# Patient Record
Sex: FEMALE | Race: WHITE | URBAN - METROPOLITAN AREA
[De-identification: names, ages, dates, MRNs, and addresses within clinical notes are randomized per-mention and may not be internally consistent; named-entity substitution may affect disease eponyms.]

---

## 2022-12-15 ENCOUNTER — APPOINTMENT (RX ONLY)
Dept: URBAN - METROPOLITAN AREA CLINIC 84 | Facility: CLINIC | Age: 67
Setting detail: DERMATOLOGY
End: 2022-12-15

## 2022-12-15 DIAGNOSIS — L40.0 PSORIASIS VULGARIS: ICD-10-CM

## 2022-12-15 PROCEDURE — ? PRESCRIPTION MEDICATION MANAGEMENT

## 2022-12-15 PROCEDURE — ? PRESCRIPTION

## 2022-12-15 PROCEDURE — 99214 OFFICE O/P EST MOD 30 MIN: CPT

## 2022-12-15 PROCEDURE — ? COUNSELING

## 2022-12-15 RX ORDER — CLOBETASOL PROPIONATE 0.5 MG/G
OINTMENT TOPICAL
Qty: 60 | Refills: 5 | Status: ERX | COMMUNITY
Start: 2022-12-15

## 2022-12-15 RX ADMIN — CLOBETASOL PROPIONATE: 0.5 OINTMENT TOPICAL at 00:00

## 2022-12-15 ASSESSMENT — LOCATION ZONE DERM
LOCATION ZONE: LEG
LOCATION ZONE: HAND
LOCATION ZONE: ARM

## 2022-12-15 ASSESSMENT — LOCATION SIMPLE DESCRIPTION DERM
LOCATION SIMPLE: LEFT POSTERIOR UPPER ARM
LOCATION SIMPLE: RIGHT FOREARM
LOCATION SIMPLE: LEFT HAND
LOCATION SIMPLE: LEFT KNEE
LOCATION SIMPLE: RIGHT HAND
LOCATION SIMPLE: RIGHT KNEE

## 2022-12-15 ASSESSMENT — LOCATION DETAILED DESCRIPTION DERM
LOCATION DETAILED: LEFT ULNAR DORSAL HAND
LOCATION DETAILED: RIGHT ULNAR DORSAL HAND
LOCATION DETAILED: LEFT DISTAL POSTERIOR UPPER ARM
LOCATION DETAILED: RIGHT PROXIMAL DORSAL FOREARM
LOCATION DETAILED: RIGHT KNEE
LOCATION DETAILED: LEFT KNEE

## 2022-12-15 NOTE — PROCEDURE: PRESCRIPTION MEDICATION MANAGEMENT
Initiate Treatment: Tremfya injection, new insurance cards obtained today. Sending in updated info to [facility].
Detail Level: Zone
Render In Strict Bullet Format?: No

## 2023-10-11 ENCOUNTER — PREP FOR PROCEDURE (OUTPATIENT)
Dept: SURGERY | Facility: HOSPITAL | Age: 68
End: 2023-10-11

## 2023-10-11 ENCOUNTER — APPOINTMENT (OUTPATIENT)
Dept: SURGERY | Facility: CLINIC | Age: 68
End: 2023-10-11

## 2023-10-11 ENCOUNTER — LAB (OUTPATIENT)
Dept: LAB | Facility: LAB | Age: 68
End: 2023-10-11
Payer: COMMERCIAL

## 2023-10-11 ENCOUNTER — OFFICE VISIT (OUTPATIENT)
Dept: SURGERY | Facility: CLINIC | Age: 68
End: 2023-10-11
Payer: COMMERCIAL

## 2023-10-11 VITALS — SYSTOLIC BLOOD PRESSURE: 124 MMHG | DIASTOLIC BLOOD PRESSURE: 80 MMHG | WEIGHT: 193 LBS

## 2023-10-11 DIAGNOSIS — C18.7 CANCER OF SIGMOID COLON (MULTI): Primary | ICD-10-CM

## 2023-10-11 DIAGNOSIS — C18.7 CANCER OF SIGMOID COLON (MULTI): ICD-10-CM

## 2023-10-11 PROCEDURE — 82378 CARCINOEMBRYONIC ANTIGEN: CPT | Performed by: SURGERY

## 2023-10-11 PROCEDURE — 99203 OFFICE O/P NEW LOW 30 MIN: CPT | Performed by: SURGERY

## 2023-10-11 PROCEDURE — 1159F MED LIST DOCD IN RCRD: CPT | Performed by: SURGERY

## 2023-10-11 RX ORDER — CLOPIDOGREL BISULFATE 75 MG/1
75 TABLET ORAL DAILY
COMMUNITY
End: 2024-02-13 | Stop reason: ALTCHOICE

## 2023-10-11 RX ORDER — METRONIDAZOLE 500 MG/100ML
500 INJECTION, SOLUTION INTRAVENOUS ONCE
Status: CANCELLED | OUTPATIENT
Start: 2023-10-11 | End: 2023-10-11

## 2023-10-11 RX ORDER — METRONIDAZOLE 500 MG/1
500 TABLET ORAL SEE ADMIN INSTRUCTIONS
Qty: 3 TABLET | Refills: 0 | Status: SHIPPED | OUTPATIENT
Start: 2023-10-11 | End: 2023-10-28 | Stop reason: HOSPADM

## 2023-10-11 RX ORDER — BUPROPION HYDROCHLORIDE 150 MG/1
150 TABLET ORAL DAILY
COMMUNITY
End: 2024-02-13 | Stop reason: WASHOUT

## 2023-10-11 RX ORDER — ACETAMINOPHEN 500 MG
1000 TABLET ORAL ONCE
Status: CANCELLED | OUTPATIENT
Start: 2023-10-11 | End: 2023-10-11

## 2023-10-11 RX ORDER — CLOBETASOL PROPIONATE 0.5 MG/G
1 CREAM TOPICAL 2 TIMES DAILY PRN
COMMUNITY
End: 2024-05-08 | Stop reason: ENTERED-IN-ERROR

## 2023-10-11 RX ORDER — METOPROLOL SUCCINATE 25 MG/1
25 TABLET, EXTENDED RELEASE ORAL DAILY
COMMUNITY
End: 2023-11-22 | Stop reason: SDUPTHER

## 2023-10-11 RX ORDER — SODIUM CHLORIDE 9 MG/ML
10 INJECTION, SOLUTION INTRAVENOUS CONTINUOUS
Status: CANCELLED | OUTPATIENT
Start: 2023-10-11

## 2023-10-11 RX ORDER — LISINOPRIL 10 MG/1
10 TABLET ORAL 2 TIMES DAILY
COMMUNITY
End: 2023-11-22 | Stop reason: SDUPTHER

## 2023-10-11 RX ORDER — ROSUVASTATIN CALCIUM 5 MG/1
5 TABLET, COATED ORAL DAILY
COMMUNITY
End: 2023-11-22 | Stop reason: SDUPTHER

## 2023-10-11 RX ORDER — ALVIMOPAN 12 MG/1
12 CAPSULE ORAL ONCE
Status: CANCELLED | OUTPATIENT
Start: 2023-10-11 | End: 2023-10-11

## 2023-10-11 RX ORDER — HEPARIN SODIUM 5000 [USP'U]/ML
5000 INJECTION, SOLUTION INTRAVENOUS; SUBCUTANEOUS ONCE
Status: CANCELLED | OUTPATIENT
Start: 2023-10-11 | End: 2023-10-11

## 2023-10-11 RX ORDER — NEOMYCIN SULFATE 500 MG/1
TABLET ORAL
Qty: 6 TABLET | Refills: 0 | Status: SHIPPED | OUTPATIENT
Start: 2023-10-11 | End: 2023-11-10

## 2023-10-11 ASSESSMENT — ENCOUNTER SYMPTOMS
ABDOMINAL PAIN: 1
CONSTIPATION: 1

## 2023-10-11 NOTE — H&P (VIEW-ONLY)
Subjective   Patient ID: Janis Ibanez is a 68 y.o. female who presents for Mass.  HPI  68-year-old female smoker history of anal cancer status post chemo and radiation 20 years ago self-referred for near obstructing sigmoid mass biopsy proven for adenocarcinoma in [place].  Patient has had a CT but images not imported she is having partial obstructive symptoms moderate abdominal discomfort but no severe pain  Review of Systems   Gastrointestinal:  Positive for abdominal pain and constipation.   All other systems reviewed and are negative.      Objective   Physical Exam  Abdominal:      Palpations: Abdomen is soft.      Comments: Mild abdominal distention no palpable mass       Physical Exam  Constitutional:       Appearance: Normal appearance.   HENT:      Head: Normocephalic and atraumatic.      Mouth/Throat:      Pharynx: Oropharynx is clear.   Eyes:      Pupils: Pupils are equal, round, and reactive to light.   Cardiovascular:      Rate and Rhythm: Normal rate and regular rhythm.   Pulmonary:      Effort: Pulmonary effort is normal.      Breath sounds: Normal breath sounds.   Abdominal:      General: Abdomen is flat. Bowel sounds are normal.      Palpations: Abdomen is soft.   Musculoskeletal:         General: Normal range of motion.      Cervical back: Normal range of motion.   Skin:     General: Skin is warm.   Neurological:      General: No focal deficit present.      Mental Status: She is alert. Mental status is at baseline.   Psychiatric:         Mood and Affect: Mood normal.    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 D. Amico for ureteral stents, needs cardiac clearance before surgery needs to stop Plavix 7 days before

## 2023-10-12 LAB — CEA SERPL-MCNC: 20.7 UG/L

## 2023-10-14 RX ORDER — HYDROXYZINE HYDROCHLORIDE 25 MG/1
25 TABLET, FILM COATED ORAL NIGHTLY PRN
COMMUNITY
Start: 2023-09-17 | End: 2024-02-13 | Stop reason: WASHOUT

## 2023-10-17 ENCOUNTER — OFFICE VISIT (OUTPATIENT)
Dept: CARDIOLOGY | Facility: CLINIC | Age: 68
End: 2023-10-17
Payer: COMMERCIAL

## 2023-10-17 VITALS
DIASTOLIC BLOOD PRESSURE: 72 MMHG | SYSTOLIC BLOOD PRESSURE: 126 MMHG | WEIGHT: 193.7 LBS | BODY MASS INDEX: 32.27 KG/M2 | HEART RATE: 51 BPM | HEIGHT: 65 IN

## 2023-10-17 DIAGNOSIS — F17.200 CURRENT EVERY DAY SMOKER: ICD-10-CM

## 2023-10-17 DIAGNOSIS — I25.2 H/O ACUTE MYOCARDIAL INFARCTION: ICD-10-CM

## 2023-10-17 DIAGNOSIS — I25.10 CAD S/P PERCUTANEOUS CORONARY ANGIOPLASTY: ICD-10-CM

## 2023-10-17 DIAGNOSIS — E78.2 MIXED HYPERLIPIDEMIA: ICD-10-CM

## 2023-10-17 DIAGNOSIS — R94.31 ABNORMAL EKG: ICD-10-CM

## 2023-10-17 DIAGNOSIS — C18.7 CANCER OF SIGMOID COLON (MULTI): ICD-10-CM

## 2023-10-17 DIAGNOSIS — Z01.810 PREOP CARDIOVASCULAR EXAM: ICD-10-CM

## 2023-10-17 DIAGNOSIS — Z98.61 CAD S/P PERCUTANEOUS CORONARY ANGIOPLASTY: ICD-10-CM

## 2023-10-17 PROCEDURE — 99204 OFFICE O/P NEW MOD 45 MIN: CPT | Performed by: INTERNAL MEDICINE

## 2023-10-17 PROCEDURE — 93000 ELECTROCARDIOGRAM COMPLETE: CPT | Performed by: INTERNAL MEDICINE

## 2023-10-17 PROCEDURE — 1159F MED LIST DOCD IN RCRD: CPT | Performed by: INTERNAL MEDICINE

## 2023-10-17 PROCEDURE — 3008F BODY MASS INDEX DOCD: CPT | Performed by: INTERNAL MEDICINE

## 2023-10-17 NOTE — PROGRESS NOTES
Referred by Dr. Olivares, Pedro GUTIERREZ MD provider found for   Chief Complaint   Patient presents with    New Patient Visit     She is here for POS for colon resection with Dr. Olivares        History of Present Illness  Janis Ibanez is a 68 y.o. year old female patient is here for preop clearance for right colon resection for colon cancer.  She is a pleasant female who had remote history of myocardial infarction remote history of coronary stenting about 10 years ago in [place].  Apparently she had myocardial infarction but she is not sure how much damage she had.  She has been cared for all the time in [place].  Recently was diagnosed with colon cancer in the cecum appeared for the surgery and further management.  The patient stated that she had not had any testing done for the heart patient's been chronically on Plavix therapy.  She is smoker of hypertension no history of diabetes.  Patient stated that she has a good functional capacity she has been walking but she does have some degenerative joint disease.  I discussed with the patient great length that we need to evaluate her cardiac status.  Will obtain echo to see whether she had a previous low ejection fraction prior to surgery.  Also obtain Lexiscan stress to the patient is not able to run on treadmill.  This will be rule out ischemia specifically in view of previous history of myocardial infarction and previous history of coronary stenting.  If this is negative then she will be cleared for surgery she can stop Plavix 7 days prior to operation.  The fact that she had stent a long time ago she may not need Plavix in the long-term.  Strongly advised to stop smoking.  Based on the results of the testing further recommendation will be made    Past Medical History  Past Medical History:   Diagnosis Date    History of heart attack        Social History  Social History     Tobacco Use    Smoking status: Every Day     Packs/day: .25     Types: Cigarettes    Smokeless  tobacco: Never   Substance Use Topics    Alcohol use: Not Currently     Comment: rare    Drug use: Never       Family History     Family History   Problem Relation Name Age of Onset    Hypertension Mother      Heart disease Mother      Other (cardiac stents) Mother      Hypertension Father      Heart attack Father      Prostate cancer Father         Review of Systems  As per HPI, all other systems reviewed and negative.    Allergies:  No Known Allergies     Outpatient Medications:  Current Outpatient Medications   Medication Instructions    buPROPion XL (WELLBUTRIN XL) 150 mg, oral, Daily, Do not crush, chew, or split.    clobetasol (Temovate) 0.05 % cream Topical, 2 times daily    clopidogrel (PLAVIX) 75 mg, oral, Daily    hydrOXYzine HCL (ATARAX) 25 mg, oral, Nightly    lisinopril 10 mg, oral, Daily    metoprolol succinate XL (TOPROL-XL) 25 mg, oral, Daily, Do not crush or chew.    metroNIDAZOLE (FLAGYL) 500 mg, oral, See admin instructions, Take 1 (one) tablet (500mg) at 6 PM, 7 PM, and 11 PM the night before surgery.    neomycin (Mycifradin) 500 mg tablet Take two tablets (1000 mg) by mouth at 3:00pm, 4:00pm and at bed time on the day prior to surgery    rosuvastatin (CRESTOR) 5 mg, oral, Daily         Vitals:  Vitals:    10/17/23 0827   BP: 126/72   Pulse: 51       Physical Exam:  Physical Exam  Vitals and nursing note reviewed.   Constitutional:       Appearance: Normal appearance. She is normal weight.   HENT:      Head: Normocephalic and atraumatic.   Eyes:      Extraocular Movements: Extraocular movements intact.      Pupils: Pupils are equal, round, and reactive to light.   Cardiovascular:      Rate and Rhythm: Normal rate and regular rhythm.      Pulses: Normal pulses.   Pulmonary:      Effort: Pulmonary effort is normal.      Breath sounds: Normal breath sounds.   Musculoskeletal:      Cervical back: Normal range of motion.      Right lower leg: No edema.      Left lower leg: No edema.   Skin:      General: Skin is warm and dry.   Neurological:      General: No focal deficit present.      Mental Status: She is alert and oriented to person, place, and time.             Assessment/Plan   Diagnoses and all orders for this visit:  Cancer of sigmoid colon (CMS/HCC)  -     Referral to Cardiology  Preop cardiovascular exam  CAD S/P percutaneous coronary angioplasty  H/O acute myocardial infarction  Mixed hyperlipidemia  BMI 32.0-32.9,adult  Current every day smoker      Kolton Lam MD [facility]  Interventional Cardiology   of [facility]     Thank you for allowing me to participate in the care of this patient. Please do not hesitate to contact me with any further questions or concerns.

## 2023-10-17 NOTE — PATIENT INSTRUCTIONS
Patient to follow up in 3 months with Dr. Genaro MD     Plan to complete Nuclear Lexiscan Stress Test and Echo done PREOP.   Office will hold clearance form until results reviewed and acceptable. Will call you with results and clearance.     Encouraged to quit smoking- heart healthy education given today.     No other changes today.   Continue same medications    Rajan BARRETT RN am scribing for and in the presence of Dr. Genaro MD

## 2023-10-19 ENCOUNTER — TELEPHONE (OUTPATIENT)
Dept: CARDIOLOGY | Facility: CLINIC | Age: 68
End: 2023-10-19
Payer: COMMERCIAL

## 2023-10-19 NOTE — TELEPHONE ENCOUNTER
Patient seen recently with Dr. Genaro MD for preop clearance pending Laparoscopic Sigmoid Resection possible open with Dr. Jolanta MD on 10/27/23.   Preop Nuclear and Echo ordered.   Scheduled tentatively for 10/20 and 10/24.     Form held, and placed in this writers purple clearance folder until results reviewed and clearance granted.   Postponing this note until 10/24 again.

## 2023-10-20 ENCOUNTER — HOSPITAL ENCOUNTER (OUTPATIENT)
Dept: CARDIOLOGY | Facility: CLINIC | Age: 68
Discharge: HOME | End: 2023-10-20
Payer: COMMERCIAL

## 2023-10-20 ENCOUNTER — ANCILLARY PROCEDURE (OUTPATIENT)
Dept: RADIOLOGY | Facility: CLINIC | Age: 68
End: 2023-10-20
Payer: COMMERCIAL

## 2023-10-20 DIAGNOSIS — I25.10 CAD S/P PERCUTANEOUS CORONARY ANGIOPLASTY: ICD-10-CM

## 2023-10-20 DIAGNOSIS — Z01.810 PREOP CARDIOVASCULAR EXAM: Primary | ICD-10-CM

## 2023-10-20 DIAGNOSIS — R94.31 ABNORMAL EKG: ICD-10-CM

## 2023-10-20 DIAGNOSIS — Z01.810 PREOP CARDIOVASCULAR EXAM: ICD-10-CM

## 2023-10-20 DIAGNOSIS — Z98.61 CAD S/P PERCUTANEOUS CORONARY ANGIOPLASTY: ICD-10-CM

## 2023-10-20 PROCEDURE — 93017 CV STRESS TEST TRACING ONLY: CPT

## 2023-10-20 PROCEDURE — 3430000001 HC RX 343 DIAGNOSTIC RADIOPHARMACEUTICALS: Performed by: INTERNAL MEDICINE

## 2023-10-20 PROCEDURE — 78452 HT MUSCLE IMAGE SPECT MULT: CPT | Performed by: INTERNAL MEDICINE

## 2023-10-20 PROCEDURE — 93016 CV STRESS TEST SUPVJ ONLY: CPT | Performed by: INTERNAL MEDICINE

## 2023-10-20 PROCEDURE — 93018 CV STRESS TEST I&R ONLY: CPT | Performed by: INTERNAL MEDICINE

## 2023-10-20 PROCEDURE — 78452 HT MUSCLE IMAGE SPECT MULT: CPT

## 2023-10-20 PROCEDURE — A9502 TC99M TETROFOSMIN: HCPCS | Performed by: INTERNAL MEDICINE

## 2023-10-20 PROCEDURE — 2500000004 HC RX 250 GENERAL PHARMACY W/ HCPCS (ALT 636 FOR OP/ED): Performed by: INTERNAL MEDICINE

## 2023-10-20 RX ORDER — REGADENOSON 0.08 MG/ML
0.4 INJECTION, SOLUTION INTRAVENOUS ONCE
Status: COMPLETED | OUTPATIENT
Start: 2023-10-20 | End: 2023-10-20

## 2023-10-20 RX ADMIN — REGADENOSON 0.4 MG: 0.08 INJECTION, SOLUTION INTRAVENOUS at 09:17

## 2023-10-20 RX ADMIN — TETROFOSMIN 11.8 MILLICURIE: 0.23 INJECTION, POWDER, LYOPHILIZED, FOR SOLUTION INTRAVENOUS at 07:56

## 2023-10-20 RX ADMIN — TETROFOSMIN 35.7 MILLICURIE: 0.23 INJECTION, POWDER, LYOPHILIZED, FOR SOLUTION INTRAVENOUS at 09:17

## 2023-10-24 ENCOUNTER — TELEPHONE (OUTPATIENT)
Dept: CARDIOLOGY | Facility: CLINIC | Age: 68
End: 2023-10-24
Payer: COMMERCIAL

## 2023-10-24 ENCOUNTER — HOSPITAL ENCOUNTER (OUTPATIENT)
Dept: CARDIOLOGY | Facility: HOSPITAL | Age: 68
Discharge: HOME | DRG: 989 | End: 2023-10-24
Payer: COMMERCIAL

## 2023-10-24 DIAGNOSIS — Z98.61 CAD S/P PERCUTANEOUS CORONARY ANGIOPLASTY: ICD-10-CM

## 2023-10-24 DIAGNOSIS — I25.10 CAD S/P PERCUTANEOUS CORONARY ANGIOPLASTY: ICD-10-CM

## 2023-10-24 DIAGNOSIS — Z01.810 PREOP CARDIOVASCULAR EXAM: ICD-10-CM

## 2023-10-24 DIAGNOSIS — R94.31 ABNORMAL EKG: ICD-10-CM

## 2023-10-24 LAB — EJECTION FRACTION APICAL 4 CHAMBER: 57.1

## 2023-10-24 PROCEDURE — 93306 TTE W/DOPPLER COMPLETE: CPT | Performed by: INTERNAL MEDICINE

## 2023-10-24 PROCEDURE — 93306 TTE W/DOPPLER COMPLETE: CPT

## 2023-10-24 NOTE — TELEPHONE ENCOUNTER
Noted below- will wait for Echo to be completed today and resulted. Will address clearance at that time.

## 2023-10-24 NOTE — TELEPHONE ENCOUNTER
Echo resulted today:  CONCLUSIONS:   1. Left ventricular systolic function is normal with a 55-60% estimated ejection fraction.   2. Spectral Doppler shows an impaired relaxation pattern of left ventricular diastolic filling.   3. Aortic valve sclerosis.   4. Mild aortic valve regurgitation.    Placed form for Dr. Genaro MD review today in office. Asking to hold Plavix x7 days preop. Both Echo and Nuclear are normal.

## 2023-10-24 NOTE — TELEPHONE ENCOUNTER
Katerin called office stating that pt is scheduled for Lap. Sigmoid Colon Resection on Friday 10/27/2023.  They need to know if pt can hold plavix.    Katerin states that they faxed clearance form to office on 10/11/2023.      Please review with Dr. Lam while in office and follow up with Katerin PORTER.

## 2023-10-24 NOTE — TELEPHONE ENCOUNTER
There is already a note started regarding this clearance. Patient was scheduled for preop Echo and Nuclear. Of which is being completed today. I had postponed my previous note to follow up on results and address clearance today. Will close this note and follow up in previous one. Thank you.

## 2023-10-25 NOTE — TELEPHONE ENCOUNTER
Per Dr. Genaro MD- patient Echo and Nuclear both acceptable. Patient cleared for procedure. OK to hold Plavix x5 days preop. Form completed and faxed with confirmation received.   Patient advised with verbal understanding, she has been holding Plavix currently.   Placed to scan.

## 2023-10-26 ENCOUNTER — PREP FOR PROCEDURE (OUTPATIENT)
Dept: SURGERY | Facility: HOSPITAL | Age: 68
End: 2023-10-26
Payer: COMMERCIAL

## 2023-10-26 DIAGNOSIS — Z12.11 COLON CANCER SCREENING: Primary | ICD-10-CM

## 2023-10-27 ENCOUNTER — HOSPITAL ENCOUNTER (INPATIENT)
Facility: HOSPITAL | Age: 68
LOS: 1 days | Discharge: HOME | DRG: 989 | End: 2023-10-28
Attending: SURGERY | Admitting: SURGERY
Payer: COMMERCIAL

## 2023-10-27 ENCOUNTER — ANESTHESIA (OUTPATIENT)
Dept: OPERATING ROOM | Facility: HOSPITAL | Age: 68
DRG: 989 | End: 2023-10-27
Payer: COMMERCIAL

## 2023-10-27 ENCOUNTER — ANESTHESIA EVENT (OUTPATIENT)
Dept: OPERATING ROOM | Facility: HOSPITAL | Age: 68
DRG: 989 | End: 2023-10-27
Payer: COMMERCIAL

## 2023-10-27 DIAGNOSIS — C18.7 CANCER OF SIGMOID COLON (MULTI): Primary | ICD-10-CM

## 2023-10-27 PROBLEM — I21.9 MI (MYOCARDIAL INFARCTION) (MULTI): Status: ACTIVE | Noted: 2023-10-27

## 2023-10-27 PROCEDURE — 2720000007 HC OR 272 NO HCPCS: Performed by: SURGERY

## 2023-10-27 PROCEDURE — 0T9B70Z DRAINAGE OF BLADDER WITH DRAINAGE DEVICE, VIA NATURAL OR ARTIFICIAL OPENING: ICD-10-PCS | Performed by: SURGERY

## 2023-10-27 PROCEDURE — 96372 THER/PROPH/DIAG INJ SC/IM: CPT | Performed by: SURGERY

## 2023-10-27 PROCEDURE — 0T788DZ DILATION OF BILATERAL URETERS WITH INTRALUMINAL DEVICE, VIA NATURAL OR ARTIFICIAL OPENING ENDOSCOPIC: ICD-10-PCS | Performed by: SURGERY

## 2023-10-27 PROCEDURE — 2500000004 HC RX 250 GENERAL PHARMACY W/ HCPCS (ALT 636 FOR OP/ED): Performed by: ANESTHESIOLOGY

## 2023-10-27 PROCEDURE — C1769 GUIDE WIRE: HCPCS | Performed by: SURGERY

## 2023-10-27 PROCEDURE — 2500000005 HC RX 250 GENERAL PHARMACY W/O HCPCS: Performed by: ANESTHESIOLOGY

## 2023-10-27 PROCEDURE — 7100000001 HC RECOVERY ROOM TIME - INITIAL BASE CHARGE: Performed by: SURGERY

## 2023-10-27 PROCEDURE — 1100000001 HC PRIVATE ROOM DAILY

## 2023-10-27 PROCEDURE — A4217 STERILE WATER/SALINE, 500 ML: HCPCS | Performed by: SURGERY

## 2023-10-27 PROCEDURE — 3600000009 HC OR TIME - EACH INCREMENTAL 1 MINUTE - PROCEDURE LEVEL FOUR: Performed by: SURGERY

## 2023-10-27 PROCEDURE — 3700000002 HC GENERAL ANESTHESIA TIME - EACH INCREMENTAL 1 MINUTE: Performed by: SURGERY

## 2023-10-27 PROCEDURE — 2500000004 HC RX 250 GENERAL PHARMACY W/ HCPCS (ALT 636 FOR OP/ED): Performed by: SURGERY

## 2023-10-27 PROCEDURE — 7100000002 HC RECOVERY ROOM TIME - EACH INCREMENTAL 1 MINUTE: Performed by: SURGERY

## 2023-10-27 PROCEDURE — 3700000001 HC GENERAL ANESTHESIA TIME - INITIAL BASE CHARGE: Performed by: SURGERY

## 2023-10-27 PROCEDURE — 2500000001 HC RX 250 WO HCPCS SELF ADMINISTERED DRUGS (ALT 637 FOR MEDICARE OP): Performed by: SURGERY

## 2023-10-27 PROCEDURE — 2500000005 HC RX 250 GENERAL PHARMACY W/O HCPCS: Performed by: SURGERY

## 2023-10-27 PROCEDURE — 3600000004 HC OR TIME - INITIAL BASE CHARGE - PROCEDURE LEVEL FOUR: Performed by: SURGERY

## 2023-10-27 PROCEDURE — 58660 LAPAROSCOPY LYSIS: CPT | Performed by: SURGERY

## 2023-10-27 RX ORDER — MORPHINE SULFATE 4 MG/ML
4 INJECTION, SOLUTION INTRAMUSCULAR; INTRAVENOUS EVERY 4 HOURS PRN
Status: DISCONTINUED | OUTPATIENT
Start: 2023-10-27 | End: 2023-10-28 | Stop reason: HOSPADM

## 2023-10-27 RX ORDER — METRONIDAZOLE 500 MG/100ML
500 INJECTION, SOLUTION INTRAVENOUS ONCE
Status: COMPLETED | OUTPATIENT
Start: 2023-10-27 | End: 2023-10-27

## 2023-10-27 RX ORDER — PROPOFOL 10 MG/ML
INJECTION, EMULSION INTRAVENOUS AS NEEDED
Status: DISCONTINUED | OUTPATIENT
Start: 2023-10-27 | End: 2023-10-27

## 2023-10-27 RX ORDER — MIDAZOLAM HYDROCHLORIDE 1 MG/ML
1 INJECTION, SOLUTION INTRAMUSCULAR; INTRAVENOUS ONCE AS NEEDED
Status: DISCONTINUED | OUTPATIENT
Start: 2023-10-27 | End: 2023-10-27 | Stop reason: HOSPADM

## 2023-10-27 RX ORDER — FENTANYL CITRATE 50 UG/ML
INJECTION, SOLUTION INTRAMUSCULAR; INTRAVENOUS AS NEEDED
Status: DISCONTINUED | OUTPATIENT
Start: 2023-10-27 | End: 2023-10-27

## 2023-10-27 RX ORDER — DROPERIDOL 2.5 MG/ML
0.62 INJECTION, SOLUTION INTRAMUSCULAR; INTRAVENOUS ONCE AS NEEDED
Status: CANCELLED | OUTPATIENT
Start: 2023-10-27

## 2023-10-27 RX ORDER — GLYCOPYRROLATE 0.2 MG/ML
INJECTION INTRAMUSCULAR; INTRAVENOUS AS NEEDED
Status: DISCONTINUED | OUTPATIENT
Start: 2023-10-27 | End: 2023-10-27

## 2023-10-27 RX ORDER — SODIUM CHLORIDE 9 MG/ML
10 INJECTION, SOLUTION INTRAVENOUS CONTINUOUS
Status: DISCONTINUED | OUTPATIENT
Start: 2023-10-27 | End: 2023-10-27

## 2023-10-27 RX ORDER — OXYCODONE HYDROCHLORIDE 5 MG/1
5 TABLET ORAL EVERY 4 HOURS PRN
Status: DISCONTINUED | OUTPATIENT
Start: 2023-10-27 | End: 2023-10-28 | Stop reason: HOSPADM

## 2023-10-27 RX ORDER — SODIUM CHLORIDE 9 MG/ML
100 INJECTION, SOLUTION INTRAVENOUS CONTINUOUS
Status: DISCONTINUED | OUTPATIENT
Start: 2023-10-27 | End: 2023-10-28 | Stop reason: HOSPADM

## 2023-10-27 RX ORDER — SODIUM CHLORIDE 0.9 G/100ML
IRRIGANT IRRIGATION AS NEEDED
Status: DISCONTINUED | OUTPATIENT
Start: 2023-10-27 | End: 2023-10-27 | Stop reason: HOSPADM

## 2023-10-27 RX ORDER — KETOROLAC TROMETHAMINE 30 MG/ML
15 INJECTION, SOLUTION INTRAMUSCULAR; INTRAVENOUS EVERY 6 HOURS PRN
Status: DISCONTINUED | OUTPATIENT
Start: 2023-10-27 | End: 2023-10-28 | Stop reason: HOSPADM

## 2023-10-27 RX ORDER — CEFAZOLIN SODIUM 2 G/100ML
2 INJECTION, SOLUTION INTRAVENOUS ONCE
Status: COMPLETED | OUTPATIENT
Start: 2023-10-27 | End: 2023-10-27

## 2023-10-27 RX ORDER — DROPERIDOL 2.5 MG/ML
0.62 INJECTION, SOLUTION INTRAMUSCULAR; INTRAVENOUS ONCE AS NEEDED
Status: DISCONTINUED | OUTPATIENT
Start: 2023-10-27 | End: 2023-10-27 | Stop reason: HOSPADM

## 2023-10-27 RX ORDER — MIDAZOLAM HYDROCHLORIDE 1 MG/ML
INJECTION, SOLUTION INTRAMUSCULAR; INTRAVENOUS AS NEEDED
Status: DISCONTINUED | OUTPATIENT
Start: 2023-10-27 | End: 2023-10-27

## 2023-10-27 RX ORDER — POLYETHYLENE GLYCOL 3350 17 G/17G
17 POWDER, FOR SOLUTION ORAL DAILY
Status: DISCONTINUED | OUTPATIENT
Start: 2023-10-27 | End: 2023-10-28 | Stop reason: HOSPADM

## 2023-10-27 RX ORDER — LABETALOL HYDROCHLORIDE 5 MG/ML
5 INJECTION, SOLUTION INTRAVENOUS ONCE AS NEEDED
Status: CANCELLED | OUTPATIENT
Start: 2023-10-27

## 2023-10-27 RX ORDER — LIDOCAINE HYDROCHLORIDE 10 MG/ML
0.1 INJECTION, SOLUTION EPIDURAL; INFILTRATION; INTRACAUDAL; PERINEURAL ONCE
Status: CANCELLED | OUTPATIENT
Start: 2023-10-27 | End: 2023-10-27

## 2023-10-27 RX ORDER — ENOXAPARIN SODIUM 100 MG/ML
40 INJECTION SUBCUTANEOUS EVERY 24 HOURS
Status: DISCONTINUED | OUTPATIENT
Start: 2023-10-27 | End: 2023-10-28 | Stop reason: HOSPADM

## 2023-10-27 RX ORDER — ACETAMINOPHEN 325 MG/1
650 TABLET ORAL EVERY 4 HOURS PRN
Status: DISCONTINUED | OUTPATIENT
Start: 2023-10-27 | End: 2023-10-28 | Stop reason: HOSPADM

## 2023-10-27 RX ORDER — SODIUM CHLORIDE, SODIUM LACTATE, POTASSIUM CHLORIDE, CALCIUM CHLORIDE 600; 310; 30; 20 MG/100ML; MG/100ML; MG/100ML; MG/100ML
100 INJECTION, SOLUTION INTRAVENOUS CONTINUOUS
Status: CANCELLED | OUTPATIENT
Start: 2023-10-27

## 2023-10-27 RX ORDER — SODIUM CHLORIDE, SODIUM LACTATE, POTASSIUM CHLORIDE, CALCIUM CHLORIDE 600; 310; 30; 20 MG/100ML; MG/100ML; MG/100ML; MG/100ML
100 INJECTION, SOLUTION INTRAVENOUS CONTINUOUS
Status: DISCONTINUED | OUTPATIENT
Start: 2023-10-27 | End: 2023-10-27 | Stop reason: HOSPADM

## 2023-10-27 RX ORDER — ALBUTEROL SULFATE 0.83 MG/ML
2.5 SOLUTION RESPIRATORY (INHALATION) ONCE
Status: DISCONTINUED | OUTPATIENT
Start: 2023-10-27 | End: 2023-10-27 | Stop reason: HOSPADM

## 2023-10-27 RX ORDER — MEPERIDINE HYDROCHLORIDE 25 MG/ML
12.5 INJECTION INTRAMUSCULAR; INTRAVENOUS; SUBCUTANEOUS EVERY 10 MIN PRN
Status: DISCONTINUED | OUTPATIENT
Start: 2023-10-27 | End: 2023-10-27 | Stop reason: HOSPADM

## 2023-10-27 RX ORDER — OXYCODONE HYDROCHLORIDE 5 MG/1
5 TABLET ORAL EVERY 4 HOURS PRN
Status: CANCELLED | OUTPATIENT
Start: 2023-10-27

## 2023-10-27 RX ORDER — ALBUTEROL SULFATE 0.83 MG/ML
2.5 SOLUTION RESPIRATORY (INHALATION) ONCE
Status: CANCELLED | OUTPATIENT
Start: 2023-10-27 | End: 2023-10-27

## 2023-10-27 RX ORDER — BUPIVACAINE HCL/EPINEPHRINE 0.25-.0005
VIAL (ML) INJECTION AS NEEDED
Status: DISCONTINUED | OUTPATIENT
Start: 2023-10-27 | End: 2023-10-27 | Stop reason: HOSPADM

## 2023-10-27 RX ORDER — HEPARIN SODIUM 5000 [USP'U]/ML
5000 INJECTION, SOLUTION INTRAVENOUS; SUBCUTANEOUS ONCE
Status: COMPLETED | OUTPATIENT
Start: 2023-10-27 | End: 2023-10-27

## 2023-10-27 RX ORDER — ACETAMINOPHEN 325 MG/1
1000 TABLET ORAL ONCE
Status: COMPLETED | OUTPATIENT
Start: 2023-10-27 | End: 2023-10-27

## 2023-10-27 RX ORDER — ALVIMOPAN 12 MG/1
12 CAPSULE ORAL ONCE
Status: COMPLETED | OUTPATIENT
Start: 2023-10-27 | End: 2023-10-27

## 2023-10-27 RX ORDER — ROCURONIUM BROMIDE 10 MG/ML
INJECTION, SOLUTION INTRAVENOUS AS NEEDED
Status: DISCONTINUED | OUTPATIENT
Start: 2023-10-27 | End: 2023-10-27

## 2023-10-27 RX ORDER — LIDOCAINE HYDROCHLORIDE 10 MG/ML
0.1 INJECTION, SOLUTION EPIDURAL; INFILTRATION; INTRACAUDAL; PERINEURAL ONCE
Status: DISCONTINUED | OUTPATIENT
Start: 2023-10-27 | End: 2023-10-27 | Stop reason: HOSPADM

## 2023-10-27 RX ORDER — ONDANSETRON HYDROCHLORIDE 2 MG/ML
INJECTION, SOLUTION INTRAVENOUS AS NEEDED
Status: DISCONTINUED | OUTPATIENT
Start: 2023-10-27 | End: 2023-10-27

## 2023-10-27 RX ORDER — KETOROLAC TROMETHAMINE 30 MG/ML
INJECTION, SOLUTION INTRAMUSCULAR; INTRAVENOUS AS NEEDED
Status: DISCONTINUED | OUTPATIENT
Start: 2023-10-27 | End: 2023-10-27

## 2023-10-27 RX ORDER — LABETALOL HYDROCHLORIDE 5 MG/ML
5 INJECTION, SOLUTION INTRAVENOUS ONCE AS NEEDED
Status: DISCONTINUED | OUTPATIENT
Start: 2023-10-27 | End: 2023-10-27 | Stop reason: HOSPADM

## 2023-10-27 RX ORDER — MEPERIDINE HYDROCHLORIDE 25 MG/ML
12.5 INJECTION INTRAMUSCULAR; INTRAVENOUS; SUBCUTANEOUS EVERY 10 MIN PRN
Status: CANCELLED | OUTPATIENT
Start: 2023-10-27

## 2023-10-27 RX ORDER — MIDAZOLAM HYDROCHLORIDE 1 MG/ML
1 INJECTION, SOLUTION INTRAMUSCULAR; INTRAVENOUS ONCE AS NEEDED
Status: CANCELLED | OUTPATIENT
Start: 2023-10-27

## 2023-10-27 RX ORDER — ROPIVACAINE HYDROCHLORIDE 5 MG/ML
40 INJECTION, SOLUTION EPIDURAL; INFILTRATION; PERINEURAL ONCE
Status: DISCONTINUED | OUTPATIENT
Start: 2023-10-27 | End: 2023-10-27

## 2023-10-27 RX ADMIN — ROCURONIUM BROMIDE 50 MG: 10 SOLUTION INTRAVENOUS at 13:00

## 2023-10-27 RX ADMIN — GLYCOPYRROLATE 0.2 MG: 0.2 INJECTION, SOLUTION INTRAMUSCULAR; INTRAVENOUS at 14:26

## 2023-10-27 RX ADMIN — SODIUM CHLORIDE: 9 INJECTION, SOLUTION INTRAVENOUS at 14:33

## 2023-10-27 RX ADMIN — FENTANYL CITRATE 50 MCG: 50 INJECTION, SOLUTION INTRAMUSCULAR; INTRAVENOUS at 14:37

## 2023-10-27 RX ADMIN — ALVIMOPAN 12 MG: 12 CAPSULE ORAL at 11:42

## 2023-10-27 RX ADMIN — SUGAMMADEX 200 MG: 100 INJECTION, SOLUTION INTRAVENOUS at 16:18

## 2023-10-27 RX ADMIN — SODIUM CHLORIDE 100 ML/HR: 9 INJECTION, SOLUTION INTRAVENOUS at 19:13

## 2023-10-27 RX ADMIN — ACETAMINOPHEN 975 MG: 325 TABLET ORAL at 11:41

## 2023-10-27 RX ADMIN — ONDANSETRON 4 MG: 2 INJECTION INTRAMUSCULAR; INTRAVENOUS at 16:01

## 2023-10-27 RX ADMIN — KETOROLAC TROMETHAMINE 15 MG: 30 INJECTION, SOLUTION INTRAMUSCULAR; INTRAVENOUS at 19:15

## 2023-10-27 RX ADMIN — CEFAZOLIN SODIUM 2 G: 2 INJECTION, SOLUTION INTRAVENOUS at 13:15

## 2023-10-27 RX ADMIN — MIDAZOLAM 2 MG: 1 INJECTION INTRAMUSCULAR; INTRAVENOUS at 12:54

## 2023-10-27 RX ADMIN — KETOROLAC TROMETHAMINE 30 MG: 30 INJECTION, SOLUTION INTRAMUSCULAR at 16:01

## 2023-10-27 RX ADMIN — ROCURONIUM BROMIDE 20 MG: 10 SOLUTION INTRAVENOUS at 15:55

## 2023-10-27 RX ADMIN — METRONIDAZOLE 500 MG: 500 INJECTION, SOLUTION INTRAVENOUS at 11:42

## 2023-10-27 RX ADMIN — SODIUM CHLORIDE 10 ML/HR: 9 INJECTION, SOLUTION INTRAVENOUS at 11:41

## 2023-10-27 RX ADMIN — DEXAMETHASONE SODIUM PHOSPHATE 8 MG: 4 INJECTION, SOLUTION INTRAMUSCULAR; INTRAVENOUS at 16:01

## 2023-10-27 RX ADMIN — ROCURONIUM BROMIDE 30 MG: 10 SOLUTION INTRAVENOUS at 14:27

## 2023-10-27 RX ADMIN — ENOXAPARIN SODIUM 40 MG: 40 INJECTION SUBCUTANEOUS at 19:15

## 2023-10-27 RX ADMIN — FENTANYL CITRATE 50 MCG: 50 INJECTION, SOLUTION INTRAMUSCULAR; INTRAVENOUS at 14:46

## 2023-10-27 RX ADMIN — PROPOFOL 200 MG: 10 INJECTION, EMULSION INTRAVENOUS at 13:00

## 2023-10-27 RX ADMIN — FENTANYL CITRATE 100 MCG: 50 INJECTION, SOLUTION INTRAMUSCULAR; INTRAVENOUS at 12:54

## 2023-10-27 RX ADMIN — HEPARIN SODIUM 5000 UNITS: 5000 INJECTION INTRAVENOUS; SUBCUTANEOUS at 11:41

## 2023-10-27 SDOH — HEALTH STABILITY: MENTAL HEALTH: CURRENT SMOKER: 1

## 2023-10-27 ASSESSMENT — PAIN SCALES - GENERAL
PAINLEVEL_OUTOF10: 0 - NO PAIN
PAINLEVEL_OUTOF10: 1
PAINLEVEL_OUTOF10: 0 - NO PAIN

## 2023-10-27 ASSESSMENT — PAIN - FUNCTIONAL ASSESSMENT
PAIN_FUNCTIONAL_ASSESSMENT: 0-10

## 2023-10-27 ASSESSMENT — COLUMBIA-SUICIDE SEVERITY RATING SCALE - C-SSRS
2. HAVE YOU ACTUALLY HAD ANY THOUGHTS OF KILLING YOURSELF?: NO
6. HAVE YOU EVER DONE ANYTHING, STARTED TO DO ANYTHING, OR PREPARED TO DO ANYTHING TO END YOUR LIFE?: NO
1. IN THE PAST MONTH, HAVE YOU WISHED YOU WERE DEAD OR WISHED YOU COULD GO TO SLEEP AND NOT WAKE UP?: NO

## 2023-10-27 NOTE — PERIOPERATIVE NURSING NOTE
Ureteral stent placement with Dr. D'Amico [phone], 1st timeout 1319  Diagnostic laparoscopy with Dr Olivares [phone], 2nd timeout 1421

## 2023-10-27 NOTE — ANESTHESIA PREPROCEDURE EVALUATION
Patient: Marquita Ibanez    Procedure Information       Date/Time: 10/27/23 1245    Procedure: RESECTION LAPAROSCOPY SIGMOID COLON    Location: [facility] / [facility]    Surgeons: Pedro GUTIERREZ MD            Relevant Problems   Anesthesia (within normal limits)      Cardiovascular   (+) CAD S/P percutaneous coronary angioplasty   (+) MI (myocardial infarction) (CMS/HCC)   (+) Mixed hyperlipidemia      Endocrine (within normal limits)      GI   (+) Cancer of sigmoid colon (CMS/HCC)      /Renal (within normal limits)      Neuro/Psych (within normal limits)      Pulmonary (within normal limits)      GI/Hepatic   (+) Cancer of sigmoid colon (CMS/HCC)      Hematology (within normal limits)      Musculoskeletal (within normal limits)      Eyes, Ears, Nose, and Throat (within normal limits)      Infectious Disease (within normal limits)       Clinical information reviewed:   Tobacco  Allergies  Meds   Med Hx  Surg Hx  OB Status  Fam Hx  Soc   Hx        NPO Detail:  NPO/Void Status  Date of Last Liquid: 10/26/23  Time of Last Liquid: 2200  Date of Last Solid: 10/25/23  Time of Last Solid: 2000  Last Intake Type: Clear fluids         Physical Exam    Airway  Mallampati: II     Cardiovascular - normal exam  Rhythm: regular     Dental - normal exam     Pulmonary - normal exam     Abdominal - normal exam             Anesthesia Plan    ASA 3     general and regional     The patient is a current smoker.    intravenous induction   Anesthetic plan and risks discussed with patient.

## 2023-10-27 NOTE — OP NOTE
DIAGNOSTIC LAPAROSCOPY, LYSIS OF ADHESIONS, URETERAL STENT INSERTION- DR D'AMICO Operative Note     Date: 10/27/2023  OR Location: [facility]    Name: Marquita Ibanez, : 1955, Age: 68 y.o., MRN: [id number], Sex: female    Diagnosis  Pre-op Diagnosis     * Cancer of sigmoid colon (CMS/HCC) [C18.7] Upper rectal cancer     Procedures  Diagnostic laparoscopy laparoscopic lysis of adhesions intraoperative colonoscopy surgeons       Resident/Fellow/Other Assistant:  Mejia Parra    Procedure Summary  Anesthesia: General  ASA: III  Anesthesia Staff: Anesthesiologist: Tj Izaguirre MD  Estimated Blood Loss: Minimal mL  Intra-op Medications:   Medication Name Total Dose   sodium chloride 0.9% infusion 1,575 mL   ceFAZolin in dextrose (iso-os) (Ancef) IVPB 2 g 2 g              Anesthesia Record               Intraprocedure I/O Totals       None           Specimen: No specimens collected     Staff:   Circulator: Florencia Stephens RN  Scrub Person: Daniela Goldsmith         Drains and/or Catheters:   NG/OG/Feeding Tube (Active)       Tourniquet Times:         Implants:     Findings: Upper rectal mass, malignant    Indications: Marquita Ibanez is an 68 y.o. female who is having surgery for Cancer of sigmoid colon (CMS/HCC) [C18.7].     The patient was seen in the preoperative area. The risks, benefits, complications, treatment options, non-operative alternatives, expected recovery and outcomes were discussed with the patient. The possibilities of reaction to medication, pulmonary aspiration, injury to surrounding structures, bleeding, recurrent infection, the need for additional procedures, failure to diagnose a condition, and creating a complication requiring transfusion or operation were discussed with the patient. The patient concurred with the proposed plan, giving informed consent.  The site of surgery was properly noted/marked if necessary per policy. The patient has been actively warmed in preoperative area. Preoperative  antibiotics have been ordered and given within 1 hours of incision. Venous thrombosis prophylaxis have been ordered including bilateral sequential compression devices and chemical prophylaxis    Procedure Details: Patient was brought to the OR laid supine.  Preoperative huddle was performed and all team members participated.  Patient then placed under general anesthesia.  She was placed in lithotomy position and ureteral catheters were placed.  Rectal irrigation was performed.  Patient had previous radiation to the anus for anal CA.  Rectal effluent was clear.  Patient was then prepped and draped in standard surgical fashion.  Timeout procedure was observed elected proceed at this time.  Local anesthetic was instilled above the umbilicus supraumbilical incision made abdomen is entered in a safe fashion 12 mm balloon port was placed inflated and secured patient was placed in headdown position.  Patient had omental adhesions in the pelvis.  5 mm port was placed x3 in the right side and adhesions were taken down with careful application of cautery.  Patient was placed in a steep headdown left side up position and another 5 mm port was placed in left lower quadrant.  The colon was partially mobilized looking for the lesion proximally patient had significant amount of Yue ink in the retroperitoneum close to the bladder.  Patient had a firm adhesion in this area.  Plane was dissected lateral and gently underneath for approximately only 3 cm.  At this point intraoperative colonoscopy was performed which showed a large tumor circumferential at 8 to 9 cm.  Patient had bulky disease by examination and inspection.  It was felt that this patient would benefit from neoadjuvant treatment prior to TME and rectal resection.  I felt it was in the patient's best interest to have this done first.  The tumor was distal to what was recorded in the gastroenterology documentation.  Ports were then removed entry site fascia was closed  with 0 Vicryl figure-of-eight fashion.  Ureteral catheters and stents and Sin catheter removed patient tolerated procedure well discussed with the son in detail  Complications:  None; patient tolerated the procedure well.    Disposition: PACU - hemodynamically stable.  Condition: stable         Additional Details:     Attending Attestation:     Pedro Olivares V  Phone Number: [phone]

## 2023-10-27 NOTE — OP NOTE
PREOPERATIVE DIAGNOSIS: Tumor of the sigmoid colon    POSTOP DIAGNOSIS: Same as preop and severe midline cystocele with prolapse, urethral stenosis    OPERATIVE PROCEDURE:  CYSTOSCOPY WITH DILATION WITH SVETLANA SOUNDS, INSERTION OF BILATERAL LIGHTED BLINKING URETERAL STENTS OVER GLIDEWIRE, EXAM UNDER ANESTHESIA, INSERTION PALENCIA CATHETER    WITH the patient in the appropriate lithotomy position and under general anesthesia, the patient was prepped and draped per routine.  Immediately noted on exam is a moderately severe cystocele with prolapse just beyond the vaginal introitus and the urethra was angulated upwards and was very narrow and would not admit the 23 Australian scope easily  Therefore urethral dilation with Svetlana sounds was also carried out, urethra was calibrated and dilated to 2 6 Australian with sounds and the 23 Australian Olympus operating cystourethroscope unit utilized.  The cystoscope was inserted per urethra into the bladder.  The bladder was carefully inspected with both 30 degree and 70 degree lenses, no mucosal lesions were identified however there was a deep drop at the 6 o'clock position of the bladder neck consistent with severe midline cystocele with prolapse, requiring steep downward angulation of the scope in order to obtain good vision of the trigone area as well.  Both ureteral orifices were identified.   Utilizing gentle manipulation, the right ureteral orifice was identified and catheterized with a 0.038 floppy tip Glidewire.  Over the Glidewire, a 6 Australian clear demarcated ureteral stent was passed up the ureter and positioned appropriately in the upper ureter and renal pelvis.  The Glidewire was then removed from the inside of  the ureteral stent.  The thin lighted fiberoptic element was then passed within the lumen of the clear ureteral stent upward into the renal pelvis.  The adapter plugs were filled with bone wax to assist in ceiling and the winged adapter placed on the end of the stent  followed by a sealed rubber plug holding the fiberoptic lighted filament within and securing it to the end of the clear stent.   In similar fashion, the contralateral left ureteral orifice was identified, also catheterized with a 0.038 Glidewire and a 6 Luxembourgish clear ureteral stent inserted along with the lighted fiberoptic element and attached and secured appropriately.  A Sin catheter was inserted and the stents were then secured to the Sin catheter using plastic clips along with silk ligatures on the end of the catheter attached to the drainage bag.  The apparatus was then secured to the leg with a catheter secure.  No complications were encountered and the patient tolerated this part of the procedure well and the procedure was then turned over to general surgery for completion.  Follow-up will be on a when necessary basis.  Thank you for allowing us to participate in this patient's care and renotify us PRN.    LOUIS D'AMICO, MD  [facility]  UROLOGY  [facility]  BOARD CERTIFIED, [employer] BOARD OF UROLOGY

## 2023-10-27 NOTE — ANESTHESIA PROCEDURE NOTES
Airway  Date/Time: 10/27/2023 1:08 PM  Urgency: elective    Airway not difficult    Staffing  Performed: attending   Authorized by: Tj Izaguirre MD    Performed by: Tj Izaguirre MD  Patient location during procedure: OR    Indications and Patient Condition  Indications for airway management: anesthesia and airway protection  Spontaneous ventilation: present  Sedation level: deep  Preoxygenated: yes  Patient position: sniffing  Mask difficulty assessment: 0 - not attempted  Planned trial extubation    Final Airway Details  Final airway type: endotracheal airway      Successful airway: ETT  Cuffed: yes   Successful intubation technique: direct laryngoscopy  Facilitating devices/methods: intubating stylet  Endotracheal tube insertion site: oral  Blade: Trupti  Blade size: #3  ETT size (mm): 7.0  Cormack-Lehane Classification: grade I - full view of glottis  Placement verified by: chest auscultation and capnometry   Cuff volume (mL): 7  Measured from: gums  ETT to gums (cm): 21  Number of attempts at approach: 1  Number of other approaches attempted: 0    Additional Comments  atraumatic

## 2023-10-28 VITALS
BODY MASS INDEX: 29.73 KG/M2 | RESPIRATION RATE: 16 BRPM | DIASTOLIC BLOOD PRESSURE: 69 MMHG | WEIGHT: 184.97 LBS | OXYGEN SATURATION: 96 % | SYSTOLIC BLOOD PRESSURE: 149 MMHG | HEART RATE: 56 BPM | HEIGHT: 66 IN | TEMPERATURE: 97.7 F

## 2023-10-28 PROCEDURE — 2500000004 HC RX 250 GENERAL PHARMACY W/ HCPCS (ALT 636 FOR OP/ED): Performed by: SURGERY

## 2023-10-28 RX ADMIN — SODIUM CHLORIDE 100 ML/HR: 9 INJECTION, SOLUTION INTRAVENOUS at 03:06

## 2023-10-28 ASSESSMENT — COGNITIVE AND FUNCTIONAL STATUS - GENERAL
HELP NEEDED FOR BATHING: A LITTLE
WALKING IN HOSPITAL ROOM: A LITTLE
MOBILITY SCORE: 20
MOVING TO AND FROM BED TO CHAIR: A LITTLE
CLIMB 3 TO 5 STEPS WITH RAILING: A LITTLE
STANDING UP FROM CHAIR USING ARMS: A LITTLE
DAILY ACTIVITIY SCORE: 22
DRESSING REGULAR LOWER BODY CLOTHING: A LITTLE

## 2023-10-28 NOTE — CARE PLAN
The patient's goals for the shift include  adequate rest and getting home    The clinical goals for the shift include  maintain safety and comfort    Over the shift, the patient did not make progress toward the following goals. Barriers to progression include fatigue. Recommendations to address these barriers include cluster care to allow adequate rest.      Problem: Pain  Goal: My pain/discomfort is manageable  Outcome: Progressing     Problem: Safety  Goal: Patient will be injury free during hospitalization  Outcome: Progressing  Goal: I will remain free of falls  Outcome: Progressing     Problem: Daily Care  Goal: Daily care needs are met  Outcome: Progressing     Problem: Psychosocial Needs  Goal: Demonstrates ability to cope with hospitalization/illness  Outcome: Progressing  Goal: Collaborate with me, my family, and caregiver to identify my specific goals  Outcome: Progressing     Problem: Discharge Barriers  Goal: My discharge needs are met  Outcome: Progressing

## 2023-10-28 NOTE — PROGRESS NOTES
Patient comfortable no nausea no vomiting    Afebrile vital signs stable    Abdomen soft    Long discussion held with the patient regarding to operative findings and the reason for stopping procedure    She will need outpatient pelvic MRI consultation to colorectal presenting for tumor board patient agreeable

## 2023-10-30 DIAGNOSIS — C20 RECTAL CANCER (MULTI): Primary | ICD-10-CM

## 2023-10-30 ASSESSMENT — PAIN SCALES - GENERAL: PAIN_LEVEL: 1

## 2023-10-30 NOTE — ANESTHESIA POSTPROCEDURE EVALUATION
Patient: Marquita Ibanez    Procedure Summary       Date: 10/27/23 Room / Location: [facility] / [facility]    Anesthesia Start: 1254 Anesthesia Stop: 1628    Procedure: DIAGNOSTIC LAPAROSCOPY, LYSIS OF ADHESIONS, URETERAL STENT INSERTION- DR D'AMICO Diagnosis:       Cancer of sigmoid colon (CMS/HCC)      (Cancer of sigmoid colon (CMS/HCC) [C18.7])    Surgeons: Pedro GUTIERREZ MD Responsible Provider: Tj Izaguirre MD    Anesthesia Type: general, regional ASA Status: 3            Anesthesia Type: general, regional    Vitals Value Taken Time   /61 10/27/23 1717   Temp 36.2 °C (97.2 °F) 10/27/23 1705   Pulse 66 10/27/23 1721   Resp 15 10/27/23 1721   SpO2 95 % 10/27/23 1721   Vitals shown include unvalidated device data.    Anesthesia Post Evaluation    Patient location during evaluation: PACU  Patient participation: complete - patient participated  Level of consciousness: awake and alert  Pain score: 1  Pain management: satisfactory to patient  Airway patency: patent  Cardiovascular status: stable  Respiratory status: acceptable  Hydration status: acceptable        No notable events documented.

## 2023-10-30 NOTE — DISCHARGE SUMMARY
Discharge Diagnosis  Cancer of sigmoid colon (CMS/HCC)    Issues Requiring Follow-Up  Follow-up with Dr. Landaverde for your surgery    Test Results Pending At Discharge  Pending Labs       No current pending labs.            Hospital Course   68-year-old female presented to the hospital with a mass.  She has a history of anal cancer status post chemo and radiation 20 years ago self-referred for near obstructing sigmoid mass biopsy proven for adenocarcinoma in [place].  She had a laparoscopic lysis of adhesions, ureteral stent insertion.  The procedure was stopped due to bulky disease and the patient would likely benefit from neoadjuvant treatment prior to TME and rectal resection.  Patient was updated on the plan of care and verbalized her understanding.  She was discharged in stable condition with follow-up.    Home Medications     Medication List      CONTINUE taking these medications     buPROPion  mg 24 hr tablet; Commonly known as: Wellbutrin XL   clobetasol 0.05 % cream; Commonly known as: Temovate   clopidogrel 75 mg tablet; Commonly known as: Plavix   hydrOXYzine HCL 25 mg tablet; Commonly known as: Atarax   lisinopril 10 mg tablet   metoprolol succinate XL 25 mg 24 hr tablet; Commonly known as: Toprol-XL   neomycin 500 mg tablet; Commonly known as: Mycifradin; Take two tablets   (1000 mg) by mouth at 3:00pm, 4:00pm and at bed time on the day prior to   surgery   rosuvastatin 5 mg tablet; Commonly known as: Crestor     STOP taking these medications     metroNIDAZOLE 500 mg tablet; Commonly known as: Flagyl       Outpatient Follow-Up  Future Appointments   Date Time Provider Department Center   1/16/2024  9:30 AM Kolton Lam MD [id number] [place]       Deysi Marie, APRN-CNP

## 2023-10-31 ENCOUNTER — TELEPHONE (OUTPATIENT)
Dept: SURGERY | Facility: HOSPITAL | Age: 68
End: 2023-10-31
Payer: COMMERCIAL

## 2023-10-31 DIAGNOSIS — C20 RECTAL CANCER (MULTI): Primary | ICD-10-CM

## 2023-11-06 NOTE — PROGRESS NOTES
[facility] in [facility]   CT a/p at [facility] in [place], [place]    Marquita Ibanez is a 68 y.o. female with a history of anal cancer 20 years ago for which she received chemo and radiation. She was scheduled for sigmoid resection with Dr. Stover for a rectosigmoid mass with biopsy proven adenocarcinoma in [place]. MMR intact. (No records of this available in Epic). On 10/27 and underwent lap THERON with ureteral stent insertion. The procedure was stopped due to bulky disease and the patient would likely benefit from neoadjuvant treatment prior to TME and rectal resection.     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 Prior to her colonoscopy she was moving her bowels 10x a day and stools were smaller; this change developed around summer 2023.  Some hematochezia. She feels like she is incompletely emptying. Does have some lower midline abdominal crampy discomfort, intermittent.  Occasional hematochezia.  Weight is stable.    CEA 10/2023: 20.7    MRI pelvis scheduled on mobile MRI 11/14    Cardiac stress test 10/20/23: Normal Lexiscan Myoview cardiac perfusion stress test.  No evidence of ischemia or myocardial infarction by perfusion imaging.  Normal left ventricular systolic function, ejection fraction 68%.  None invasive risk stratification is low risk.    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 She has a dog and 1 cat now.     Past Medical History:   Diagnosis Date    Cholelithiasis     High cholesterol     History of heart attack     History of rectal or anal cancer     20 yrs ago    Hypertension     Presence of dental  prosthetic device (complete) (partial)     upper and lower partail    Psoriasis     Wears glasses        Past Surgical History:   Procedure Laterality Date    ANUS SURGERY      APPENDECTOMY      CHOLECYSTECTOMY      COLONOSCOPY      CORONARY ANGIOPLASTY WITH STENT PLACEMENT      FLEXIBLE SIGMOIDOSCOPY      HYSTERECTOMY         No Known Allergies    Review of Systems   Constitutional:  Negative for activity change, appetite change, chills, fatigue, fever and unexpected weight change.   Respiratory:  Negative for cough, chest tightness and shortness of breath.    Gastrointestinal:  Positive for abdominal pain and blood in stool. Negative for diarrhea, nausea and vomiting.   Genitourinary:  Negative for difficulty urinating, dysuria and pelvic pain.   Skin:  Negative for color change.   All other systems reviewed and are negative.      Physical Exam  Constitutional:       Appearance: Normal appearance.   HENT:      Head: Normocephalic.   Eyes:      Pupils: Pupils are equal, round, and reactive to light.   Cardiovascular:      Rate and Rhythm: Normal rate and regular rhythm.      Pulses: Normal pulses.      Heart sounds: Normal heart sounds.   Pulmonary:      Effort: Pulmonary effort is normal.      Breath sounds: Normal breath sounds.   Abdominal:      General: There is no distension.      Palpations: Abdomen is soft. There is no mass.      Tenderness: There is no abdominal tenderness.      Hernia: No hernia is present.      Comments: 3 right sided laparoscopic incisional scars, 1 supra-umbilical midline incisional scar, 1 left wilfredo-abdomen incisional scar; old well-healed right groin scar.     Musculoskeletal:         General: Normal range of motion.      Cervical back: Normal range of motion.   Skin:     General: Skin is warm and dry.      Capillary Refill: Capillary refill takes less than 2 seconds.   Neurological:      General: No focal deficit present.      Mental Status: She is alert and oriented to person,  place, and time. Mental status is at baseline.   Psychiatric:         Mood and Affect: Mood normal.         Behavior: Behavior normal.         Thought Content: Thought content normal.         Judgment: Judgment normal.     No neck or inguinal LAD    Assessment and Plan:   #Upper rectal adenocarcinoma, MMR intact per review of OSH pathology report, S/P diagnostic laparoscopy with some colonic mobilization and THERON at [facility] 10/27/2023 with aborted attempt at oncologic resection  -  Will request OSH records for review including pathology and CT abdomen  -  Needs CT chest to complete staging  -  MRI rectum  -  Submit for MDTB discussion  -  Instructed to add daily miralax to daily stool softener use to improve bowel function  -  Follow-up after above for in-office flex sig    #Remote hx anal Ca S/P primary resection and CRT  -  Unclear if a candidate for additional pelvic XRT given previous exposure if pursuing neoadjuvant therapy  -  Will likely require proximal diversion for any primary anastomosis    #CAD s/p remote PCI w/ stent insertion  -  Continue plavix at this time  -  Was cleared by cardiology prior to diagnostic laparoscopy procedure    Gustavo Harp MD   11/8/2023  2:39 PM

## 2023-11-08 ENCOUNTER — OFFICE VISIT (OUTPATIENT)
Dept: SURGERY | Facility: CLINIC | Age: 68
End: 2023-11-08
Payer: COMMERCIAL

## 2023-11-08 VITALS
HEIGHT: 66 IN | SYSTOLIC BLOOD PRESSURE: 129 MMHG | TEMPERATURE: 97.9 F | DIASTOLIC BLOOD PRESSURE: 81 MMHG | BODY MASS INDEX: 30.37 KG/M2 | HEART RATE: 70 BPM | WEIGHT: 189 LBS

## 2023-11-08 DIAGNOSIS — Z98.61 CAD S/P PERCUTANEOUS CORONARY ANGIOPLASTY: ICD-10-CM

## 2023-11-08 DIAGNOSIS — C20 RECTAL CANCER (MULTI): Primary | ICD-10-CM

## 2023-11-08 DIAGNOSIS — I25.10 CAD S/P PERCUTANEOUS CORONARY ANGIOPLASTY: ICD-10-CM

## 2023-11-08 DIAGNOSIS — Z85.048 HISTORY OF ANAL CANCER: ICD-10-CM

## 2023-11-08 PROCEDURE — 1111F DSCHRG MED/CURRENT MED MERGE: CPT | Performed by: STUDENT IN AN ORGANIZED HEALTH CARE EDUCATION/TRAINING PROGRAM

## 2023-11-08 PROCEDURE — 3008F BODY MASS INDEX DOCD: CPT | Performed by: STUDENT IN AN ORGANIZED HEALTH CARE EDUCATION/TRAINING PROGRAM

## 2023-11-08 PROCEDURE — 1126F AMNT PAIN NOTED NONE PRSNT: CPT | Performed by: STUDENT IN AN ORGANIZED HEALTH CARE EDUCATION/TRAINING PROGRAM

## 2023-11-08 PROCEDURE — 99214 OFFICE O/P EST MOD 30 MIN: CPT | Performed by: STUDENT IN AN ORGANIZED HEALTH CARE EDUCATION/TRAINING PROGRAM

## 2023-11-08 PROCEDURE — 99204 OFFICE O/P NEW MOD 45 MIN: CPT | Performed by: STUDENT IN AN ORGANIZED HEALTH CARE EDUCATION/TRAINING PROGRAM

## 2023-11-08 PROCEDURE — 1159F MED LIST DOCD IN RCRD: CPT | Performed by: STUDENT IN AN ORGANIZED HEALTH CARE EDUCATION/TRAINING PROGRAM

## 2023-11-08 ASSESSMENT — ENCOUNTER SYMPTOMS
APPETITE CHANGE: 0
DYSURIA: 0
ABDOMINAL PAIN: 1
CHEST TIGHTNESS: 0
FATIGUE: 0
DIARRHEA: 0
COUGH: 0
VOMITING: 0
COLOR CHANGE: 0
DIFFICULTY URINATING: 0
CHILLS: 0
BLOOD IN STOOL: 1
FEVER: 0
UNEXPECTED WEIGHT CHANGE: 0
SHORTNESS OF BREATH: 0
ACTIVITY CHANGE: 0
NAUSEA: 0

## 2023-11-13 ENCOUNTER — LAB (OUTPATIENT)
Dept: LAB | Facility: LAB | Age: 68
End: 2023-11-13
Payer: COMMERCIAL

## 2023-11-13 DIAGNOSIS — C20 RECTAL CANCER (MULTI): ICD-10-CM

## 2023-11-13 LAB
ALBUMIN SERPL BCP-MCNC: 4.2 G/DL (ref 3.4–5)
ANION GAP SERPL CALC-SCNC: 12 MMOL/L (ref 10–20)
BUN SERPL-MCNC: 12 MG/DL (ref 6–23)
CALCIUM SERPL-MCNC: 10.1 MG/DL (ref 8.6–10.3)
CHLORIDE SERPL-SCNC: 106 MMOL/L (ref 98–107)
CO2 SERPL-SCNC: 25 MMOL/L (ref 21–32)
CREAT SERPL-MCNC: 0.98 MG/DL (ref 0.5–1.05)
GFR SERPL CREATININE-BSD FRML MDRD: 64 ML/MIN/1.73M*2
GLUCOSE SERPL-MCNC: 106 MG/DL (ref 74–99)
PHOSPHATE SERPL-MCNC: 2.6 MG/DL (ref 2.5–4.9)
POTASSIUM SERPL-SCNC: 4.3 MMOL/L (ref 3.5–5.3)
SODIUM SERPL-SCNC: 139 MMOL/L (ref 136–145)

## 2023-11-13 PROCEDURE — 36415 COLL VENOUS BLD VENIPUNCTURE: CPT

## 2023-11-13 PROCEDURE — 80069 RENAL FUNCTION PANEL: CPT

## 2023-11-14 ENCOUNTER — APPOINTMENT (OUTPATIENT)
Dept: RADIOLOGY | Facility: CLINIC | Age: 68
End: 2023-11-14
Payer: COMMERCIAL

## 2023-11-14 ENCOUNTER — PREP FOR PROCEDURE (OUTPATIENT)
Dept: SURGERY | Facility: HOSPITAL | Age: 68
End: 2023-11-14

## 2023-11-14 ENCOUNTER — ANCILLARY PROCEDURE (OUTPATIENT)
Dept: RADIOLOGY | Facility: CLINIC | Age: 68
End: 2023-11-14
Payer: COMMERCIAL

## 2023-11-14 DIAGNOSIS — C20 RECTAL CANCER (MULTI): ICD-10-CM

## 2023-11-14 DIAGNOSIS — C50.919 MALIGNANT NEOPLASM OF FEMALE BREAST, UNSPECIFIED ESTROGEN RECEPTOR STATUS, UNSPECIFIED LATERALITY, UNSPECIFIED SITE OF BREAST (MULTI): Primary | ICD-10-CM

## 2023-11-14 PROCEDURE — 71260 CT THORAX DX C+: CPT | Performed by: STUDENT IN AN ORGANIZED HEALTH CARE EDUCATION/TRAINING PROGRAM

## 2023-11-14 PROCEDURE — 71260 CT THORAX DX C+: CPT

## 2023-11-14 PROCEDURE — 2550000001 HC RX 255 CONTRASTS: Performed by: STUDENT IN AN ORGANIZED HEALTH CARE EDUCATION/TRAINING PROGRAM

## 2023-11-14 RX ORDER — HEPARIN SODIUM 5000 [USP'U]/ML
5000 INJECTION, SOLUTION INTRAVENOUS; SUBCUTANEOUS ONCE
Status: CANCELLED | OUTPATIENT
Start: 2023-11-14 | End: 2023-11-14

## 2023-11-14 RX ORDER — SODIUM CHLORIDE 9 MG/ML
100 INJECTION, SOLUTION INTRAVENOUS CONTINUOUS
Status: CANCELLED | OUTPATIENT
Start: 2023-11-14

## 2023-11-14 RX ADMIN — IOHEXOL 50 ML: 350 INJECTION, SOLUTION INTRAVENOUS at 10:48

## 2023-11-16 NOTE — PREPROCEDURE INSTRUCTIONS
No outpatient medications have been marked as taking for the 11/21/23 encounter (Hospital Encounter).          Appropriate clothing, bring glasses, center location, insurance information, remove jewerly, bring responsible adult . NPO after midnight as directed.

## 2023-11-20 DIAGNOSIS — C20 RECTAL CANCER (MULTI): Primary | ICD-10-CM

## 2023-11-21 ENCOUNTER — ANESTHESIA (OUTPATIENT)
Dept: OPERATING ROOM | Facility: HOSPITAL | Age: 68
End: 2023-11-21
Payer: COMMERCIAL

## 2023-11-21 ENCOUNTER — APPOINTMENT (OUTPATIENT)
Dept: RADIOLOGY | Facility: HOSPITAL | Age: 68
End: 2023-11-21
Payer: COMMERCIAL

## 2023-11-21 ENCOUNTER — ANESTHESIA EVENT (OUTPATIENT)
Dept: OPERATING ROOM | Facility: HOSPITAL | Age: 68
End: 2023-11-21
Payer: COMMERCIAL

## 2023-11-21 ENCOUNTER — HOSPITAL ENCOUNTER (OUTPATIENT)
Facility: HOSPITAL | Age: 68
Setting detail: OUTPATIENT SURGERY
Discharge: HOME | End: 2023-11-21
Attending: SURGERY | Admitting: SURGERY
Payer: COMMERCIAL

## 2023-11-21 VITALS
HEART RATE: 61 BPM | TEMPERATURE: 97 F | OXYGEN SATURATION: 96 % | BODY MASS INDEX: 30.12 KG/M2 | WEIGHT: 187.39 LBS | SYSTOLIC BLOOD PRESSURE: 148 MMHG | DIASTOLIC BLOOD PRESSURE: 63 MMHG | RESPIRATION RATE: 18 BRPM | HEIGHT: 66 IN

## 2023-11-21 DIAGNOSIS — C20 RECTAL CANCER (MULTI): ICD-10-CM

## 2023-11-21 DIAGNOSIS — C50.919 MALIGNANT NEOPLASM OF FEMALE BREAST, UNSPECIFIED ESTROGEN RECEPTOR STATUS, UNSPECIFIED LATERALITY, UNSPECIFIED SITE OF BREAST (MULTI): ICD-10-CM

## 2023-11-21 DIAGNOSIS — C20 MALIGNANT NEOPLASM OF RECTAL AMPULLA (MULTI): Primary | ICD-10-CM

## 2023-11-21 PROCEDURE — 7100000009 HC PHASE TWO TIME - INITIAL BASE CHARGE: Performed by: SURGERY

## 2023-11-21 PROCEDURE — 2500000005 HC RX 250 GENERAL PHARMACY W/O HCPCS: Performed by: SURGERY

## 2023-11-21 PROCEDURE — 7100000001 HC RECOVERY ROOM TIME - INITIAL BASE CHARGE: Performed by: SURGERY

## 2023-11-21 PROCEDURE — 3600000007 HC OR TIME - EACH INCREMENTAL 1 MINUTE - PROCEDURE LEVEL TWO: Performed by: SURGERY

## 2023-11-21 PROCEDURE — 2500000004 HC RX 250 GENERAL PHARMACY W/ HCPCS (ALT 636 FOR OP/ED): Performed by: SURGERY

## 2023-11-21 PROCEDURE — 3700000001 HC GENERAL ANESTHESIA TIME - INITIAL BASE CHARGE: Performed by: SURGERY

## 2023-11-21 PROCEDURE — 3600000002 HC OR TIME - INITIAL BASE CHARGE - PROCEDURE LEVEL TWO: Performed by: SURGERY

## 2023-11-21 PROCEDURE — 7100000002 HC RECOVERY ROOM TIME - EACH INCREMENTAL 1 MINUTE: Performed by: SURGERY

## 2023-11-21 PROCEDURE — 71045 X-RAY EXAM CHEST 1 VIEW: CPT | Mod: FY

## 2023-11-21 PROCEDURE — 2780000003 HC OR 278 NO HCPCS: Performed by: SURGERY

## 2023-11-21 PROCEDURE — 7100000010 HC PHASE TWO TIME - EACH INCREMENTAL 1 MINUTE: Performed by: SURGERY

## 2023-11-21 PROCEDURE — 96372 THER/PROPH/DIAG INJ SC/IM: CPT | Mod: 59 | Performed by: SURGERY

## 2023-11-21 PROCEDURE — 2500000005 HC RX 250 GENERAL PHARMACY W/O HCPCS: Performed by: NURSE ANESTHETIST, CERTIFIED REGISTERED

## 2023-11-21 PROCEDURE — 2500000004 HC RX 250 GENERAL PHARMACY W/ HCPCS (ALT 636 FOR OP/ED): Performed by: NURSE ANESTHETIST, CERTIFIED REGISTERED

## 2023-11-21 PROCEDURE — 76000 FLUOROSCOPY <1 HR PHYS/QHP: CPT

## 2023-11-21 PROCEDURE — C1788 PORT, INDWELLING, IMP: HCPCS | Performed by: SURGERY

## 2023-11-21 PROCEDURE — 3700000002 HC GENERAL ANESTHESIA TIME - EACH INCREMENTAL 1 MINUTE: Performed by: SURGERY

## 2023-11-21 PROCEDURE — 36561 INSERT TUNNELED CV CATH: CPT | Performed by: SURGERY

## 2023-11-21 PROCEDURE — 2500000004 HC RX 250 GENERAL PHARMACY W/ HCPCS (ALT 636 FOR OP/ED): Mod: JZ | Performed by: SURGERY

## 2023-11-21 PROCEDURE — A4217 STERILE WATER/SALINE, 500 ML: HCPCS | Performed by: SURGERY

## 2023-11-21 PROCEDURE — 71045 X-RAY EXAM CHEST 1 VIEW: CPT | Performed by: RADIOLOGY

## 2023-11-21 DEVICE — POWERPORT ISP M.R.I. IMPLANTABLE PORT WITH PRE-ATTACHED 9.6F  OPEN-ENDED SINGLE-LUMEN VENOUS CATHETER. INTERMEDIATE KIT (WITH SUTURE PLUGS)
Type: IMPLANTABLE DEVICE | Site: CHEST | Status: FUNCTIONAL
Brand: POWERPORT M.R.I.

## 2023-11-21 RX ORDER — ALBUTEROL SULFATE 0.83 MG/ML
2.5 SOLUTION RESPIRATORY (INHALATION) ONCE
Status: DISCONTINUED | OUTPATIENT
Start: 2023-11-21 | End: 2023-11-21 | Stop reason: HOSPADM

## 2023-11-21 RX ORDER — SODIUM CHLORIDE 9 MG/ML
100 INJECTION, SOLUTION INTRAVENOUS CONTINUOUS
Status: DISCONTINUED | OUTPATIENT
Start: 2023-11-21 | End: 2023-11-21 | Stop reason: HOSPADM

## 2023-11-21 RX ORDER — MIDAZOLAM HYDROCHLORIDE 1 MG/ML
INJECTION, SOLUTION INTRAMUSCULAR; INTRAVENOUS AS NEEDED
Status: DISCONTINUED | OUTPATIENT
Start: 2023-11-21 | End: 2023-11-21

## 2023-11-21 RX ORDER — SODIUM CHLORIDE, SODIUM LACTATE, POTASSIUM CHLORIDE, CALCIUM CHLORIDE 600; 310; 30; 20 MG/100ML; MG/100ML; MG/100ML; MG/100ML
100 INJECTION, SOLUTION INTRAVENOUS CONTINUOUS
Status: DISCONTINUED | OUTPATIENT
Start: 2023-11-21 | End: 2023-11-21 | Stop reason: HOSPADM

## 2023-11-21 RX ORDER — MEPERIDINE HYDROCHLORIDE 25 MG/ML
12.5 INJECTION INTRAMUSCULAR; INTRAVENOUS; SUBCUTANEOUS EVERY 10 MIN PRN
Status: DISCONTINUED | OUTPATIENT
Start: 2023-11-21 | End: 2023-11-21 | Stop reason: HOSPADM

## 2023-11-21 RX ORDER — GLYCOPYRROLATE 0.2 MG/ML
INJECTION INTRAMUSCULAR; INTRAVENOUS AS NEEDED
Status: DISCONTINUED | OUTPATIENT
Start: 2023-11-21 | End: 2023-11-21

## 2023-11-21 RX ORDER — LIDOCAINE HYDROCHLORIDE 20 MG/ML
INJECTION, SOLUTION INFILTRATION; PERINEURAL AS NEEDED
Status: DISCONTINUED | OUTPATIENT
Start: 2023-11-21 | End: 2023-11-21

## 2023-11-21 RX ORDER — HYDROCODONE BITARTRATE AND ACETAMINOPHEN 5; 325 MG/1; MG/1
1 TABLET ORAL EVERY 6 HOURS PRN
Qty: 12 TABLET | Refills: 0 | Status: SHIPPED | OUTPATIENT
Start: 2023-11-21 | End: 2023-11-28

## 2023-11-21 RX ORDER — LIDOCAINE HYDROCHLORIDE 10 MG/ML
0.1 INJECTION, SOLUTION EPIDURAL; INFILTRATION; INTRACAUDAL; PERINEURAL ONCE
Status: DISCONTINUED | OUTPATIENT
Start: 2023-11-21 | End: 2023-11-21 | Stop reason: HOSPADM

## 2023-11-21 RX ORDER — HEPARIN SODIUM 1000 [USP'U]/ML
INJECTION, SOLUTION INTRAVENOUS; SUBCUTANEOUS AS NEEDED
Status: DISCONTINUED | OUTPATIENT
Start: 2023-11-21 | End: 2023-11-21 | Stop reason: HOSPADM

## 2023-11-21 RX ORDER — PROPOFOL 10 MG/ML
INJECTION, EMULSION INTRAVENOUS AS NEEDED
Status: DISCONTINUED | OUTPATIENT
Start: 2023-11-21 | End: 2023-11-21

## 2023-11-21 RX ORDER — SODIUM CHLORIDE 0.9 G/100ML
IRRIGANT IRRIGATION AS NEEDED
Status: DISCONTINUED | OUTPATIENT
Start: 2023-11-21 | End: 2023-11-21 | Stop reason: HOSPADM

## 2023-11-21 RX ORDER — OXYCODONE HYDROCHLORIDE 5 MG/1
5 TABLET ORAL EVERY 4 HOURS PRN
Status: CANCELLED | OUTPATIENT
Start: 2023-11-21

## 2023-11-21 RX ORDER — ONDANSETRON HYDROCHLORIDE 2 MG/ML
INJECTION, SOLUTION INTRAVENOUS AS NEEDED
Status: DISCONTINUED | OUTPATIENT
Start: 2023-11-21 | End: 2023-11-21

## 2023-11-21 RX ORDER — CEFAZOLIN SODIUM 2 G/100ML
2 INJECTION, SOLUTION INTRAVENOUS ONCE
Status: COMPLETED | OUTPATIENT
Start: 2023-11-21 | End: 2023-11-21

## 2023-11-21 RX ORDER — BUPIVACAINE HCL/EPINEPHRINE 0.25-.0005
VIAL (ML) INJECTION AS NEEDED
Status: DISCONTINUED | OUTPATIENT
Start: 2023-11-21 | End: 2023-11-21 | Stop reason: HOSPADM

## 2023-11-21 RX ORDER — MIDAZOLAM HYDROCHLORIDE 1 MG/ML
1 INJECTION, SOLUTION INTRAMUSCULAR; INTRAVENOUS ONCE AS NEEDED
Status: DISCONTINUED | OUTPATIENT
Start: 2023-11-21 | End: 2023-11-21 | Stop reason: HOSPADM

## 2023-11-21 RX ORDER — HEPARIN SODIUM 5000 [USP'U]/ML
5000 INJECTION, SOLUTION INTRAVENOUS; SUBCUTANEOUS ONCE
Status: COMPLETED | OUTPATIENT
Start: 2023-11-21 | End: 2023-11-21

## 2023-11-21 RX ORDER — PROPOFOL 10 MG/ML
INJECTION, EMULSION INTRAVENOUS CONTINUOUS PRN
Status: DISCONTINUED | OUTPATIENT
Start: 2023-11-21 | End: 2023-11-21

## 2023-11-21 RX ORDER — DROPERIDOL 2.5 MG/ML
0.62 INJECTION, SOLUTION INTRAMUSCULAR; INTRAVENOUS ONCE AS NEEDED
Status: DISCONTINUED | OUTPATIENT
Start: 2023-11-21 | End: 2023-11-21 | Stop reason: HOSPADM

## 2023-11-21 RX ORDER — LABETALOL HYDROCHLORIDE 5 MG/ML
5 INJECTION, SOLUTION INTRAVENOUS ONCE AS NEEDED
Status: DISCONTINUED | OUTPATIENT
Start: 2023-11-21 | End: 2023-11-21 | Stop reason: HOSPADM

## 2023-11-21 RX ADMIN — MIDAZOLAM 2 MG: 1 INJECTION INTRAMUSCULAR; INTRAVENOUS at 09:45

## 2023-11-21 RX ADMIN — SODIUM CHLORIDE 100 ML/HR: 9 INJECTION, SOLUTION INTRAVENOUS at 09:16

## 2023-11-21 RX ADMIN — GLYCOPYRROLATE 0.2 MG: 0.2 INJECTION, SOLUTION INTRAMUSCULAR; INTRAVENOUS at 10:04

## 2023-11-21 RX ADMIN — ONDANSETRON 4 MG: 2 INJECTION INTRAMUSCULAR; INTRAVENOUS at 10:07

## 2023-11-21 RX ADMIN — HEPARIN SODIUM 5000 UNITS: 5000 INJECTION INTRAVENOUS; SUBCUTANEOUS at 09:21

## 2023-11-21 RX ADMIN — PROPOFOL 75 MCG/KG/MIN: 10 INJECTION, EMULSION INTRAVENOUS at 09:45

## 2023-11-21 RX ADMIN — CEFAZOLIN SODIUM 2 G: 2 INJECTION, SOLUTION INTRAVENOUS at 09:36

## 2023-11-21 RX ADMIN — PROPOFOL 50 MG: 10 INJECTION, EMULSION INTRAVENOUS at 09:45

## 2023-11-21 RX ADMIN — LIDOCAINE HYDROCHLORIDE 100 ML: 20 INJECTION, SOLUTION INFILTRATION; PERINEURAL at 09:45

## 2023-11-21 SDOH — HEALTH STABILITY: MENTAL HEALTH: CURRENT SMOKER: 1

## 2023-11-21 ASSESSMENT — PAIN - FUNCTIONAL ASSESSMENT
PAIN_FUNCTIONAL_ASSESSMENT: 0-10

## 2023-11-21 ASSESSMENT — PAIN SCALES - GENERAL
PAIN_LEVEL: 0
PAINLEVEL_OUTOF10: 0 - NO PAIN

## 2023-11-21 NOTE — ANESTHESIA POSTPROCEDURE EVALUATION
"Patient: Janis Ibanez \"Marquita\"    Procedure Summary       Date: 11/21/23 Room / Location: [facility] 01 / [facility]    Anesthesia Start: 0936 Anesthesia Stop: 1054    Procedure: Line Placement Subcutaneous Port,  LEFT IJ Diagnosis:       Malignant neoplasm of rectal ampulla (CMS/HCC)      (Malignant neoplasm of rectal ampulla (CMS/HCC) [C20])    Surgeons: Pedro GUTIERREZ MD Responsible Provider: Tj Izaguirre MD    Anesthesia Type: MAC ASA Status: 2            Anesthesia Type: MAC    Vitals Value Taken Time   /74 11/21/23 1050   Temp 36.3 11/21/23 1054   Pulse 66 11/21/23 1053   Resp 17 11/21/23 1053   SpO2 97 % 11/21/23 1053   Vitals shown include unvalidated device data.    Anesthesia Post Evaluation    Patient location during evaluation: PACU  Patient participation: complete - patient participated  Level of consciousness: awake and awake and alert  Pain score: 0  Pain management: adequate  Airway patency: patent  Cardiovascular status: acceptable, stable, blood pressure returned to baseline and hemodynamically stable  Respiratory status: acceptable and nasal cannula  Hydration status: acceptable  Postoperative Nausea and Vomiting: none        There were no known notable events for this encounter.    "

## 2023-11-21 NOTE — OP NOTE
"Line Placement Subcutaneous Port,  LEFT IJ Operative Note     Date: 2023  OR Location: [facility]    Name: Janis Ibanez \"Marquita\", : 1955, Age: 68 y.o., MRN: [id number], Sex: female    Diagnosis  Pre-op Diagnosis     * Malignant neoplasm of rectal ampulla (CMS/HCC) [C20] Post-op Diagnosis     * Malignant neoplasm of rectal ampulla (CMS/HCC) [C20]     Procedures  Line Placement Subcutaneous Port,  LEFT IJ  26439 - MO INSJ TUNNELED CTR VAD W/SUBQ PORT AGE 5 YR/>      Surgeons      * Pedro Olivares V - Primary    Resident/Fellow/Other Assistant:  Surgeon(s) and Role:  Cole aCsarez  Procedure Summary  Anesthesia: Monitor Anesthesia Care  ASA: II  Anesthesia Staff: Anesthesiologist: Tj Izaguirre MD  CRNA: SHARMILA James-CRNA  Estimated Blood Loss: Minimal mL  Intra-op Medications:   Medication Name Total Dose   heparin 1,000 unit/mL injection 5,000 Units   sodium chloride 0.9 % irrigation solution 1,000 mL   BUPivacaine-EPINEPHrine (Marcaine w/EPI) 0.25 %-1:200,000 injection 10 mL   heparin 10,000 Units in sodium chloride 0.9% 100 mL IVPB 100 mL   ceFAZolin in dextrose (iso-os) (Ancef) IVPB 2 g 2 g              Anesthesia Record               Intraprocedure I/O Totals          Intake    Propofol Drip 0.00 mL    The total shown is the total volume documented since Anesthesia Start was filed.    Total Intake 0 mL          Specimen: No specimens collected     Staff:   Circulator: Araseli Torre RN  Scrub Person: Kaden Hatch         Drains and/or Catheters:   NG/OG/Feeding Tube (Active)       Tourniquet Times:         Implants:  Implants       Type Name Action Serial No.      Implant POWER PORT, MRI, ISP, 9.6FR, OPEN END - [id number] Implanted               Findings: Normal anatomy    Indications: Marquita Ibanez is an 68 y.o. female who is having surgery for Malignant neoplasm of rectal ampulla (CMS/HCC) [C20].     The patient was seen in the preoperative area. The risks, benefits, complications, treatment " options, non-operative alternatives, expected recovery and outcomes were discussed with the patient. The possibilities of reaction to medication, pulmonary aspiration, injury to surrounding structures, bleeding, recurrent infection, the need for additional procedures, failure to diagnose a condition, and creating a complication requiring transfusion or operation were discussed with the patient. The patient concurred with the proposed plan, giving informed consent.  The site of surgery was properly noted/marked if necessary per policy. The patient has been actively warmed in preoperative area. Preoperative antibiotics have been ordered and given within 1 hours of incision. Venous thrombosis prophylaxis have been ordered including bilateral sequential compression devices and chemical prophylaxis    Procedure Details: Patient was brought to the OR laid supine preoperative huddle was performed and all team members participated.  Patient then placed under IV sedation.  Both arms were tucked and padded.  Both sides the neck and chest were prepped and draped in standard surgical fashion.  Timeout procedures were observed and we elected to proceed at this time    Local anesthetic was instilled below the left clavicle.  Skin incision made dissection was carried down in a pocket created bluntly and with cautery.  Patient was placed in Trendelenburg position.  3 attempts were made to cannulate the left subclavian vein these were not successful.  Therefore the left internal jugular vein was cannulated using a 25-gauge finder needle.  The larger needle was used to cannulate the left internal jugular vein and guidewire passed under fluoroscopic guidance without any complication.  Small skin incision made at the wire entry site.  The port and catheter was flushed tunneled up through the superior incision and divided at the 25 cm el at a bevel after being measured fluoroscopically.  Under fluoroscopic guidance the dilator and  introducer were placed over the wire without issues and the dilator and the wire removed.  The catheter was placed through the peel-away sheath the peel-away sheath removed.  The course of the catheter appeared without kinks it aspirated and flushed easily.  Heparin dwell solution was placed without resistance.  Wound was closed with 3-0 Vicryl for Monocryl skin adhesive patient tolerated procedure well discussed with son in detail  Complications:  None; patient tolerated the procedure well.    Disposition: PACU - hemodynamically stable.  Condition: stable         Additional Details:     Attending Attestation: I performed the procedure.    Pedro Olivares V  Phone Number: [phone]

## 2023-11-21 NOTE — DISCHARGE INSTRUCTIONS
Moderate Sedation in Adults Discharge Instructions    About this topic  Moderate sedation is also known as conscious sedation. It changes your state of being awake or consciousness. With this sedation, you may feel slight pain or pressure during a procedure. The drugs help you to relax and may even allow you to sleep. It will be easy to wake you and you may talk and answer questions while under sedation. Most likely, you will not remember what happens while under this sedation.  What care is needed at home?  Ask your doctor what you need to do when you go home. Make sure you understand everything the doctor says. This way you will know what you need to do.  You will not be allowed to drive right away after the procedure. Ask a family member or a friend to drive you home.  Do not operate heavy or dangerous machinery for at least 24 hours.  Do not make major decisions or sign important papers for at least 24 hours. You may not be thinking clearly.  Avoid beer, wine, or mixed drinks (alcohol) for at least 24 hours.  You are at a higher risk of falling for at least 24 hours after moderate sedation.  Take extra care when you get up.  Do not change positions quickly.  Do not rush when you need to go to the bathroom or to answer the phone.  Ask for help if you feel unsteady when you try to walk.  Wear shoes with non-slip soles and low heels.  What follow-up care is needed?  Your doctor may ask you to make visits to the office to check on your progress. Be sure to keep these visits. Your doctor may also refer you to other doctors or tell you that you need more tests or care.  What drugs may be needed?  The doctor may order drugs to:  Help with pain  Treat an upset stomach or throwing up  Will physical activity be limited?  Rest for the day of the procedure. Avoid strenuous activities like heavy lifting and hard exercise. Talk to your doctor about whether you need to limit lifting or exercise after your procedure.  What  changes to diet are needed?  Start with a light diet when you are fully awake. This includes things that are easy to swallow like soups, pudding, jello, toast, and eggs. Slowly progress to your normal diet.  What problems could happen?  Low blood pressure  Breathing problems  Upset stomach or throwing up  Dizziness  When do I need to call the doctor?  Feel dizzy, weak, or tired  Faint  Very bad headache  Upset stomach or throwing up  To follow up for more tests or care  Teach Back: Helping You Understand  The Teach Back Method helps you understand the information we are giving you. After you talk with the staff, tell them in your own words what you learned. This helps to make sure the staff has described each thing clearly. It also helps to explain things that may have been confusing. Before going home, make sure you can do these:  I can tell you about my procedure.  I can tell you if I need more tests or care.  I can tell you what is good for me to eat and drink the next day.  I can tell you what I would do if I feel dizzy, weak, or tired.  Last Reviewed Date  2020-03-02

## 2023-11-21 NOTE — ANESTHESIA PREPROCEDURE EVALUATION
"Patient: Janis Ibanez \"Marquita\"    Procedure Information       Date/Time: 11/21/23 0930    Procedure: Line Placement Subcutaneous Port    Location: [facility] / [facility]    Surgeons: Pedro GUTIERREZ MD            Relevant Problems   Anesthesia (within normal limits)      Cardiovascular   (+) CAD S/P percutaneous coronary angioplasty   (+) MI (myocardial infarction) (CMS/HCC)   (+) Mixed hyperlipidemia      Endocrine (within normal limits)      GI   (+) Cancer of sigmoid colon (CMS/HCC)   (+) Malignant neoplasm of rectal ampulla (CMS/HCC)   (+) Rectal cancer (CMS/HCC)      /Renal (within normal limits)      Neuro/Psych (within normal limits)      Pulmonary (within normal limits)      GI/Hepatic   (+) Cancer of sigmoid colon (CMS/HCC)   (+) Malignant neoplasm of rectal ampulla (CMS/HCC)   (+) Rectal cancer (CMS/HCC)      Hematology (within normal limits)      Musculoskeletal (within normal limits)      Eyes, Ears, Nose, and Throat (within normal limits)      Infectious Disease (within normal limits)       Clinical information reviewed:   Tobacco  Allergies  Meds   Med Hx  Surg Hx  OB Status  Fam Hx  Soc   Hx        NPO Detail:  NPO/Void Status  Carbonhydrate Drink Given Prior to Surgery? : N  Date of Last Liquid: 11/20/23  Time of Last Liquid: 2100  Date of Last Solid: 11/20/23  Time of Last Solid: 1800  Last Intake Type: Clear fluids (sips of water with  morning med)  Time of Last Void: 0800         Physical Exam    Airway  Mallampati: II     Cardiovascular - normal exam  Rhythm: regular     Dental - normal exam     Pulmonary - normal exam     Abdominal - normal exam             Anesthesia Plan    ASA 2     MAC     The patient is a current smoker.  Patient was not previously instructed to abstain from smoking on day of procedure.  Patient smoked on day of procedure.    intravenous induction   Anesthetic plan and risks discussed with patient.    Plan discussed with CRNA.      "

## 2023-11-22 DIAGNOSIS — E78.2 MIXED HYPERLIPIDEMIA: ICD-10-CM

## 2023-11-22 DIAGNOSIS — Z98.61 CAD S/P PERCUTANEOUS CORONARY ANGIOPLASTY: ICD-10-CM

## 2023-11-22 DIAGNOSIS — I25.10 CAD S/P PERCUTANEOUS CORONARY ANGIOPLASTY: ICD-10-CM

## 2023-11-22 RX ORDER — LISINOPRIL 10 MG/1
10 TABLET ORAL 2 TIMES DAILY
Qty: 180 TABLET | Refills: 3 | Status: SHIPPED | OUTPATIENT
Start: 2023-11-22 | End: 2024-02-13 | Stop reason: SDUPTHER

## 2023-11-22 RX ORDER — ROSUVASTATIN CALCIUM 5 MG/1
5 TABLET, COATED ORAL DAILY
Qty: 90 TABLET | Refills: 3 | Status: SHIPPED | OUTPATIENT
Start: 2023-11-22 | End: 2024-02-13 | Stop reason: SDUPTHER

## 2023-11-22 RX ORDER — METOPROLOL SUCCINATE 25 MG/1
25 TABLET, EXTENDED RELEASE ORAL DAILY
Qty: 90 TABLET | Refills: 3 | Status: SHIPPED | OUTPATIENT
Start: 2023-11-22 | End: 2024-02-13 | Stop reason: SDUPTHER

## 2023-11-24 ENCOUNTER — HOSPITAL ENCOUNTER (OUTPATIENT)
Dept: RADIOLOGY | Facility: HOSPITAL | Age: 68
Discharge: HOME | End: 2023-11-24
Payer: COMMERCIAL

## 2023-11-24 DIAGNOSIS — C20 RECTAL CANCER (MULTI): ICD-10-CM

## 2023-11-24 PROCEDURE — A9575 INJ GADOTERATE MEGLUMI 0.1ML: HCPCS | Performed by: STUDENT IN AN ORGANIZED HEALTH CARE EDUCATION/TRAINING PROGRAM

## 2023-11-24 PROCEDURE — 72197 MRI PELVIS W/O & W/DYE: CPT | Performed by: RADIOLOGY

## 2023-11-24 PROCEDURE — 2550000001 HC RX 255 CONTRASTS: Performed by: STUDENT IN AN ORGANIZED HEALTH CARE EDUCATION/TRAINING PROGRAM

## 2023-11-24 PROCEDURE — 96372 THER/PROPH/DIAG INJ SC/IM: CPT | Performed by: STUDENT IN AN ORGANIZED HEALTH CARE EDUCATION/TRAINING PROGRAM

## 2023-11-24 PROCEDURE — 72197 MRI PELVIS W/O & W/DYE: CPT

## 2023-11-24 PROCEDURE — 2500000004 HC RX 250 GENERAL PHARMACY W/ HCPCS (ALT 636 FOR OP/ED): Mod: JZ | Performed by: STUDENT IN AN ORGANIZED HEALTH CARE EDUCATION/TRAINING PROGRAM

## 2023-11-24 RX ORDER — GADOTERATE MEGLUMINE 376.9 MG/ML
17 INJECTION INTRAVENOUS
Status: COMPLETED | OUTPATIENT
Start: 2023-11-24 | End: 2023-11-24

## 2023-11-24 RX ADMIN — GLUCAGON 1 MG: 1 INJECTION, POWDER, LYOPHILIZED, FOR SOLUTION INTRAMUSCULAR; INTRAVENOUS at 13:30

## 2023-11-24 RX ADMIN — GADOTERATE MEGLUMINE 17 ML: 376.9 INJECTION INTRAVENOUS at 14:41

## 2023-11-27 ASSESSMENT — ENCOUNTER SYMPTOMS
CHEST TIGHTNESS: 0
VOMITING: 0
FATIGUE: 0
FEVER: 0
APPETITE CHANGE: 0
ACTIVITY CHANGE: 0
DYSURIA: 0
DIARRHEA: 0
SHORTNESS OF BREATH: 0
COLOR CHANGE: 0
CHILLS: 0
DIFFICULTY URINATING: 0
ABDOMINAL PAIN: 1
NAUSEA: 0
BLOOD IN STOOL: 1
COUGH: 0
UNEXPECTED WEIGHT CHANGE: 0

## 2023-11-27 NOTE — PROGRESS NOTES
FRANCINE Ibanez is a 68 y.o. female with a history of anal cancer 20 years ago for which she received chemo and radiation. She was scheduled for sigmoid resection with Dr. Stover for a rectosigmoid mass with biopsy proven adenocarcinoma in [place]. MMR intact. (No records of this available in Epic). On 10/27 and underwent lap THERON with ureteral stent insertion. The procedure was stopped due to bulky disease.    CT chest and MRI rectum were performed since last visit. She was seen by oncology and had a port placed on 11/21/23. Presented on MDTB 11/29/23.     CEA 10/2023: 20.7    CT a/p 9/27/23 ([place]): Thickening of the distal sigmoid/rectal region which could be the area of clinical concern in patient with history of sigmoid cancer. There is no adenopathy or evidence of pelvic lymph node enlargement. There are a few small indeterminant lesions in the periphery of the right lobe of the liver which are not adequately evaluated. Further evaluation with MRI of the liver or PET/CT recommended.     CT chest 11/15/23: Nonspecific 5 mm left lower lobe nodule and borderline enlarged intrathoracic lymph nodes. Suggest follow-up in 6 months. Stable subcentimeter left hepatic lesion since 09/27/2023, too small to definitively characterize. Attention on follow-up examination.    MRI rectum 11/24/23: Rectal mass as detailed above is consistent with rectal neoplasm.  MRI based staging is: The MR imaging based stage of this rectal cancer is T4 with involvement of mesorectal fascia and posterior peritoneal reflection which demonstrates irregularity and enhancement There are multiple subcentimeter presacral and mesorectal lymph nodes measuring up to 7 mm, which are suspicious for michelle metastasis. However given patient's history of prior radiation therapy, reactive lymph nodes are in additional consideration.    Cardiac stress test 10/20/23: Normal Lexiscan Myoview cardiac perfusion stress test. No evidence of ischemia or  myocardial infarction by perfusion imaging. Normal left ventricular systolic function, ejection fraction 68%. None invasive risk stratification is low risk.    Colonoscopy 9/8/23 (in [place]): Prep of the colon was poor. Hemorrhoids found on perianal exam. Stool in the rectum and in the recto-sigmoid colon. Rule out malignancy, completely obstructing tumor in the recto-sigmoid colon. Biopsied. Tattooed. Path demonstrating invasive adenocarcinoma. MMR intact.     Patient returns today to discuss MDTB recommendations.  Patient denies any new complaints.  She has started chemotherapy; follows with Dr. Mayes.  Patient is due to finish chemotherapy 3/2024.  She is due to see rad-onc later this month.  She believes her rad-onc appt is with Dr. Flor.    Current smoker- 6 cigarettes per day/No ETOH/No Illicit drug use  No family history of CRC or IBD  PMH: MI, coronary stents about 10 years ago   PSH: open cholecystectomy, open appendectomy, open hysterectomy, vascular surgery on right groin for blockage    Past Medical History:   Diagnosis Date    Cholelithiasis     High cholesterol     History of heart attack 2015    History of rectal or anal cancer     20 yrs ago    Hypertension     Presence of dental prosthetic device (complete) (partial)     upper and lower partail    Psoriasis     Wears glasses        Past Surgical History:   Procedure Laterality Date    ANUS SURGERY      APPENDECTOMY      CHOLECYSTECTOMY      COLONOSCOPY      CORONARY ANGIOPLASTY WITH STENT PLACEMENT      FLEXIBLE SIGMOIDOSCOPY      HYSTERECTOMY         No Known Allergies    Review of Systems   Constitutional:  Negative for activity change, appetite change, chills, fatigue, fever and unexpected weight change.   Respiratory:  Negative for cough, chest tightness and shortness of breath.    Gastrointestinal:  Positive for abdominal pain and blood in stool. Negative for diarrhea, nausea and vomiting.   Genitourinary:  Negative for difficulty  urinating, dysuria and pelvic pain.   Skin:  Negative for color change.   All other systems reviewed and are negative.      Physical Exam  Constitutional:       Appearance: Normal appearance.   HENT:      Head: Normocephalic.   Eyes:      Pupils: Pupils are equal, round, and reactive to light.   Cardiovascular:      Rate and Rhythm: Normal rate and regular rhythm.      Pulses: Normal pulses.      Heart sounds: Normal heart sounds.   Pulmonary:      Effort: Pulmonary effort is normal.      Breath sounds: Normal breath sounds.   Abdominal:      General: There is no distension.      Palpations: Abdomen is soft. There is no mass.      Tenderness: There is no abdominal tenderness.      Hernia: No hernia is present.      Comments: 3 right sided laparoscopic incisional scars, 1 supra-umbilical midline incisional scar, 1 left wilfredo-abdomen incisional scar; old well-healed right groin scar.     Musculoskeletal:         General: Normal range of motion.      Cervical back: Normal range of motion.   Skin:     General: Skin is warm and dry.      Capillary Refill: Capillary refill takes less than 2 seconds.   Neurological:      General: No focal deficit present.      Mental Status: She is alert and oriented to person, place, and time. Mental status is at baseline.   Psychiatric:         Mood and Affect: Mood normal.         Behavior: Behavior normal.         Thought Content: Thought content normal.         Judgment: Judgment normal.     No neck or inguinal LAD    Assessment and Plan:   #Upper rectal adenocarcinoma, MMR intact per review of OSH pathology report, S/P diagnostic laparoscopy with some colonic mobilization and THERON at [facility] 10/27/2023 with aborted attempt at oncologic resection  -  Proceed and continue with chemotherapy; will plan to restage after completion of chemotherapy  -  Unlikely candidate for additional radiation   -  Consideration for repeat colonoscopy following chemotherapy given poor prep on last study; plan  for end of 3/2024  -  Follow-up with me 3/2024    #Remote hx anal Ca S/P primary resection and CRT  -  Will likely require proximal diversion for any primary anastomosis    #CAD s/p remote PCI w/ stent insertion  -  Was cleared by cardiology prior to diagnostic laparoscopy procedure    Gustavo Harp MD   12/6/2023  3:45 PM

## 2023-11-29 ENCOUNTER — TUMOR BOARD CONFERENCE (OUTPATIENT)
Dept: HEMATOLOGY/ONCOLOGY | Facility: HOSPITAL | Age: 68
End: 2023-11-29
Payer: COMMERCIAL

## 2023-11-29 ENCOUNTER — APPOINTMENT (OUTPATIENT)
Dept: HEMATOLOGY/ONCOLOGY | Facility: HOSPITAL | Age: 68
End: 2023-11-29
Payer: COMMERCIAL

## 2023-11-29 NOTE — TUMOR BOARD NOTE
Tumor Board Documentation    Marquita Ibanez was presented by Gustavo Mac at our Tumor Board on 11/29/2023, which included representatives from  .    Marquita currently presents as   with history of the following treatments:  .    Additionally, we reviewed previous medical and familial history, history of present illness, and recent lab results along with all available histopathologic and imaging studies. The tumor board considered available treatment options and made the following recommendations:       The following procedures/referrals were also placed: No orders of the defined types were placed in this encounter.        Clinical Trial Status:       National site-specific guidelines were discussed with respect to the case.         New rectal cancer.  Attempted laparoscopic partial colectomy aborted for sigmoid cancer.. CT chest and MRI rectum performed.    Prior radiation for anal cancer in past.    Sigmoid cancer.  Thickening peritoneal reflection and tumor extends to serosal margin on MRI. T4, N indeterminate    10 cm from anal verge and 3 cm long. Borderline lymph nodes.  MMI intact.    Plan  Referred to oncology. About to start chemotherapy.  Triplet chemo.  Unlikely to need upfront radiation.

## 2023-12-06 ENCOUNTER — OFFICE VISIT (OUTPATIENT)
Dept: SURGERY | Facility: CLINIC | Age: 68
End: 2023-12-06
Payer: COMMERCIAL

## 2023-12-06 VITALS
TEMPERATURE: 98.2 F | BODY MASS INDEX: 30.05 KG/M2 | HEIGHT: 66 IN | HEART RATE: 73 BPM | DIASTOLIC BLOOD PRESSURE: 77 MMHG | SYSTOLIC BLOOD PRESSURE: 127 MMHG | WEIGHT: 187 LBS

## 2023-12-06 DIAGNOSIS — Z98.61 CAD S/P PERCUTANEOUS CORONARY ANGIOPLASTY: ICD-10-CM

## 2023-12-06 DIAGNOSIS — C20 MALIGNANT NEOPLASM OF RECTAL AMPULLA (MULTI): ICD-10-CM

## 2023-12-06 DIAGNOSIS — I25.10 CAD S/P PERCUTANEOUS CORONARY ANGIOPLASTY: ICD-10-CM

## 2023-12-06 DIAGNOSIS — Z85.048 HISTORY OF ANAL CANCER: Primary | ICD-10-CM

## 2023-12-06 PROCEDURE — 3008F BODY MASS INDEX DOCD: CPT | Performed by: STUDENT IN AN ORGANIZED HEALTH CARE EDUCATION/TRAINING PROGRAM

## 2023-12-06 PROCEDURE — 1159F MED LIST DOCD IN RCRD: CPT | Performed by: STUDENT IN AN ORGANIZED HEALTH CARE EDUCATION/TRAINING PROGRAM

## 2023-12-06 PROCEDURE — 1126F AMNT PAIN NOTED NONE PRSNT: CPT | Performed by: STUDENT IN AN ORGANIZED HEALTH CARE EDUCATION/TRAINING PROGRAM

## 2023-12-06 PROCEDURE — 99213 OFFICE O/P EST LOW 20 MIN: CPT | Performed by: STUDENT IN AN ORGANIZED HEALTH CARE EDUCATION/TRAINING PROGRAM

## 2023-12-06 RX ORDER — PROCHLORPERAZINE MALEATE 5 MG
5 TABLET ORAL EVERY 6 HOURS PRN
COMMUNITY

## 2023-12-06 RX ORDER — LIDOCAINE 50 MG/G
OINTMENT TOPICAL AS NEEDED
COMMUNITY
End: 2024-05-23 | Stop reason: HOSPADM

## 2023-12-06 RX ORDER — DOCUSATE SODIUM 100 MG/1
100 CAPSULE, LIQUID FILLED ORAL NIGHTLY
COMMUNITY
End: 2024-05-23 | Stop reason: HOSPADM

## 2023-12-13 ENCOUNTER — HOSPITAL ENCOUNTER (OUTPATIENT)
Dept: RADIATION ONCOLOGY | Age: 68
Discharge: HOME | End: 2023-12-13
Payer: COMMERCIAL

## 2023-12-13 VITALS
SYSTOLIC BLOOD PRESSURE: 138 MMHG | TEMPERATURE: 97.4 F | BODY MASS INDEX: 30.41 KG/M2 | HEART RATE: 61 BPM | DIASTOLIC BLOOD PRESSURE: 64 MMHG | RESPIRATION RATE: 18 BRPM | HEIGHT: 66 IN | OXYGEN SATURATION: 98 % | WEIGHT: 189.2 LBS

## 2023-12-13 DIAGNOSIS — C20 RECTAL CANCER (HCC): Primary | ICD-10-CM

## 2023-12-13 PROCEDURE — 99205 OFFICE O/P NEW HI 60 MIN: CPT | Performed by: RADIOLOGY

## 2023-12-13 PROCEDURE — 99497 ADVNCD CARE PLAN 30 MIN: CPT | Performed by: RADIOLOGY

## 2023-12-13 PROCEDURE — 99212 OFFICE O/P EST SF 10 MIN: CPT | Performed by: RADIOLOGY

## 2023-12-13 PROCEDURE — NBSRV NON-BILLABLE SERVICE: Performed by: RADIOLOGY

## 2023-12-13 RX ORDER — PSEUDOEPHEDRINE HCL 30 MG
100 TABLET ORAL 2 TIMES DAILY
COMMUNITY

## 2023-12-13 RX ORDER — ROSUVASTATIN CALCIUM 5 MG/1
5 TABLET, COATED ORAL DAILY
Qty: 30 TABLET | Refills: 17 | COMMUNITY
Start: 2023-06-08 | End: 2024-11-21

## 2023-12-13 RX ORDER — LIDOCAINE 50 MG/G
OINTMENT TOPICAL PRN
COMMUNITY

## 2023-12-13 RX ORDER — ACETAMINOPHEN 325 MG/1
2 TABLET ORAL AS NEEDED
COMMUNITY

## 2023-12-13 RX ORDER — PROCHLORPERAZINE MALEATE 5 MG/1
5 TABLET ORAL EVERY 6 HOURS PRN
COMMUNITY

## 2023-12-13 RX ORDER — CLOBETASOL PROPIONATE 0.5 MG/G
1 CREAM TOPICAL 2 TIMES DAILY PRN
COMMUNITY

## 2023-12-13 RX ORDER — METOPROLOL SUCCINATE 25 MG/1
25 TABLET, EXTENDED RELEASE ORAL DAILY
Qty: 30 TABLET | Refills: 17 | COMMUNITY
Start: 2023-06-08 | End: 2024-11-21

## 2023-12-13 RX ORDER — LISINOPRIL 10 MG/1
10 TABLET ORAL 2 TIMES DAILY
Qty: 60 TABLET | Refills: 17 | COMMUNITY
Start: 2023-06-08 | End: 2024-11-21

## 2023-12-13 RX ORDER — CLOPIDOGREL BISULFATE 75 MG/1
75 TABLET ORAL DAILY
COMMUNITY
Start: 2023-06-08

## 2023-12-13 NOTE — PROGRESS NOTES
NURSING ASSESSMENT     Date: 2023        Patient Name: Apollo Barbour     YOB: 1955      Age:  79 y.o. MRN: [id number]       Chaperone [] Yes   [x] No      Advance Directives:  Do you currently have completed advance directives (living will)? [] Yes   [x] No         *If yes, please bring us a copy for your records. *If no, would you like info or assistance in completing advance directives (living will)? [x] Yes   [] No    Pain Score:   Pain Score (1-10): 0      Is pain affecting your ability to take care of yourself or move throughout your home?                         [] Yes   [x] No    General: Weight Loss and Fatigue  Patient has gained weight [] Yes   [x] No  Patient has lost weight [x] Yes   [] No  How much weight in pounds and over what length of time: has lost 10 lb over course of chemo    Eyes (Ophthalmic): wears reading glasses     Skin (Dermatological): psoriasis has cleared a lot with chemo     ENT: has jaw cramping with bites of food     Respiratory: smokes 1/2 ppd for 50 years     Cardiovascular: history of MI with stent, infusaport left chest      Device   [] Yes   [x] No   Copy of Card Obtained [] Yes   [x] No    Gastrointestinal: eating small frequent amts, has intermittent nausea relieved with marijuana or compazine, denies pain with bowel movements, stools are thin, has intermittent blood and mucous with stools    Genito-Urinary: No problems       Breast: No Problems     Musculoskeletal: No Problems    Neurological: 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

## 2024-01-05 PROBLEM — C20 RECTAL CANCER (HCC): Status: ACTIVE | Noted: 2024-01-05

## 2024-01-05 NOTE — CONSULTS
[facility]           Radiation Oncology      [address] [place] [id number]        O: [phone]        F: [phone]       [facility]                   Dr. Jefry Crum MD PhD    CONSULT NOTE     Date of Service: 2023  Patient ID: America Harmon   : 1955  MRN: [id number]   Acct Number: [id number]       America Harmon  68 y.o.   1955    REFERRING PROVIDER: Christine    PCP:  Sheryl, Pcp    DIAGNOSIS:  1. Rectal cancer (HCC)        STAGING: Cancer Staging   Rectal cancer (HCC)  Staging form: Colon And Rectum, AJCC 8th Edition  - Clinical: cT3, cN2, cM0 - Signed by Jefry Crum MD on 2024      HISTORY OF PRESENT ILLNESS: Ms. America Harmon  is a 68 y.o. year old female current smoker with history of MI, cardiac stents, hypertension, A-fib, hysterectomy, cholecystectomy, anal cancer status post definitive chemoradiation therapy completed , and more recent diagnosis of adenocarcinoma of the proximal rectum/sigmoid.     Her oncologic history dates back to 2023 when the patient was noting blood in the stool and was recommended to have screening colonoscopy.  Screening colonoscopy was done in [place] where the patient was living at a time and revealed a high rectal lesion nearly referential in appearance.  Biopsy revealed adenocarcinoma.  On 10/27/2023 the patient underwent a laparoscopic lysis of adhesions with ureteral stent placement and intraoperative colonoscopy with identification of a tumor of the sigmoid colon.  The patient met with medical oncology on 11/10/2023 who recommended staging CT imaging and MRI of the rectum/pelvis to further delineate extent of disease.  On 11/15/2023 CT of the chest with IV contrast revealed a few nonacidic borderline caliber mediastinal and hilar lymph nodes but no parenchymal lesions within the lung.  There was a subcentimeter low-attenuation hepatic lesion in segment 2 of the liver that was

## 2024-01-16 ENCOUNTER — APPOINTMENT (OUTPATIENT)
Dept: CARDIOLOGY | Facility: CLINIC | Age: 69
End: 2024-01-16
Payer: COMMERCIAL

## 2024-02-08 ENCOUNTER — APPOINTMENT (OUTPATIENT)
Dept: CARDIOLOGY | Facility: CLINIC | Age: 69
End: 2024-02-08
Payer: COMMERCIAL

## 2024-02-13 ENCOUNTER — OFFICE VISIT (OUTPATIENT)
Dept: CARDIOLOGY | Facility: CLINIC | Age: 69
End: 2024-02-13
Payer: COMMERCIAL

## 2024-02-13 VITALS
DIASTOLIC BLOOD PRESSURE: 72 MMHG | HEART RATE: 64 BPM | BODY MASS INDEX: 29.41 KG/M2 | WEIGHT: 183 LBS | HEIGHT: 66 IN | SYSTOLIC BLOOD PRESSURE: 132 MMHG

## 2024-02-13 DIAGNOSIS — Z98.61 CAD S/P PERCUTANEOUS CORONARY ANGIOPLASTY: ICD-10-CM

## 2024-02-13 DIAGNOSIS — F17.200 CURRENT EVERY DAY SMOKER: ICD-10-CM

## 2024-02-13 DIAGNOSIS — I25.10 CAD S/P PERCUTANEOUS CORONARY ANGIOPLASTY: ICD-10-CM

## 2024-02-13 DIAGNOSIS — C20 RECTAL CANCER (MULTI): ICD-10-CM

## 2024-02-13 DIAGNOSIS — C20 MALIGNANT NEOPLASM OF RECTAL AMPULLA (MULTI): ICD-10-CM

## 2024-02-13 DIAGNOSIS — E78.2 MIXED HYPERLIPIDEMIA: ICD-10-CM

## 2024-02-13 DIAGNOSIS — I21.9 MYOCARDIAL INFARCTION, UNSPECIFIED MI TYPE, UNSPECIFIED ARTERY (MULTI): ICD-10-CM

## 2024-02-13 DIAGNOSIS — C18.7 CANCER OF SIGMOID COLON (MULTI): ICD-10-CM

## 2024-02-13 PROCEDURE — 3008F BODY MASS INDEX DOCD: CPT | Performed by: INTERNAL MEDICINE

## 2024-02-13 PROCEDURE — 99214 OFFICE O/P EST MOD 30 MIN: CPT | Performed by: INTERNAL MEDICINE

## 2024-02-13 PROCEDURE — 1159F MED LIST DOCD IN RCRD: CPT | Performed by: INTERNAL MEDICINE

## 2024-02-13 PROCEDURE — 1126F AMNT PAIN NOTED NONE PRSNT: CPT | Performed by: INTERNAL MEDICINE

## 2024-02-13 RX ORDER — METOPROLOL SUCCINATE 25 MG/1
25 TABLET, EXTENDED RELEASE ORAL DAILY
Qty: 90 TABLET | Refills: 3 | Status: SHIPPED | OUTPATIENT
Start: 2024-02-13 | End: 2025-02-12

## 2024-02-13 RX ORDER — ROSUVASTATIN CALCIUM 5 MG/1
5 TABLET, COATED ORAL DAILY
Qty: 90 TABLET | Refills: 3 | Status: SHIPPED | OUTPATIENT
Start: 2024-02-13 | End: 2025-02-12

## 2024-02-13 RX ORDER — NAPROXEN SODIUM 220 MG/1
81 TABLET, FILM COATED ORAL DAILY
Qty: 90 TABLET | Refills: 3 | Status: SHIPPED | OUTPATIENT
Start: 2024-02-13 | End: 2025-02-12

## 2024-02-13 RX ORDER — LISINOPRIL 10 MG/1
10 TABLET ORAL 2 TIMES DAILY
Qty: 180 TABLET | Refills: 3 | Status: SHIPPED | OUTPATIENT
Start: 2024-02-13 | End: 2025-02-12

## 2024-02-13 NOTE — PROGRESS NOTES
Referred by Dr. Barrett ref. provider found provider found for   Chief Complaint   Patient presents with    Follow-up     3-4 month follow up        History of Present Illness  Janis Ibanez is a 68 y.o. year old female patient status post previous coronary angioplasty with stent [place].  She has been doing well from a cardiac standpoint no complaint she does have colon cancer and being treated with chemo and probably surgery later.  Did not have any symptoms of chest pain palpitations syncope or presyncope.  She had echocardiogram and stress test done back in October were both normal.  I discussed with the patient in length that she can stop her Plavix and continue baby aspirin.  She will be a reasonable candidate for surgery in the future.  She will call for any problem and follow-up as scheduled    Past Medical History  Past Medical History:   Diagnosis Date    Cholelithiasis     High cholesterol     History of heart attack 2015    History of rectal or anal cancer     20 yrs ago    Hypertension     Presence of dental prosthetic device (complete) (partial)     upper and lower partail    Psoriasis     Wears glasses        Social History  Social History     Tobacco Use    Smoking status: Every Day     Packs/day: 0.25     Years: 50.00     Additional pack years: 0.00     Total pack years: 12.50     Types: Cigarettes     Start date: 1972    Smokeless tobacco: Never   Vaping Use    Vaping Use: Never used   Substance Use Topics    Alcohol use: Yes     Comment: rare, 1-2x a year    Drug use: Never       Family History     Family History   Problem Relation Name Age of Onset    Hypertension Mother      Heart disease Mother      Other (cardiac stents) Mother      Hypertension Father      Heart attack Father      Prostate cancer Father         Review of Systems  As per HPI, all other systems reviewed and negative.    Allergies:  No Known Allergies     Outpatient Medications:  Current Outpatient Medications   Medication  Instructions    clobetasol (Temovate) 0.05 % cream 1 Application, Topical, 2 times daily PRN    docusate sodium (COLACE) 100 mg, oral, 2 times daily    lidocaine (Xylocaine) 5 % ointment Topical, As needed    lisinopril 10 mg, oral, 2 times daily    metoprolol succinate XL (TOPROL-XL) 25 mg, oral, Daily, Do not crush or chew.    prochlorperazine (COMPAZINE) 5 mg, oral, Every 6 hours PRN    rosuvastatin (CRESTOR) 5 mg, oral, Daily         Vitals:  Vitals:    02/13/24 1047   BP: 148/70   Pulse: 64       Physical Exam:  Physical Exam  Vitals and nursing note reviewed.   Constitutional:       Appearance: Normal appearance. She is normal weight.   HENT:      Head: Normocephalic and atraumatic.   Eyes:      Extraocular Movements: Extraocular movements intact.      Pupils: Pupils are equal, round, and reactive to light.   Cardiovascular:      Rate and Rhythm: Normal rate and regular rhythm.      Pulses: Normal pulses.   Pulmonary:      Effort: Pulmonary effort is normal.      Breath sounds: Normal breath sounds.   Musculoskeletal:      Cervical back: Normal range of motion.      Right lower leg: No edema.      Left lower leg: No edema.   Skin:     General: Skin is warm and dry.   Neurological:      General: No focal deficit present.      Mental Status: She is alert and oriented to person, place, and time.             Assessment/Plan   Diagnoses and all orders for this visit:  CAD S/P percutaneous coronary angioplasty  Myocardial infarction, unspecified MI type, unspecified artery (CMS/HCC)  Mixed hyperlipidemia  Cancer of sigmoid colon (CMS/HCC)  Malignant neoplasm of rectal ampulla (CMS/HCC)  Rectal cancer (CMS/HCC)  Current every day smoker  BMI 29.0-29.9,adult          Kolton Lam MD [facility]  Interventional Cardiology   [facility]     Thank you for allowing me to participate in the care of this patient. Please do not hesitate to contact me with any further questions or concerns.

## 2024-02-13 NOTE — PATIENT INSTRUCTIONS
Patient to follow up in 9 months with Dr. Kolton Lam MD [facility]     Call us when the colon surgery is scheduled so we can officially clear you.   This will not be a problem.     OK to STOP Clopidogrel (Plavix) moving forward.   Please START Aspirin 81mg once daily.     No other changes today.   Continue same medications and treatments.   Patient educated on proper medication use.   Patient educated on risk factor modification.   Please bring any lab results from other providers / physicians to your next appointment.     Please bring all medicines, vitamins, and herbal supplements with you when you come to the office.     Prescriptions will not be filled unless you are compliant with your follow up appointments or have a follow up appointment scheduled as per instruction of your physician. Refills should be requested at the time of your visit.    IRajan RN am scribing for and in the presence of Dr. Kolton Lam MD [facility]

## 2024-02-19 ASSESSMENT — ENCOUNTER SYMPTOMS
DIFFICULTY URINATING: 0
DIARRHEA: 0
VOMITING: 0
SHORTNESS OF BREATH: 0
DYSURIA: 0
COUGH: 0
CHEST TIGHTNESS: 0
UNEXPECTED WEIGHT CHANGE: 0
ACTIVITY CHANGE: 0
FEVER: 0
CHILLS: 0
COLOR CHANGE: 0

## 2024-02-19 NOTE — PROGRESS NOTES
FRANCINE Ibanez is a 68 y.o. female with a history of anal cancer 20 years ago for which she received chemo and radiation. She was scheduled for sigmoid resection with Dr. Stover for a rectosigmoid mass with biopsy proven adenocarcinoma in [place]. MMR intact. (No records of this available in Epic). On 10/27 and underwent lap THERON with ureteral stent insertion. The procedure was stopped due to bulky disease.    CT chest and MRI rectum were performed since last visit. She was seen by oncology and had a port placed on 11/21/23. Presented on MDTB 11/29/23. She has started chemotherapy; follows with Dr. Mayes.  Patient is due to finish chemotherapy on Monday.     She is moving her bowels 2-3 times daily. Stools are brown and formed. No hematochezia or melena. Appetite decreased due to foods tasting different from chemo. She is off the plavix and is just taking 81mg aspirin. Weight is stable.     CEA 10/2023: 20.7    CT a/p 9/27/23 ([place]): Thickening of the distal sigmoid/rectal region which could be the area of clinical concern in patient with history of sigmoid cancer. There is no adenopathy or evidence of pelvic lymph node enlargement. There are a few small indeterminant lesions in the periphery of the right lobe of the liver which are not adequately evaluated. Further evaluation with MRI of the liver or PET/CT recommended.     CT chest 11/15/23: Nonspecific 5 mm left lower lobe nodule and borderline enlarged intrathoracic lymph nodes. Suggest follow-up in 6 months. Stable subcentimeter left hepatic lesion since 09/27/2023, too small to definitively characterize. Attention on follow-up examination.    MRI rectum 11/24/23: Rectal mass as detailed above is consistent with rectal neoplasm.  MRI based staging is: The MR imaging based stage of this rectal cancer is T4 with involvement of mesorectal fascia and posterior peritoneal reflection which demonstrates irregularity and enhancement There are multiple  subcentimeter presacral and mesorectal lymph nodes measuring up to 7 mm, which are suspicious for michelle metastasis. However given patient's history of prior radiation therapy, reactive lymph nodes are in additional consideration.    Cardiac stress test 10/20/23: Normal Lexiscan Myoview cardiac perfusion stress test. No evidence of ischemia or myocardial infarction by perfusion imaging. Normal left ventricular systolic function, ejection fraction 68%. None invasive risk stratification is low risk.    Colonoscopy 9/8/23 (in [place]): Prep of the colon was poor. Hemorrhoids found on perianal exam. Stool in the rectum and in the recto-sigmoid colon. Rule out malignancy, completely obstructing tumor in the recto-sigmoid colon. Biopsied. Tattooed. Path demonstrating invasive adenocarcinoma. MMR intact.     Current smoker- 6 cigarettes per day/No ETOH/No Illicit drug use  No family history of CRC or IBD  PMH: MI, coronary stents about 10 years ago   PSH: open cholecystectomy, open appendectomy, open hysterectomy, vascular surgery on right groin for blockage    Past Medical History:   Diagnosis Date    Cholelithiasis     High cholesterol     History of heart attack 2015    History of rectal or anal cancer     20 yrs ago    Hypertension     Presence of dental prosthetic device (complete) (partial)     upper and lower partail    Psoriasis     Wears glasses        Past Surgical History:   Procedure Laterality Date    ANUS SURGERY      APPENDECTOMY      CHOLECYSTECTOMY      COLONOSCOPY      CORONARY ANGIOPLASTY WITH STENT PLACEMENT      FLEXIBLE SIGMOIDOSCOPY      HYSTERECTOMY      PELVIC LAPAROSCOPY         No Known Allergies    Review of Systems   Constitutional:  Positive for appetite change and fatigue. Negative for activity change, chills, diaphoresis, fever and unexpected weight change.   Respiratory:  Negative for cough, chest tightness and shortness of breath.    Cardiovascular:  Negative for chest pain, palpitations  and leg swelling.   Gastrointestinal:  Positive for nausea (the week of her chemotherapy). Negative for abdominal distention, abdominal pain, anal bleeding, blood in stool, constipation, diarrhea, rectal pain and vomiting.   Genitourinary:  Negative for difficulty urinating, dysuria, frequency and pelvic pain.   Skin:  Negative for color change.   Neurological:  Negative for dizziness and light-headedness.   All other systems reviewed and are negative.      Physical Exam  Constitutional:       Appearance: Normal appearance.   HENT:      Head: Normocephalic.   Eyes:      Pupils: Pupils are equal, round, and reactive to light.   Cardiovascular:      Rate and Rhythm: Normal rate and regular rhythm.      Pulses: Normal pulses.      Heart sounds: Normal heart sounds.   Pulmonary:      Effort: Pulmonary effort is normal.      Breath sounds: Normal breath sounds.   Abdominal:      General: There is no distension.      Palpations: Abdomen is soft. There is no mass.      Tenderness: There is no abdominal tenderness.      Hernia: No hernia is present.      Comments: 3 right sided laparoscopic incisional scars, 1 supra-umbilical midline incisional scar, 1 left wilfredo-abdomen incisional scar; old well-healed right groin scar.     Musculoskeletal:         General: Normal range of motion.      Cervical back: Normal range of motion.   Skin:     General: Skin is warm and dry.      Capillary Refill: Capillary refill takes less than 2 seconds.   Neurological:      General: No focal deficit present.      Mental Status: She is alert and oriented to person, place, and time. Mental status is at baseline.   Psychiatric:         Mood and Affect: Mood normal.         Behavior: Behavior normal.         Thought Content: Thought content normal.         Judgment: Judgment normal.     No neck or inguinal LAD    Assessment and Plan:   #Upper rectal adenocarcinoma, MMR intact per review of OSH pathology report, S/P diagnostic laparoscopy with some  colonic mobilization and THERON at [facility] 10/27/2023 with aborted attempt at oncologic resection followed by chemotherapy (FOLFOX), final 8th cycle 3/4/2024  -  Will restage approximately 4 weeks out from completion  -  Follow-up in office after completion of above with plan for in-office flexible sigmoidoscopy    #Remote hx anal Ca S/P primary resection and CRT  -  Will likely require proximal diversion for any primary anastomosis    #CAD s/p remote PCI w/ stent insertion  -  Was cleared by cardiology prior to diagnostic laparoscopy procedure  -  Currently on 81mg ASA instead of plavix    #Smoker  -  Down to 1/3 PPD  -  Encouraged to continue working on smoking cessation    Gustavo Harp MD   2/28/2024  8:53 AM

## 2024-02-21 ENCOUNTER — APPOINTMENT (OUTPATIENT)
Dept: SURGERY | Facility: CLINIC | Age: 69
End: 2024-02-21
Payer: COMMERCIAL

## 2024-02-28 ENCOUNTER — OFFICE VISIT (OUTPATIENT)
Dept: SURGERY | Facility: CLINIC | Age: 69
End: 2024-02-28
Payer: COMMERCIAL

## 2024-02-28 VITALS
BODY MASS INDEX: 29.41 KG/M2 | TEMPERATURE: 96.5 F | WEIGHT: 183 LBS | HEART RATE: 70 BPM | HEIGHT: 66 IN | DIASTOLIC BLOOD PRESSURE: 75 MMHG | SYSTOLIC BLOOD PRESSURE: 123 MMHG

## 2024-02-28 DIAGNOSIS — Z85.048 HISTORY OF ANAL CANCER: ICD-10-CM

## 2024-02-28 DIAGNOSIS — C20 RECTAL CANCER (MULTI): ICD-10-CM

## 2024-02-28 PROCEDURE — 99214 OFFICE O/P EST MOD 30 MIN: CPT | Performed by: STUDENT IN AN ORGANIZED HEALTH CARE EDUCATION/TRAINING PROGRAM

## 2024-02-28 PROCEDURE — 3008F BODY MASS INDEX DOCD: CPT | Performed by: STUDENT IN AN ORGANIZED HEALTH CARE EDUCATION/TRAINING PROGRAM

## 2024-02-28 PROCEDURE — 1126F AMNT PAIN NOTED NONE PRSNT: CPT | Performed by: STUDENT IN AN ORGANIZED HEALTH CARE EDUCATION/TRAINING PROGRAM

## 2024-02-28 PROCEDURE — 1159F MED LIST DOCD IN RCRD: CPT | Performed by: STUDENT IN AN ORGANIZED HEALTH CARE EDUCATION/TRAINING PROGRAM

## 2024-02-28 ASSESSMENT — ENCOUNTER SYMPTOMS
ABDOMINAL DISTENTION: 0
PALPITATIONS: 0
LIGHT-HEADEDNESS: 0
FREQUENCY: 0
FATIGUE: 1
APPETITE CHANGE: 1
BLOOD IN STOOL: 0
DIAPHORESIS: 0
DIZZINESS: 0
RECTAL PAIN: 0
NAUSEA: 1
CONSTIPATION: 0
ANAL BLEEDING: 0
ABDOMINAL PAIN: 0

## 2024-02-29 ENCOUNTER — TELEPHONE (OUTPATIENT)
Dept: CARDIOLOGY | Facility: CLINIC | Age: 69
End: 2024-02-29
Payer: COMMERCIAL

## 2024-02-29 NOTE — TELEPHONE ENCOUNTER
Patient called and left a voicemail asking if her prescriptions had been sent to [facility] in [place] on [address]. I returned her call and left a voicemail that they were sent on 2/14/24.

## 2024-03-28 ENCOUNTER — LAB (OUTPATIENT)
Dept: LAB | Facility: LAB | Age: 69
End: 2024-03-28
Payer: COMMERCIAL

## 2024-03-28 DIAGNOSIS — C20 RECTAL CANCER (MULTI): ICD-10-CM

## 2024-03-28 LAB
ALBUMIN SERPL BCP-MCNC: 3.8 G/DL (ref 3.4–5)
ANION GAP SERPL CALC-SCNC: 13 MMOL/L (ref 10–20)
BUN SERPL-MCNC: 9 MG/DL (ref 6–23)
CALCIUM SERPL-MCNC: 9.8 MG/DL (ref 8.6–10.6)
CEA SERPL-MCNC: 2.3 UG/L
CHLORIDE SERPL-SCNC: 105 MMOL/L (ref 98–107)
CO2 SERPL-SCNC: 25 MMOL/L (ref 21–32)
CREAT SERPL-MCNC: 0.85 MG/DL (ref 0.5–1.05)
EGFRCR SERPLBLD CKD-EPI 2021: 76 ML/MIN/1.73M*2
GLUCOSE SERPL-MCNC: 125 MG/DL (ref 74–99)
PHOSPHATE SERPL-MCNC: 2.7 MG/DL (ref 2.5–4.9)
POTASSIUM SERPL-SCNC: 4.3 MMOL/L (ref 3.5–5.3)
SODIUM SERPL-SCNC: 139 MMOL/L (ref 136–145)

## 2024-03-28 PROCEDURE — 36415 COLL VENOUS BLD VENIPUNCTURE: CPT

## 2024-03-28 PROCEDURE — 80069 RENAL FUNCTION PANEL: CPT

## 2024-03-28 PROCEDURE — 82378 CARCINOEMBRYONIC ANTIGEN: CPT

## 2024-04-02 ENCOUNTER — HOSPITAL ENCOUNTER (OUTPATIENT)
Dept: RADIOLOGY | Facility: HOSPITAL | Age: 69
Discharge: HOME | End: 2024-04-02
Payer: COMMERCIAL

## 2024-04-02 DIAGNOSIS — C20 RECTAL CANCER (MULTI): ICD-10-CM

## 2024-04-02 PROCEDURE — 2500000004 HC RX 250 GENERAL PHARMACY W/ HCPCS (ALT 636 FOR OP/ED): Mod: JZ | Performed by: RADIOLOGY

## 2024-04-02 PROCEDURE — 2550000001 HC RX 255 CONTRASTS: Performed by: STUDENT IN AN ORGANIZED HEALTH CARE EDUCATION/TRAINING PROGRAM

## 2024-04-02 PROCEDURE — 72197 MRI PELVIS W/O & W/DYE: CPT | Performed by: RADIOLOGY

## 2024-04-02 PROCEDURE — 72197 MRI PELVIS W/O & W/DYE: CPT

## 2024-04-02 PROCEDURE — A9575 INJ GADOTERATE MEGLUMI 0.1ML: HCPCS | Performed by: STUDENT IN AN ORGANIZED HEALTH CARE EDUCATION/TRAINING PROGRAM

## 2024-04-02 RX ORDER — GADOTERATE MEGLUMINE 376.9 MG/ML
16 INJECTION INTRAVENOUS
Status: COMPLETED | OUTPATIENT
Start: 2024-04-02 | End: 2024-04-02

## 2024-04-02 RX ADMIN — GLUCAGON HYDROCHLORIDE 1 MG: 1 INJECTION, POWDER, FOR SOLUTION INTRAMUSCULAR; INTRAVENOUS; SUBCUTANEOUS at 09:15

## 2024-04-02 RX ADMIN — GADOTERATE MEGLUMINE 16 ML: 376.9 INJECTION INTRAVENOUS at 10:10

## 2024-04-03 ENCOUNTER — APPOINTMENT (OUTPATIENT)
Dept: RADIOLOGY | Facility: CLINIC | Age: 69
End: 2024-04-03
Payer: COMMERCIAL

## 2024-04-04 ENCOUNTER — HOSPITAL ENCOUNTER (OUTPATIENT)
Dept: RADIOLOGY | Facility: CLINIC | Age: 69
Discharge: HOME | End: 2024-04-04
Payer: COMMERCIAL

## 2024-04-04 DIAGNOSIS — C20 RECTAL CANCER (MULTI): ICD-10-CM

## 2024-04-04 PROCEDURE — 74177 CT ABD & PELVIS W/CONTRAST: CPT | Performed by: RADIOLOGY

## 2024-04-04 PROCEDURE — 2550000001 HC RX 255 CONTRASTS: Performed by: STUDENT IN AN ORGANIZED HEALTH CARE EDUCATION/TRAINING PROGRAM

## 2024-04-04 PROCEDURE — 74177 CT ABD & PELVIS W/CONTRAST: CPT

## 2024-04-04 PROCEDURE — 71260 CT THORAX DX C+: CPT | Performed by: RADIOLOGY

## 2024-04-04 RX ADMIN — IOHEXOL 70 ML: 350 INJECTION, SOLUTION INTRAVENOUS at 10:06

## 2024-04-11 ASSESSMENT — ENCOUNTER SYMPTOMS
DYSURIA: 0
COLOR CHANGE: 0
FEVER: 0
COUGH: 0
ABDOMINAL DISTENTION: 0
RECTAL PAIN: 0
BLOOD IN STOOL: 0
PALPITATIONS: 0
ABDOMINAL PAIN: 0
UNEXPECTED WEIGHT CHANGE: 0
CHILLS: 0
DIFFICULTY URINATING: 0
DIZZINESS: 0
DIAPHORESIS: 0
LIGHT-HEADEDNESS: 0
CHEST TIGHTNESS: 0
ACTIVITY CHANGE: 0
CONSTIPATION: 0
FREQUENCY: 0
DIARRHEA: 0
ANAL BLEEDING: 0
VOMITING: 0
SHORTNESS OF BREATH: 0

## 2024-04-11 NOTE — PROGRESS NOTES
FRANCINE Ibanez is a 68 y.o. female with a history of anal cancer 20 years ago for which she received chemo and radiation. She was scheduled for sigmoid resection with Dr. Olivares for a rectosigmoid mass with biopsy proven adenocarcinoma in [place]. MMR intact. (No records of this available in Epic). On 10/27 she underwent lap THERON with ureteral stent insertion. The procedure was stopped due to bulky disease.    CT chest and MRI rectum were performed since last visit. She was seen by oncology and had a port placed on 11/21/23. Presented on [facility]TB 11/29/23. She has started chemotherapy; follows with Dr. Mayes.  Patient completed chemotherapy last month. She presents today for a flex sig.    She feels good overall. No abdominal pain. She says her appetite is almost normal. No n/v/f/c. She has gained energy since completion of chemo. She has small bowel movements 5 times a day. No hematochezia or melena. No dysuria or hematuria. She is trying to lose weight by healthier eating.    CEA 10/2023: 20.7; 2/19/24: 5.4; 3/28/24: 2.3    CT c/a/p 4/5/24: Left lower lobe stable 5 mm nodule as well as a right lower lobe stable 2 mm nodule, more likely benign. No significant new or enlarging pulmonary nodule.  - Nonspecific mildly prominent pretracheal lymph node measuring 1 cm in short axis unchanged. No new thoracic lymphadenopathy.   - Some irregular rectal wall thickening which may be related to patient's known neoplasm. Please see report of recent MRI rectum 04/02/2024 for more detail.  - Distal colonic diverticulosis without focal acute diverticulitis.  - Nonspecific mild wall prominence of the mid-distal sigmoid colon may be partially exaggerated by underdistention.  - No bowel obstruction.   - Small nonspecific retroperitoneal lymph nodes which are not individually pathologically enlarged.  - Nonspecific mild fat stranding of the retroperitoneum. No retroperitoneal fluid collection.  - Subcentimeter low-attenuation  lesion in the dome of the left hepatic lobe too small to characterize but unchanged from prior, more likely benign. No new focal hepatic lesion.    MRI rectum 4/2/24: Favorable treatment response.  The previous upper-mid rectal mass has significantly regressed, however there is still small focus of restricted diffusion in the mass posteriorly which could reflect residual tumor. Previous involvement of the mesorectal fascia and peritoneal reflection is no longer visualized, although there appear to be posttreatment related fibrosis at this site. The previously enlarged presacral lymph nodes have regressed in size, the dominant nodes from 7 mm short axis to 3mm. No current lymphadenopathy identified.    Cardiac stress test 10/20/23: Normal Lexiscan Myoview cardiac perfusion stress test. No evidence of ischemia or myocardial infarction by perfusion imaging. Normal left ventricular systolic function, ejection fraction 68%. None invasive risk stratification is low risk.    Colonoscopy 9/8/23 (in [place]): Prep of the colon was poor. Hemorrhoids found on perianal exam. Stool in the rectum and in the recto-sigmoid colon. Rule out malignancy, completely obstructing tumor in the recto-sigmoid colon. Biopsied. Tattooed. Path demonstrating invasive adenocarcinoma. MMR intact.     Current smoker- 1/4 ppd/No ETOH/No Illicit drug use  No family history of CRC or IBD  PMH: MI, coronary stents about 10 years ago, HTN, Anal cancer, A.fib  PSH: open cholecystectomy, open appendectomy, open hysterectomy, vascular surgery on right groin for blockage  Employment: Retired  Past Medical History:   Diagnosis Date    Cholelithiasis     High cholesterol     History of heart attack 2015    History of rectal or anal cancer     20 yrs ago    Hypertension     Presence of dental prosthetic device (complete) (partial)     upper and lower partail    Psoriasis     Wears glasses        Past Surgical History:   Procedure Laterality Date    ANUS  SURGERY      APPENDECTOMY      CHOLECYSTECTOMY      COLONOSCOPY      CORONARY ANGIOPLASTY WITH STENT PLACEMENT      FLEXIBLE SIGMOIDOSCOPY      HYSTERECTOMY      PELVIC LAPAROSCOPY         No Known Allergies    Review of Systems   Constitutional:  Negative for activity change, appetite change, chills, diaphoresis, fatigue, fever and unexpected weight change.   Respiratory:  Negative for cough, chest tightness and shortness of breath.    Cardiovascular:  Negative for chest pain, palpitations and leg swelling.   Gastrointestinal:  Negative for abdominal distention, abdominal pain, anal bleeding, blood in stool, constipation, diarrhea, nausea (the week of her chemotherapy), rectal pain and vomiting.   Endocrine: Negative for polydipsia, polyphagia and polyuria.   Genitourinary:  Negative for difficulty urinating, dysuria, frequency, hematuria and pelvic pain.   Skin:  Negative for color change.   Neurological:  Negative for dizziness, seizures and light-headedness.   Hematological: Negative.    All other systems reviewed and are negative.      Physical Exam  Constitutional:       General: She is not in acute distress.     Appearance: Normal appearance. She is not ill-appearing.   HENT:      Head: Normocephalic.      Mouth/Throat:      Mouth: Mucous membranes are moist.   Eyes:      Extraocular Movements: Extraocular movements intact.      Pupils: Pupils are equal, round, and reactive to light.   Cardiovascular:      Rate and Rhythm: Normal rate and regular rhythm.      Pulses: Normal pulses.      Heart sounds: Normal heart sounds. No murmur heard.  Pulmonary:      Effort: Pulmonary effort is normal. No respiratory distress.      Breath sounds: Normal breath sounds. No wheezing, rhonchi or rales.   Chest:      Chest wall: No tenderness.   Abdominal:      General: There is no distension.      Palpations: Abdomen is soft. There is no mass.      Tenderness: There is no abdominal tenderness. There is no guarding or rebound.       Hernia: No hernia is present.      Comments: Well healed right wilfredo-abdomen incisional and Pfannenstiel incisional scars.  Well healed small periumbilical incisional scar.   Musculoskeletal:         General: No swelling or deformity. Normal range of motion.      Cervical back: Normal range of motion and neck supple. No rigidity.      Right lower leg: No edema.      Left lower leg: No edema.   Skin:     General: Skin is warm and dry.      Capillary Refill: Capillary refill takes less than 2 seconds.      Coloration: Skin is not jaundiced or pale.   Neurological:      General: No focal deficit present.      Mental Status: She is alert and oriented to person, place, and time. Mental status is at baseline.   Psychiatric:         Mood and Affect: Mood normal.         Behavior: Behavior normal.         Thought Content: Thought content normal.         Judgment: Judgment normal.     No neck/cervical/inguinal LAD    Flexible sigmoidoscopy  -  Upper rectal lesion at 11cm from anal verge  -  Lesion is circumferential and causes narrowing of the lumen  -  There is a scar posteriorly and immediately distal to the lesion  -  There appears to be tattoo marking diffusely in the rectum  -  There is a lesion at the anorectal ring in the left lateral position which is soft, palpable, biopsied today with cold forceps      Assessment and Plan:   #Upper rectal adenocarcinoma, MMR intact per review of OSH pathology report, S/P diagnostic laparoscopy with some colonic mobilization and THERON at [facility] 10/27/2023 with aborted attempt at oncologic resection followed by chemotherapy (FOLFOX), final 8th cycle 3/4/2024  -  Flexible sigmoidoscopy completed today in office  -  Resubmit for MDTB discussion    #Remote hx anal Ca S/P primary resection and CRT  -  Lesion in left lateral position at anorectal ring, biopsied today, ? Recurrence  -  If there is recurrence, will need APR  -  If no recurrence, will require proximal diversion for any  primary anastomosis    #CAD s/p remote PCI w/ stent insertion  -  Was cleared by cardiology prior to diagnostic laparoscopy procedure  -  Currently on 81mg ASA instead of plavix    #Smoker  -  Counseled regarding smoking cessation    Gustavo Harp MD   4/17/2024  10:01 AM

## 2024-04-17 ENCOUNTER — OFFICE VISIT (OUTPATIENT)
Dept: SURGERY | Facility: CLINIC | Age: 69
End: 2024-04-17
Payer: COMMERCIAL

## 2024-04-17 VITALS
BODY MASS INDEX: 29.38 KG/M2 | SYSTOLIC BLOOD PRESSURE: 133 MMHG | DIASTOLIC BLOOD PRESSURE: 76 MMHG | TEMPERATURE: 98.2 F | WEIGHT: 182 LBS | HEART RATE: 70 BPM

## 2024-04-17 DIAGNOSIS — I25.10 CAD S/P PERCUTANEOUS CORONARY ANGIOPLASTY: ICD-10-CM

## 2024-04-17 DIAGNOSIS — Z85.048 HISTORY OF ANAL CANCER: Primary | ICD-10-CM

## 2024-04-17 DIAGNOSIS — C20 RECTAL CANCER (MULTI): ICD-10-CM

## 2024-04-17 DIAGNOSIS — Z98.61 CAD S/P PERCUTANEOUS CORONARY ANGIOPLASTY: ICD-10-CM

## 2024-04-17 DIAGNOSIS — F17.200 CURRENT EVERY DAY SMOKER: ICD-10-CM

## 2024-04-17 PROCEDURE — 99215 OFFICE O/P EST HI 40 MIN: CPT | Performed by: STUDENT IN AN ORGANIZED HEALTH CARE EDUCATION/TRAINING PROGRAM

## 2024-04-17 PROCEDURE — 88305 TISSUE EXAM BY PATHOLOGIST: CPT | Performed by: PATHOLOGY

## 2024-04-17 PROCEDURE — 99215 OFFICE O/P EST HI 40 MIN: CPT | Mod: 25 | Performed by: STUDENT IN AN ORGANIZED HEALTH CARE EDUCATION/TRAINING PROGRAM

## 2024-04-17 PROCEDURE — 3008F BODY MASS INDEX DOCD: CPT | Performed by: STUDENT IN AN ORGANIZED HEALTH CARE EDUCATION/TRAINING PROGRAM

## 2024-04-17 PROCEDURE — 45330 DIAGNOSTIC SIGMOIDOSCOPY: CPT | Performed by: STUDENT IN AN ORGANIZED HEALTH CARE EDUCATION/TRAINING PROGRAM

## 2024-04-17 PROCEDURE — 88305 TISSUE EXAM BY PATHOLOGIST: CPT | Mod: TC,SUR | Performed by: STUDENT IN AN ORGANIZED HEALTH CARE EDUCATION/TRAINING PROGRAM

## 2024-04-17 PROCEDURE — 1159F MED LIST DOCD IN RCRD: CPT | Performed by: STUDENT IN AN ORGANIZED HEALTH CARE EDUCATION/TRAINING PROGRAM

## 2024-04-17 PROCEDURE — 1126F AMNT PAIN NOTED NONE PRSNT: CPT | Performed by: STUDENT IN AN ORGANIZED HEALTH CARE EDUCATION/TRAINING PROGRAM

## 2024-04-17 ASSESSMENT — ENCOUNTER SYMPTOMS
HEMATOLOGIC/LYMPHATIC NEGATIVE: 1
HEMATURIA: 0
FATIGUE: 0
POLYDIPSIA: 0
POLYPHAGIA: 0
SEIZURES: 0
APPETITE CHANGE: 0
NAUSEA: 0

## 2024-04-17 ASSESSMENT — PAIN SCALES - GENERAL: PAINLEVEL: 0-NO PAIN

## 2024-04-22 ASSESSMENT — ENCOUNTER SYMPTOMS
COLOR CHANGE: 0
UNEXPECTED WEIGHT CHANGE: 0
RECTAL PAIN: 0
SEIZURES: 0
DYSURIA: 0
ABDOMINAL DISTENTION: 0
CHILLS: 0
COUGH: 0
CONSTIPATION: 0
FATIGUE: 0
BLOOD IN STOOL: 0
POLYDIPSIA: 0
ABDOMINAL PAIN: 0
ACTIVITY CHANGE: 0
CHEST TIGHTNESS: 0
ANAL BLEEDING: 0
DIAPHORESIS: 0
FREQUENCY: 0
PALPITATIONS: 0
DIZZINESS: 0
LIGHT-HEADEDNESS: 0
DIARRHEA: 0
VOMITING: 0
NAUSEA: 0
SHORTNESS OF BREATH: 0
HEMATURIA: 0
FEVER: 0
HEMATOLOGIC/LYMPHATIC NEGATIVE: 1
APPETITE CHANGE: 0
DIFFICULTY URINATING: 0
POLYPHAGIA: 0

## 2024-04-22 NOTE — TUMOR BOARD NOTE
MULTIDISCIPLINARY RECTAL TUMOR BOARD CONFERENCE NOTE  Janis Ibanez was presented at Rectal Tumor Board Conference  Conference date: 4/24/2024  Presenting Provider(s): Dr. Gustavo Harp  Present at Conference: Medical Oncology, Radiation Oncology, Surgical Oncology, Radiology, and Pathology Representatives  Conference Review Type: Radiology Review and Pathology Review     Impression:  Janis Ibanez is a 68 y.o. female patient who presents with history of remote anal cancer 20 years ago. She was scheduled for sigmoid resection with Dr. Olivares for a rectosigmoid mass with biopsy proven adenocarcinoma in [place]. S/p neoadjuvant chemotherapy as of last month.     (04/2024) MR Rectum - The previous upper-mid rectal mass has significantly regressed, however there is still small focus of restricted diffusion in the mass posteriorly which could reflect residual tumor. Previous involvement of the mesorectal fascia and peritoneal reflection is no longer visualized, although there appear to be posttreatment related fibrosis at this site. The previously enlarged presacral lymph nodes have regressed in size, the dominant nodes from 7 mm short axis to 3 mm. No current lymphadenopathy identified.    (04/2024) CT CAP - No suspicious findings.    Mismatch Repair Status:  Intact    National Guidelines discussed: Yes  Recommendations: Proceed with low anterior resection.  Surgical Resection: Yes        Cancer Staging:  Cancer Staging   No matching staging information was found for the patient.       Disclaimer  [facility] tumor board recommendations represent the consensus opinion of physicians present at a weekly patient care conference. The treating [facility] physician is not always present, and many of the physicians formulating the recommendation have not personally seen or examined the patient under discussion. It is understood that the treating [facility] physician considers the expertise of the Tumor Board Recommendation in formulating  his/her plan for the patient. However, in many situations, based on individualized patient considerations, a different plan is determined by the treating physician to be the optimal medical management.    Scribe Attestation  By signing my name below, I, Joe Manuele   attest that this documentation has been prepared under the direction and in the presence of RECTAL TUMOR BOARD.

## 2024-04-22 NOTE — PROGRESS NOTES
FRANCINE Ibanez is a 68 y.o. female with a history of anal cancer 20 years ago for which she received chemo and radiation. She was scheduled for sigmoid resection with Dr. Olivares for a rectosigmoid mass with biopsy proven adenocarcinoma in [place]. MMR intact. (No records of this available in Epic). On 10/27 she underwent lap THERON with ureteral stent insertion. The procedure was stopped due to bulky disease.    Imaging was performed since last visit. She was seen by oncology and had a port placed on 11/21/23. Presented on MDTB 11/29/23. She has started chemotherapy; follows with Dr. Mayes.  She completed chemotherapy last month. She was last seen 4/17 where a flex sig was conducted with biopsy. She was resubmitted to  and their recommendations were surgery. She presents today to discuss.     She overall feels good. No c/o n/v/f/c. Normal appetite. She sometimes has episodes of incontinence.     CEA 10/2023: 20.7; 2/19/24: 5.4; 3/28/24: 2.3    CT c/a/p 4/5/24: Left lower lobe stable 5 mm nodule as well as a right lower lobe stable 2 mm nodule, more likely benign. No significant new or enlarging pulmonary nodule.  - Nonspecific mildly prominent pretracheal lymph node measuring 1 cm in short axis unchanged. No new thoracic lymphadenopathy.   - Some irregular rectal wall thickening which may be related to patient's known neoplasm. Please see report of recent MRI rectum 04/02/2024 for more detail.  - Distal colonic diverticulosis without focal acute diverticulitis.  - Nonspecific mild wall prominence of the mid-distal sigmoid colon may be partially exaggerated by underdistention.  - No bowel obstruction.   - Small nonspecific retroperitoneal lymph nodes which are not individually pathologically enlarged.  - Nonspecific mild fat stranding of the retroperitoneum. No retroperitoneal fluid collection.  - Subcentimeter low-attenuation lesion in the dome of the left hepatic lobe too small to characterize but unchanged  from prior, more likely benign. No new focal hepatic lesion.    MRI rectum 4/2/24: Favorable treatment response.  The previous upper-mid rectal mass has significantly regressed, however there is still small focus of restricted diffusion in the mass posteriorly which could reflect residual tumor. Previous involvement of the mesorectal fascia and peritoneal reflection is no longer visualized, although there appear to be posttreatment related fibrosis at this site. The previously enlarged presacral lymph nodes have regressed in size, the dominant nodes from 7 mm short axis to 3mm. No current lymphadenopathy identified.    Cardiac stress test 10/20/23: Normal Lexiscan Myoview cardiac perfusion stress test. No evidence of ischemia or myocardial infarction by perfusion imaging. Normal left ventricular systolic function, ejection fraction 68%. None invasive risk stratification is low risk.    Colonoscopy 9/8/23 (in [place]): Prep of the colon was poor. Hemorrhoids found on perianal exam. Stool in the rectum and in the recto-sigmoid colon. Rule out malignancy, completely obstructing tumor in the recto-sigmoid colon. Biopsied. Tattooed. Path demonstrating invasive adenocarcinoma. MMR intact.     Current smoker- 1/4 ppd/No ETOH/No Illicit drug use  No family history of CRC or IBD  PMH: MI, coronary stents about 10 years ago, HTN, Anal cancer, A.fib  PSH: open cholecystectomy, open appendectomy, open hysterectomy, vascular surgery on right groin for blockage  Employment: Retired    Past Medical History:   Diagnosis Date    Cholelithiasis     High cholesterol     History of heart attack 2015    History of rectal or anal cancer     20 yrs ago    Hypertension     Presence of dental prosthetic device (complete) (partial)     upper and lower partail    Psoriasis     Wears glasses        Past Surgical History:   Procedure Laterality Date    ANUS SURGERY      APPENDECTOMY      CHOLECYSTECTOMY      COLONOSCOPY      CORONARY  ANGIOPLASTY WITH STENT PLACEMENT      FLEXIBLE SIGMOIDOSCOPY      HYSTERECTOMY      PELVIC LAPAROSCOPY       Current Outpatient Medications   Medication Sig Dispense Refill    aspirin 81 mg chewable tablet Chew 1 tablet (81 mg) once daily. 90 tablet 3    clobetasol (Temovate) 0.05 % cream Apply 1 Application topically 2 times a day as needed (per pateint as needed for psoriasis).      cyanocobalamin, vitamin B-12, (VITAMIN B-12 ORAL) Take by mouth.      docusate sodium (Colace) 100 mg capsule Take 1 capsule (100 mg) by mouth once daily.      lidocaine (Xylocaine) 5 % ointment Apply topically if needed for mild pain (1 - 3).      lisinopril 10 mg tablet Take 1 tablet (10 mg) by mouth 2 times a day. 180 tablet 3    metoprolol succinate XL (Toprol-XL) 25 mg 24 hr tablet Take 1 tablet (25 mg) by mouth once daily. Do not crush or chew. 90 tablet 3    prochlorperazine (Compazine) 5 mg tablet Take 1 tablet (5 mg) by mouth every 6 hours if needed for nausea or vomiting.      rosuvastatin (Crestor) 5 mg tablet Take 1 tablet (5 mg) by mouth once daily. 90 tablet 3     No current facility-administered medications for this visit.      No Known Allergies    Review of Systems   Constitutional:  Negative for activity change, appetite change, chills, diaphoresis, fatigue, fever and unexpected weight change.   Respiratory:  Negative for cough, chest tightness and shortness of breath.    Cardiovascular:  Negative for chest pain, palpitations and leg swelling.   Gastrointestinal:  Negative for abdominal distention, abdominal pain, anal bleeding, blood in stool, constipation, diarrhea, nausea (the week of her chemotherapy), rectal pain and vomiting.   Endocrine: Negative for polydipsia, polyphagia and polyuria.   Genitourinary:  Negative for difficulty urinating, dysuria, frequency, hematuria and pelvic pain.   Skin:  Negative for color change.   Neurological:  Negative for dizziness, seizures and light-headedness.   Hematological:  Negative.    All other systems reviewed and are negative.      Physical Exam  Constitutional:       General: She is not in acute distress.     Appearance: Normal appearance. She is not ill-appearing.   HENT:      Head: Normocephalic.      Mouth/Throat:      Mouth: Mucous membranes are moist.   Eyes:      Extraocular Movements: Extraocular movements intact.      Pupils: Pupils are equal, round, and reactive to light.   Cardiovascular:      Rate and Rhythm: Normal rate and regular rhythm.      Pulses: Normal pulses.      Heart sounds: Normal heart sounds. No murmur heard.  Pulmonary:      Effort: Pulmonary effort is normal. No respiratory distress.      Breath sounds: Normal breath sounds. No wheezing, rhonchi or rales.   Chest:      Chest wall: No tenderness.   Abdominal:      General: There is no distension.      Palpations: Abdomen is soft. There is no mass.      Tenderness: There is no abdominal tenderness. There is no guarding or rebound.      Hernia: No hernia is present.      Comments: Well healed right wilfredo-abdomen incisional and Pfannenstiel incisional scars.  Well healed small periumbilical incisional scar.   Musculoskeletal:         General: No swelling or deformity. Normal range of motion.      Cervical back: Normal range of motion and neck supple. No rigidity.      Right lower leg: No edema.      Left lower leg: No edema.   Skin:     General: Skin is warm and dry.      Capillary Refill: Capillary refill takes less than 2 seconds.      Coloration: Skin is not jaundiced or pale.   Neurological:      General: No focal deficit present.      Mental Status: She is alert and oriented to person, place, and time. Mental status is at baseline.   Psychiatric:         Mood and Affect: Mood normal.         Behavior: Behavior normal.         Thought Content: Thought content normal.         Judgment: Judgment normal.     No neck/cervical/inguinal LAD      Assessment and Plan:   #Upper rectal adenocarcinoma, MMR intact  per review of OSH pathology report, S/P diagnostic laparoscopy with some colonic mobilization and THERON at [facility] 10/27/2023 with aborted attempt at oncologic resection followed by chemotherapy (FOLFOX), final 8th cycle 3/4/2024  -  Robotic-assisted laparoscopic low anterior resection, CRA, flexible sigmoidoscopy, DLI  -  Plan for 5/17  -  Case request submitted  -  PAT and stoma marking  -  Bowel prep day before surgery    #Remote hx anal Ca S/P primary resection and CRT  -  No evidence of recurrence    #CAD s/p remote PCI w/ stent insertion  -  Was cleared by cardiology prior to diagnostic laparoscopy procedure  -  Currently on 81mg ASA instead of plavix  -  OK to continue 81mg ASA perioperatively    Gustavo Harp MD   5/1/2024  9:24 PM

## 2024-04-22 NOTE — H&P (VIEW-ONLY)
FRANCINE Ibanez is a 68 y.o. female with a history of anal cancer 20 years ago for which she received chemo and radiation. She was scheduled for sigmoid resection with Dr. Olivares for a rectosigmoid mass with biopsy proven adenocarcinoma in [place]. MMR intact. (No records of this available in Epic). On 10/27 she underwent lap THERON with ureteral stent insertion. The procedure was stopped due to bulky disease.    Imaging was performed since last visit. She was seen by oncology and had a port placed on 11/21/23. Presented on MDTB 11/29/23. She has started chemotherapy; follows with Dr. Mayes.  She completed chemotherapy last month. She was last seen 4/17 where a flex sig was conducted with biopsy. She was resubmitted to  and their recommendations were surgery. She presents today to discuss.     She overall feels good. No c/o n/v/f/c. Normal appetite. She sometimes has episodes of incontinence.     CEA 10/2023: 20.7; 2/19/24: 5.4; 3/28/24: 2.3    CT c/a/p 4/5/24: Left lower lobe stable 5 mm nodule as well as a right lower lobe stable 2 mm nodule, more likely benign. No significant new or enlarging pulmonary nodule.  - Nonspecific mildly prominent pretracheal lymph node measuring 1 cm in short axis unchanged. No new thoracic lymphadenopathy.   - Some irregular rectal wall thickening which may be related to patient's known neoplasm. Please see report of recent MRI rectum 04/02/2024 for more detail.  - Distal colonic diverticulosis without focal acute diverticulitis.  - Nonspecific mild wall prominence of the mid-distal sigmoid colon may be partially exaggerated by underdistention.  - No bowel obstruction.   - Small nonspecific retroperitoneal lymph nodes which are not individually pathologically enlarged.  - Nonspecific mild fat stranding of the retroperitoneum. No retroperitoneal fluid collection.  - Subcentimeter low-attenuation lesion in the dome of the left hepatic lobe too small to characterize but unchanged  from prior, more likely benign. No new focal hepatic lesion.    MRI rectum 4/2/24: Favorable treatment response.  The previous upper-mid rectal mass has significantly regressed, however there is still small focus of restricted diffusion in the mass posteriorly which could reflect residual tumor. Previous involvement of the mesorectal fascia and peritoneal reflection is no longer visualized, although there appear to be posttreatment related fibrosis at this site. The previously enlarged presacral lymph nodes have regressed in size, the dominant nodes from 7 mm short axis to 3mm. No current lymphadenopathy identified.    Cardiac stress test 10/20/23: Normal Lexiscan Myoview cardiac perfusion stress test. No evidence of ischemia or myocardial infarction by perfusion imaging. Normal left ventricular systolic function, ejection fraction 68%. None invasive risk stratification is low risk.    Colonoscopy 9/8/23 (in [place]): Prep of the colon was poor. Hemorrhoids found on perianal exam. Stool in the rectum and in the recto-sigmoid colon. Rule out malignancy, completely obstructing tumor in the recto-sigmoid colon. Biopsied. Tattooed. Path demonstrating invasive adenocarcinoma. MMR intact.     Current smoker- 1/4 ppd/No ETOH/No Illicit drug use  No family history of CRC or IBD  PMH: MI, coronary stents about 10 years ago, HTN, Anal cancer, A.fib  PSH: open cholecystectomy, open appendectomy, open hysterectomy, vascular surgery on right groin for blockage  Employment: Retired    Past Medical History:   Diagnosis Date    Cholelithiasis     High cholesterol     History of heart attack 2015    History of rectal or anal cancer     20 yrs ago    Hypertension     Presence of dental prosthetic device (complete) (partial)     upper and lower partail    Psoriasis     Wears glasses        Past Surgical History:   Procedure Laterality Date    ANUS SURGERY      APPENDECTOMY      CHOLECYSTECTOMY      COLONOSCOPY      CORONARY  ANGIOPLASTY WITH STENT PLACEMENT      FLEXIBLE SIGMOIDOSCOPY      HYSTERECTOMY      PELVIC LAPAROSCOPY       Current Outpatient Medications   Medication Sig Dispense Refill    aspirin 81 mg chewable tablet Chew 1 tablet (81 mg) once daily. 90 tablet 3    clobetasol (Temovate) 0.05 % cream Apply 1 Application topically 2 times a day as needed (per pateint as needed for psoriasis).      cyanocobalamin, vitamin B-12, (VITAMIN B-12 ORAL) Take by mouth.      docusate sodium (Colace) 100 mg capsule Take 1 capsule (100 mg) by mouth once daily.      lidocaine (Xylocaine) 5 % ointment Apply topically if needed for mild pain (1 - 3).      lisinopril 10 mg tablet Take 1 tablet (10 mg) by mouth 2 times a day. 180 tablet 3    metoprolol succinate XL (Toprol-XL) 25 mg 24 hr tablet Take 1 tablet (25 mg) by mouth once daily. Do not crush or chew. 90 tablet 3    prochlorperazine (Compazine) 5 mg tablet Take 1 tablet (5 mg) by mouth every 6 hours if needed for nausea or vomiting.      rosuvastatin (Crestor) 5 mg tablet Take 1 tablet (5 mg) by mouth once daily. 90 tablet 3     No current facility-administered medications for this visit.      No Known Allergies    Review of Systems   Constitutional:  Negative for activity change, appetite change, chills, diaphoresis, fatigue, fever and unexpected weight change.   Respiratory:  Negative for cough, chest tightness and shortness of breath.    Cardiovascular:  Negative for chest pain, palpitations and leg swelling.   Gastrointestinal:  Negative for abdominal distention, abdominal pain, anal bleeding, blood in stool, constipation, diarrhea, nausea (the week of her chemotherapy), rectal pain and vomiting.   Endocrine: Negative for polydipsia, polyphagia and polyuria.   Genitourinary:  Negative for difficulty urinating, dysuria, frequency, hematuria and pelvic pain.   Skin:  Negative for color change.   Neurological:  Negative for dizziness, seizures and light-headedness.   Hematological:  Negative.    All other systems reviewed and are negative.      Physical Exam  Constitutional:       General: She is not in acute distress.     Appearance: Normal appearance. She is not ill-appearing.   HENT:      Head: Normocephalic.      Mouth/Throat:      Mouth: Mucous membranes are moist.   Eyes:      Extraocular Movements: Extraocular movements intact.      Pupils: Pupils are equal, round, and reactive to light.   Cardiovascular:      Rate and Rhythm: Normal rate and regular rhythm.      Pulses: Normal pulses.      Heart sounds: Normal heart sounds. No murmur heard.  Pulmonary:      Effort: Pulmonary effort is normal. No respiratory distress.      Breath sounds: Normal breath sounds. No wheezing, rhonchi or rales.   Chest:      Chest wall: No tenderness.   Abdominal:      General: There is no distension.      Palpations: Abdomen is soft. There is no mass.      Tenderness: There is no abdominal tenderness. There is no guarding or rebound.      Hernia: No hernia is present.      Comments: Well healed right wilfredo-abdomen incisional and Pfannenstiel incisional scars.  Well healed small periumbilical incisional scar.   Musculoskeletal:         General: No swelling or deformity. Normal range of motion.      Cervical back: Normal range of motion and neck supple. No rigidity.      Right lower leg: No edema.      Left lower leg: No edema.   Skin:     General: Skin is warm and dry.      Capillary Refill: Capillary refill takes less than 2 seconds.      Coloration: Skin is not jaundiced or pale.   Neurological:      General: No focal deficit present.      Mental Status: She is alert and oriented to person, place, and time. Mental status is at baseline.   Psychiatric:         Mood and Affect: Mood normal.         Behavior: Behavior normal.         Thought Content: Thought content normal.         Judgment: Judgment normal.     No neck/cervical/inguinal LAD      Assessment and Plan:   #Upper rectal adenocarcinoma, MMR intact  per review of OSH pathology report, S/P diagnostic laparoscopy with some colonic mobilization and THERON at [facility] 10/27/2023 with aborted attempt at oncologic resection followed by chemotherapy (FOLFOX), final 8th cycle 3/4/2024  -  Robotic-assisted laparoscopic low anterior resection, CRA, flexible sigmoidoscopy, DLI  -  Plan for 5/17  -  Case request submitted  -  PAT and stoma marking  -  Bowel prep day before surgery    #Remote hx anal Ca S/P primary resection and CRT  -  No evidence of recurrence    #CAD s/p remote PCI w/ stent insertion  -  Was cleared by cardiology prior to diagnostic laparoscopy procedure  -  Currently on 81mg ASA instead of plavix  -  OK to continue 81mg ASA perioperatively    Gustavo Harp MD   5/1/2024  9:24 PM

## 2024-04-24 ENCOUNTER — TUMOR BOARD CONFERENCE (OUTPATIENT)
Dept: HEMATOLOGY/ONCOLOGY | Facility: HOSPITAL | Age: 69
End: 2024-04-24
Payer: COMMERCIAL

## 2024-04-24 LAB
LABORATORY COMMENT REPORT: NORMAL
PATH REPORT.FINAL DX SPEC: NORMAL
PATH REPORT.GROSS SPEC: NORMAL
PATH REPORT.RELEVANT HX SPEC: NORMAL
PATH REPORT.TOTAL CANCER: NORMAL

## 2024-05-01 ENCOUNTER — OFFICE VISIT (OUTPATIENT)
Dept: SURGERY | Facility: CLINIC | Age: 69
End: 2024-05-01
Payer: COMMERCIAL

## 2024-05-01 VITALS
BODY MASS INDEX: 29.25 KG/M2 | WEIGHT: 182 LBS | HEART RATE: 63 BPM | TEMPERATURE: 97.4 F | DIASTOLIC BLOOD PRESSURE: 82 MMHG | HEIGHT: 66 IN | SYSTOLIC BLOOD PRESSURE: 154 MMHG

## 2024-05-01 DIAGNOSIS — C20 RECTAL CANCER (MULTI): ICD-10-CM

## 2024-05-01 PROCEDURE — 99214 OFFICE O/P EST MOD 30 MIN: CPT | Performed by: STUDENT IN AN ORGANIZED HEALTH CARE EDUCATION/TRAINING PROGRAM

## 2024-05-01 PROCEDURE — 3008F BODY MASS INDEX DOCD: CPT | Performed by: STUDENT IN AN ORGANIZED HEALTH CARE EDUCATION/TRAINING PROGRAM

## 2024-05-01 PROCEDURE — 1159F MED LIST DOCD IN RCRD: CPT | Performed by: STUDENT IN AN ORGANIZED HEALTH CARE EDUCATION/TRAINING PROGRAM

## 2024-05-01 RX ORDER — METRONIDAZOLE 250 MG/1
TABLET ORAL
Qty: 3 TABLET | Refills: 0 | Status: SHIPPED | OUTPATIENT
Start: 2024-05-01 | End: 2024-05-23 | Stop reason: HOSPADM

## 2024-05-01 RX ORDER — NEOMYCIN SULFATE 500 MG/1
TABLET ORAL
Qty: 6 TABLET | Refills: 0 | Status: SHIPPED | OUTPATIENT
Start: 2024-05-01 | End: 2024-05-23 | Stop reason: HOSPADM

## 2024-05-01 RX ORDER — GABAPENTIN 100 MG/1
CAPSULE ORAL
Qty: 3 CAPSULE | Refills: 0 | Status: SHIPPED | OUTPATIENT
Start: 2024-05-01 | End: 2024-05-23 | Stop reason: HOSPADM

## 2024-05-03 ASSESSMENT — ENCOUNTER SYMPTOMS
ENDOCRINE NEGATIVE: 1
NECK NEGATIVE: 1
CONSTITUTIONAL NEGATIVE: 1
CARDIOVASCULAR NEGATIVE: 1
EYES NEGATIVE: 1
RESPIRATORY NEGATIVE: 1
MUSCULOSKELETAL NEGATIVE: 1

## 2024-05-03 NOTE — CPM/PAT H&P
"CPM/PAT Evaluation       Name: Janis Ibanez (Janis Ibanez \"Marquita\")  /Age: 1955/68 y.o.     In-Person       Chief Complaint: rectosigmoid mass    HPI  This is a pleasant 67 yo with PmHx of rectal cancer 20 years ago s/p chemo and radiation.  Pt has recurrent symptoms of difficulty with bowel movements last September.  She had colonoscopy done with + biopsy done in [place] last October.   Pt started chemo last October which she completed last month.  Pt has improvement of a bowel movements.  Pt denies any blood in stools.  No recent fever or chills.     Past Medical History:   Diagnosis Date    Cholelithiasis     Coronary artery disease     High cholesterol     History of heart attack     History of rectal or anal cancer     20 yrs ago    Hypertension     Presence of dental prosthetic device (complete) (partial)     upper and lower partail    Psoriasis     Rectal cancer (Multi)     Wears glasses        Past Surgical History:   Procedure Laterality Date    ANGIOPLASTY      ANUS SURGERY      APPENDECTOMY      CHOLECYSTECTOMY      COLONOSCOPY      CORONARY ANGIOPLASTY WITH STENT PLACEMENT      FLEXIBLE SIGMOIDOSCOPY      HYSTERECTOMY      LAPAROSCOPY DIAGNOSTIC / BIOPSY / ASPIRATION / LYSIS      10/27/2023 noted bulk disease    PELVIC LAPAROSCOPY         Patient Sexual activity questions deferred to the physician.    Family History   Problem Relation Name Age of Onset    Hypertension Mother      Heart disease Mother      Other (cardiac stents) Mother      Hypertension Father      Heart attack Father      Prostate cancer Father         No Known Allergies    Prior to Admission medications    Medication Sig Start Date End Date Taking? Authorizing Provider   aspirin 81 mg chewable tablet Chew 1 tablet (81 mg) once daily. 24  Kolton Lam MD   clobetasol (Temovate) 0.05 % cream Apply 1 Application topically 2 times a day as needed (per pateint as needed for psoriasis).    Historical " Provider, MD   cyanocobalamin, vitamin B-12, (VITAMIN B-12 ORAL) Take by mouth.    Historical Provider, MD   docusate sodium (Colace) 100 mg capsule Take 1 capsule (100 mg) by mouth once daily.    Historical Provider, MD   gabapentin (Neurontin) 100 mg capsule Take one capsule by mouth at bedtime starting 3 days before surgery 5/1/24   Gustavo Harp MD   lidocaine (Xylocaine) 5 % ointment Apply topically if needed for mild pain (1 - 3).    Historical Provider, MD   lisinopril 10 mg tablet Take 1 tablet (10 mg) by mouth 2 times a day. 2/13/24 2/12/25  Kolton Lam MD   metoprolol succinate XL (Toprol-XL) 25 mg 24 hr tablet Take 1 tablet (25 mg) by mouth once daily. Do not crush or chew. 2/13/24 2/12/25  Kolton Lam MD   metroNIDAZOLE (Flagyl) 250 mg tablet Take one tablet by mouth at 6:00pm, 7:00pm, 11:00pm on the day prior to surgery 5/1/24   Gustavo Harp MD   neomycin (Mycifradin) 500 mg tablet Take two tablets (1000mg) by mouth at 6:00pm, 7:00pm, and 11:00pm on the day prior to surgery 5/1/24   Gustavo Harp MD   prochlorperazine (Compazine) 5 mg tablet Take 1 tablet (5 mg) by mouth every 6 hours if needed for nausea or vomiting.    Historical Provider, MD   rosuvastatin (Crestor) 5 mg tablet Take 1 tablet (5 mg) by mouth once daily. 2/13/24 2/12/25  Kolton Lam MD        PAT ROS:   Constitutional:   neg    Neuro/Psych:    Tingling in fingers and toes neuropathy from chemo  Eyes:   neg     use of corrective lenses  Ears:   neg    Nose:   neg    Mouth:   neg    Throat:   neg    Neck:   neg    Cardio:   neg    Respiratory:   neg    Endocrine:   neg    GI:    See HPI  :   neg    Musculoskeletal:   neg    Hematologic:    Pt finished chemo last month getting lab work done in a few days at [facility]  Skin:  neg        Physical Exam  Constitutional:       Appearance: Normal appearance.   HENT:      Head: Normocephalic and atraumatic.      Nose: Nose normal.      Mouth/Throat:      Mouth: Mucous  membranes are moist.   Eyes:      Pupils: Pupils are equal, round, and reactive to light.   Cardiovascular:      Rate and Rhythm: Normal rate and regular rhythm.   Pulmonary:      Effort: Pulmonary effort is normal.      Breath sounds: Normal breath sounds.   Abdominal:      General: Bowel sounds are normal.      Palpations: Abdomen is soft.   Musculoskeletal:      Cervical back: Normal range of motion.      Comments: No LE edema   Skin:     General: Skin is warm and dry.   Neurological:      General: No focal deficit present.      Mental Status: She is alert and oriented to person, place, and time.   Psychiatric:         Mood and Affect: Mood normal.         Behavior: Behavior normal.        Airway: nl ROM  Anesthesia:  Patient denies any anesthesia complications.   Teeth:  partials upper and lower  ECG: sinus luis alfredo  rate of 57   Lab Results   Component Value Date    GLUCOSE 101 (H) 05/08/2024    CALCIUM 10.3 05/08/2024     05/08/2024    K 5.0 05/08/2024    CO2 27 05/08/2024     05/08/2024    BUN 12 05/08/2024    CREATININE 0.76 05/08/2024         MRI rectum 4/2/20IMPRESSION:  Favorable treatment response.  The previous upper-mid rectal mass has significantly regressed,  however there is still small focus of restricted diffusion in the  mass posteriorly which could reflect residual tumor. Previous  involvement of the mesorectal fascia and peritoneal reflection is no  longer visualized, although there appear to be posttreatment related  fibrosis at this site. The previously enlarged presacral lymph nodes  have regressed in size, the dominant nodes from 7 mm short axis to 3  mm. No current lymphadenopathy identified.      Signed by: Angelito Shaw 4/2/2024 12:02 PM24    Lexiscan 10 20 2023  IMPRESSION:  Normal Lexiscan Myoview cardiac perfusion stress test.  No evidence of ischemia or myocardial infarction by perfusion imaging.  Normal left ventricular systolic function, ejection fraction 68%.  None invasive  risk stratification is low risk.      Signed by: Micah Peñaloza 10/20/2023 11:31 AM    Visit Vitals  /79   Pulse 59   Temp 36.1 °C (97 °F) (Tympanic)   Resp 16       DASI Risk Score      Flowsheet Row Most Recent Value   DASI SCORE 58.2   METS Score (Will be calculated only when all the questions are answered) 9.9          Caprini DVT Assessment      Flowsheet Row Most Recent Value   DVT Score 12   Current Status Major surgery planned, lasting 2-3 hours, Present cancer or chemotherapy   History Prior major surgery, Acute myocardial infarction, Previous malignancy   Age 60-75 years   BMI 30 or less          Modified Frailty Index      Flowsheet Row Most Recent Value   Modified Frailty Index Calculator .2727          CHADS2 Stroke Risk  Current as of 8 minutes ago        N/A 3 to 100%: High Risk   2 to < 3%: Medium Risk   0 to < 2%: Low Risk     Last Change: N/A          This score determines the patient's risk of having a stroke if the patient has atrial fibrillation.        This score is not applicable to this patient. Components are not calculated.          Revised Cardiac Risk Index    No data to display       Apfel Simplified Score    No data to display       Risk Analysis Index Results This Encounter         5/7/2024  1349             SUÁREZ Cancer History: Patient indicates history of cancer    Total Risk Analysis Index Score Without Cancer: 24    Total Risk Analysis Index Score: 38          Stop Bang Score      Flowsheet Row Most Recent Value   Do you snore loudly? 1   Do you often feel tired or fatigued after your sleep? 0   Has anyone ever observed you stop breathing in your sleep? 0   Do you have or are you being treated for high blood pressure? 1   Recent BMI (Calculated) 29.4   Is BMI greater than 35 kg/m2? 0=No   Age older than 50 years old? 1=Yes   Is your neck circumference greater than 17 inches (Male) or 16 inches (Female)? 0   Gender - Male 0=No   STOP-BANG Total Score 3            Assessment and  Plan:     Plan for OR on 5/17 for   Robotic or Laparoscopic possible open Low Anterior Resection, Possible stoma [P40133 (CPT®)]     MRSA, BMP  Also getting labs updated at [facility] center 5/13  Smoking cessation d/w patient -pt states she is quitting completely the day of surgery

## 2024-05-07 ASSESSMENT — DUKE ACTIVITY SCORE INDEX (DASI)
CAN YOU DO MODERATE WORK AROUND THE HOUSE LIKE VACUUMING, SWEEPING FLOORS OR CARRYING GROCERIES: YES
DASI METS SCORE: 9.9
CAN YOU WALK INDOORS, SUCH AS AROUND YOUR HOUSE: YES
CAN YOU DO LIGHT WORK AROUND THE HOUSE LIKE DUSTING OR WASHING DISHES: YES
CAN YOU DO YARD WORK LIKE RAKING LEAVES, WEEDING OR PUSHING A MOWER: YES
CAN YOU HAVE SEXUAL RELATIONS: YES
CAN YOU TAKE CARE OF YOURSELF (EAT, DRESS, BATHE, OR USE TOILET): YES
TOTAL_SCORE: 58.2
CAN YOU PARTICIPATE IN STRENOUS SPORTS LIKE SWIMMING, SINGLES TENNIS, FOOTBALL, BASKETBALL, OR SKIING: YES
CAN YOU PARTICIPATE IN MODERATE RECREATIONAL ACTIVITIES LIKE GOLF, BOWLING, DANCING, DOUBLES TENNIS OR THROWING A BASEBALL OR FOOTBALL: YES
CAN YOU CLIMB A FLIGHT OF STAIRS OR WALK UP A HILL: YES
CAN YOU RUN A SHORT DISTANCE: YES
CAN YOU WALK A BLOCK OR TWO ON LEVEL GROUND: YES
CAN YOU DO HEAVY WORK AROUND THE HOUSE LIKE SCRUBBING FLOORS OR LIFTING AND MOVING HEAVY FURNITURE: YES

## 2024-05-07 ASSESSMENT — LIFESTYLE VARIABLES: SMOKING_STATUS: SMOKER

## 2024-05-07 ASSESSMENT — ACTIVITIES OF DAILY LIVING (ADL): ADL_SCORE: 0

## 2024-05-08 ENCOUNTER — LAB (OUTPATIENT)
Dept: LAB | Facility: LAB | Age: 69
End: 2024-05-08
Payer: COMMERCIAL

## 2024-05-08 ENCOUNTER — PRE-ADMISSION TESTING (OUTPATIENT)
Dept: PREADMISSION TESTING | Facility: HOSPITAL | Age: 69
End: 2024-05-08
Payer: COMMERCIAL

## 2024-05-08 VITALS
HEART RATE: 59 BPM | HEIGHT: 66 IN | OXYGEN SATURATION: 98 % | BODY MASS INDEX: 29.55 KG/M2 | TEMPERATURE: 97 F | SYSTOLIC BLOOD PRESSURE: 133 MMHG | DIASTOLIC BLOOD PRESSURE: 71 MMHG | RESPIRATION RATE: 16 BRPM | WEIGHT: 183.86 LBS

## 2024-05-08 DIAGNOSIS — C20 RECTAL CANCER (MULTI): ICD-10-CM

## 2024-05-08 DIAGNOSIS — Z01.818 PRE-OP TESTING: Primary | ICD-10-CM

## 2024-05-08 LAB
ANION GAP SERPL CALC-SCNC: 12 MMOL/L (ref 10–20)
BUN SERPL-MCNC: 12 MG/DL (ref 6–23)
CALCIUM SERPL-MCNC: 10.3 MG/DL (ref 8.6–10.3)
CHLORIDE SERPL-SCNC: 105 MMOL/L (ref 98–107)
CO2 SERPL-SCNC: 27 MMOL/L (ref 21–32)
CREAT SERPL-MCNC: 0.76 MG/DL (ref 0.5–1.05)
EGFRCR SERPLBLD CKD-EPI 2021: 87 ML/MIN/1.73M*2
GLUCOSE SERPL-MCNC: 101 MG/DL (ref 74–99)
POTASSIUM SERPL-SCNC: 5 MMOL/L (ref 3.5–5.3)
SODIUM SERPL-SCNC: 139 MMOL/L (ref 136–145)

## 2024-05-08 PROCEDURE — 93005 ELECTROCARDIOGRAM TRACING: CPT

## 2024-05-08 PROCEDURE — 80048 BASIC METABOLIC PNL TOTAL CA: CPT

## 2024-05-08 PROCEDURE — 99204 OFFICE O/P NEW MOD 45 MIN: CPT | Performed by: NURSE PRACTITIONER

## 2024-05-08 PROCEDURE — 93010 ELECTROCARDIOGRAM REPORT: CPT | Performed by: INTERNAL MEDICINE

## 2024-05-08 PROCEDURE — 36415 COLL VENOUS BLD VENIPUNCTURE: CPT

## 2024-05-08 PROCEDURE — 87081 CULTURE SCREEN ONLY: CPT | Mod: STJLAB

## 2024-05-08 RX ORDER — OMEPRAZOLE 20 MG/1
20 CAPSULE, DELAYED RELEASE ORAL DAILY
COMMUNITY

## 2024-05-08 RX ORDER — CHLORHEXIDINE GLUCONATE ORAL RINSE 1.2 MG/ML
SOLUTION DENTAL
Qty: 473 ML | Refills: 0 | Status: SHIPPED | OUTPATIENT
Start: 2024-05-08 | End: 2024-05-23 | Stop reason: HOSPADM

## 2024-05-08 ASSESSMENT — ENCOUNTER SYMPTOMS: ROS GI COMMENTS: SEE HPI

## 2024-05-08 ASSESSMENT — PAIN - FUNCTIONAL ASSESSMENT: PAIN_FUNCTIONAL_ASSESSMENT: 0-10

## 2024-05-08 ASSESSMENT — PAIN SCALES - GENERAL: PAINLEVEL_OUTOF10: 0 - NO PAIN

## 2024-05-08 NOTE — PREPROCEDURE INSTRUCTIONS
Medication List            Accurate as of May 8, 2024  9:05 AM. Always use your most recent med list.                aspirin 81 mg chewable tablet  Chew 1 tablet (81 mg) once daily.  Medication Adjustments for Surgery: Stop 7 days before surgery     chlorhexidine 0.12 % solution  Commonly known as: Peridex  Sig: 15 mls swish and spit the night before surgery and 15mls swish and spit the morning of surgery do not swallow  Notes to patient: Take as instructed     docusate sodium 100 mg capsule  Commonly known as: Colace  Medication Adjustments for Surgery: Continue until night before surgery  Notes to patient: If needed     gabapentin 100 mg capsule  Commonly known as: Neurontin  Take one capsule by mouth at bedtime starting 3 days before surgery  Medication Adjustments for Surgery: Take morning of surgery with sip of water, no other fluids     lidocaine 5 % ointment  Commonly known as: Xylocaine  Notes to patient: As needed for port access     lisinopril 10 mg tablet  Take 1 tablet (10 mg) by mouth 2 times a day.  Medication Adjustments for Surgery: Stop 1 day before surgery     metoprolol succinate XL 25 mg 24 hr tablet  Commonly known as: Toprol-XL  Take 1 tablet (25 mg) by mouth once daily. Do not crush or chew.  Medication Adjustments for Surgery: Take morning of surgery with sip of water, no other fluids     metroNIDAZOLE 250 mg tablet  Commonly known as: Flagyl  Take one tablet by mouth at 6:00pm, 7:00pm, 11:00pm on the day prior to surgery  Notes to patient: Take as instructed     neomycin 500 mg tablet  Commonly known as: Mycifradin  Take two tablets (1000mg) by mouth at 6:00pm, 7:00pm, and 11:00pm on the day prior to surgery  Notes to patient: Take as instructed     omeprazole 20 mg DR capsule  Commonly known as: PriLOSEC  Medication Adjustments for Surgery: Take morning of surgery with sip of water, no other fluids     prochlorperazine 5 mg tablet  Commonly known as: Compazine  Medication Adjustments for  Surgery: Continue until night before surgery     rosuvastatin 5 mg tablet  Commonly known as: Crestor  Take 1 tablet (5 mg) by mouth once daily.  Medication Adjustments for Surgery: Take morning of surgery with sip of water, no other fluids     VITAMIN B-12 ORAL  Medication Adjustments for Surgery: Stop 7 days before surgery                          PRE-OPERATIVE INSTRUCTIONS    You will receive notification one business day prior to your procedure to confirm your arrival time. It is important that you answer your phone and/or check your messages during this time. If you do not hear from the surgery center by 5 pm. the day before your procedure, please call [phone].     Please enter the building through the Outpatient entrance and take the elevator off the lobby to the 2nd floor then check in at the Outpatient Surgery desk on the 2nd floor.    INSTRUCTIONS:  Talk to your surgeon for instructions if you should stop your aspirin, blood thinner, or diabetes medicines.  DO NOT take any multivitamins or over the counter supplements for 7-10 days before surgery.  If not being admitted, you must have an adult immediately available to drive you home after surgery. We also highly recommend you have someone stay with you for the entire day and night of your surgery.  For children having surgery, a parent or legal guardian must accompany them to the surgery center. If this is not possible, please call [phone] to make additional arrangements.  For adults who are unable to consent or make medical decisions for themselves, a legal guardian or Power of  must accompany them to the surgery center. If this is not possible, please call [phone] to make additional arrangements.  Wear comfortable, loose fitting clothing.  All jewelry and piercings must be removed. If you are unable to remove an item or have a dermal piercing, please be sure to tell the nurse when you arrive for surgery.  Nail polish and make-up  must be removed.  Avoid smoking or consuming alcohol for 24 hours before surgery.  To help prevent infection, please take a shower/bath and wash your hair the night before and/or morning of surgery (or follow other specific bathing instructions provided).    Preoperative Fasting Guidelines    Why must I stop eating and drinking near surgery time?  With sedation, food or liquid in your stomach can enter your lungs causing serious complications  Increases nausea and vomiting    When do I need to stop eating and drinking before my surgery?  Do not eat any solid food after midnight the night before your surgery/procedure unless otherwise instructed by your surgeon.   You may have up to 13.5 ounces of clear liquid until TWO hours before your instructed arrival time to the hospital.  This includes water, black tea/coffee, (no milk or cream) apple juice, and electrolyte drinks (Gatorade).   You may chew gum until TWO hours before your surgery/procedure      If applicable, notify your surgeons office immediately of any new skin changes that occur to the surgical limb.      If you have any questions or concerns, please call Pre-Admission Testing at [phone].

## 2024-05-08 NOTE — PREPROCEDURE INSTRUCTIONS
Patient and Family Education Quitting Smoking or Tobacco       Recognizing Dangerous Situations:   Alcohol use during the first month after quitting   Being around smoke or someone who smokes    Times situation routinely smoked   Triggers: car, breaks, coffee, when awakening, social events     Coping Skills:   Learning new ways to manage stress   Exercising   Relaxation breathing   Change routines   Distraction techniques     Websites:   Smoke-Free - offers free text messages and an jonathan to help you quit. Info includes eating and mood issues that may come with quitting. There is a Live Helpline to talk to an expert. Go to smokefree.gov     Become an Ex-Smoker - the free EX Plan is based on scientific research and useful advice from ex-smokers. It isn't just about quitting smoking. It's about re-learning life without cigarettes using a 3-step program. Go to becomeanex.[employer]     Centers for Disease Control - offer many suggestions for helping you quit. Includes a Quit Guide and real-life stories. There are sections for specific groups such as LGBT, , different ethnic groups, and pregnant women. Go to cdc.gov/tobacco/campaign/tips     Other Resources:     [place] Tobacco Quit Line - call 1-800-QUIT-NOW or 1-755.959.8692.   EatStreet 2-1-1 - to find local programs and resources. Call 211 or go to 211.[employer]. [facility] Tobacco Cessation Program - call 591-536-7063.   American Lung Association - offers classes for quitting smoking. Some places may charge a fee. For a list of classes, go to lung.org or call 3-996-LUNG-USA.     Some things to think about:   The health benefits of quitting smoking can help most of the major parts of your body.   There is no safe amount of cigarette smoke. Quitting smoking can add years to your life.   When you quit, you'll also protect your loved ones from dangerous secondhand smoke.   Make a plan, join a support group, and talk to your  physician to assist in quitting smoking.     ©[facility], 11/20; PI-602 (6/22)

## 2024-05-08 NOTE — PREPROCEDURE INSTRUCTIONS
Medication List            Accurate as of May 8, 2024  8:58 AM. Always use your most recent med list.                aspirin 81 mg chewable tablet  Chew 1 tablet (81 mg) once daily.  Medication Adjustments for Surgery: Stop 7 days before surgery     docusate sodium 100 mg capsule  Commonly known as: Colace  Medication Adjustments for Surgery: Continue until night before surgery  Notes to patient: If needed     gabapentin 100 mg capsule  Commonly known as: Neurontin  Take one capsule by mouth at bedtime starting 3 days before surgery  Medication Adjustments for Surgery: Take morning of surgery with sip of water, no other fluids     lidocaine 5 % ointment  Commonly known as: Xylocaine  Notes to patient: As needed for port access     lisinopril 10 mg tablet  Take 1 tablet (10 mg) by mouth 2 times a day.  Medication Adjustments for Surgery: Stop 1 day before surgery     metoprolol succinate XL 25 mg 24 hr tablet  Commonly known as: Toprol-XL  Take 1 tablet (25 mg) by mouth once daily. Do not crush or chew.  Medication Adjustments for Surgery: Take morning of surgery with sip of water, no other fluids     metroNIDAZOLE 250 mg tablet  Commonly known as: Flagyl  Take one tablet by mouth at 6:00pm, 7:00pm, 11:00pm on the day prior to surgery  Notes to patient: Take as instructed     neomycin 500 mg tablet  Commonly known as: Mycifradin  Take two tablets (1000mg) by mouth at 6:00pm, 7:00pm, and 11:00pm on the day prior to surgery  Notes to patient: Take as instructed     omeprazole 20 mg DR capsule  Commonly known as: PriLOSEC  Medication Adjustments for Surgery: Take morning of surgery with sip of water, no other fluids     prochlorperazine 5 mg tablet  Commonly known as: Compazine  Medication Adjustments for Surgery: Continue until night before surgery     rosuvastatin 5 mg tablet  Commonly known as: Crestor  Take 1 tablet (5 mg) by mouth once daily.  Medication Adjustments for Surgery: Take morning of surgery with sip  of water, no other fluids     VITAMIN B-12 ORAL  Medication Adjustments for Surgery: Stop 7 days before surgery                          PRE-OPERATIVE INSTRUCTIONS    You will receive notification one business day prior to your procedure to confirm your arrival time. It is important that you answer your phone and/or check your messages during this time. If you do not hear from the surgery center by 5 pm. the day before your procedure, please call [phone].     Please enter the building through the Outpatient entrance and take the elevator off the lobby to the 2nd floor then check in at the Outpatient Surgery desk on the 2nd floor.    INSTRUCTIONS:  Talk to your surgeon for instructions if you should stop your aspirin, blood thinner, or diabetes medicines.  DO NOT take any multivitamins or over the counter supplements for 7-10 days before surgery.  If not being admitted, you must have an adult immediately available to drive you home after surgery. We also highly recommend you have someone stay with you for the entire day and night of your surgery.  For children having surgery, a parent or legal guardian must accompany them to the surgery center. If this is not possible, please call [phone] to make additional arrangements.  For adults who are unable to consent or make medical decisions for themselves, a legal guardian or Power of  must accompany them to the surgery center. If this is not possible, please call [phone] to make additional arrangements.  Wear comfortable, loose fitting clothing.  All jewelry and piercings must be removed. If you are unable to remove an item or have a dermal piercing, please be sure to tell the nurse when you arrive for surgery.  Nail polish and make-up must be removed.  Avoid smoking or consuming alcohol for 24 hours before surgery.  To help prevent infection, please take a shower/bath and wash your hair the night before and/or morning of surgery (or follow other  specific bathing instructions provided).    Preoperative Fasting Guidelines    Why must I stop eating and drinking near surgery time?  With sedation, food or liquid in your stomach can enter your lungs causing serious complications  Increases nausea and vomiting    When do I need to stop eating and drinking before my surgery?  Do not eat any solid food after midnight the night before your surgery/procedure unless otherwise instructed by your surgeon.   You may have up to 13.5 ounces of clear liquid until TWO hours before your instructed arrival time to the hospital.  This includes water, black tea/coffee, (no milk or cream) apple juice, and electrolyte drinks (Gatorade).   You may chew gum until TWO hours before your surgery/procedure      If applicable, notify your surgeons office immediately of any new skin changes that occur to the surgical limb.      If you have any questions or concerns, please call Pre-Admission Testing at [phone].

## 2024-05-09 LAB
ATRIAL RATE: 57 BPM
P AXIS: 17 DEGREES
P OFFSET: 201 MS
P ONSET: 152 MS
PR INTERVAL: 136 MS
Q ONSET: 220 MS
QRS COUNT: 10 BEATS
QRS DURATION: 72 MS
QT INTERVAL: 416 MS
QTC CALCULATION(BAZETT): 404 MS
QTC FREDERICIA: 409 MS
R AXIS: -8 DEGREES
T AXIS: 24 DEGREES
T OFFSET: 428 MS
VENTRICULAR RATE: 57 BPM

## 2024-05-10 LAB — STAPHYLOCOCCUS SPEC CULT: NORMAL

## 2024-05-15 ENCOUNTER — PREP FOR PROCEDURE (OUTPATIENT)
Dept: SURGERY | Facility: HOSPITAL | Age: 69
End: 2024-05-15
Payer: COMMERCIAL

## 2024-05-15 DIAGNOSIS — C20 RECTAL CANCER (MULTI): Primary | ICD-10-CM

## 2024-05-15 RX ORDER — SODIUM CHLORIDE, SODIUM LACTATE, POTASSIUM CHLORIDE, CALCIUM CHLORIDE 600; 310; 30; 20 MG/100ML; MG/100ML; MG/100ML; MG/100ML
100 INJECTION, SOLUTION INTRAVENOUS CONTINUOUS
Status: CANCELLED | OUTPATIENT
Start: 2024-05-15

## 2024-05-17 ENCOUNTER — ANESTHESIA (OUTPATIENT)
Dept: OPERATING ROOM | Facility: HOSPITAL | Age: 69
DRG: 330 | End: 2024-05-17
Payer: COMMERCIAL

## 2024-05-17 ENCOUNTER — HOSPITAL ENCOUNTER (INPATIENT)
Facility: HOSPITAL | Age: 69
LOS: 6 days | Discharge: HOME HEALTH | DRG: 330 | End: 2024-05-23
Attending: STUDENT IN AN ORGANIZED HEALTH CARE EDUCATION/TRAINING PROGRAM | Admitting: STUDENT IN AN ORGANIZED HEALTH CARE EDUCATION/TRAINING PROGRAM
Payer: COMMERCIAL

## 2024-05-17 ENCOUNTER — ANESTHESIA EVENT (OUTPATIENT)
Dept: OPERATING ROOM | Facility: HOSPITAL | Age: 69
DRG: 330 | End: 2024-05-17
Payer: COMMERCIAL

## 2024-05-17 DIAGNOSIS — C20 RECTAL CANCER (MULTI): Primary | ICD-10-CM

## 2024-05-17 PROCEDURE — 44139 MOBILIZATION OF COLON: CPT | Performed by: STUDENT IN AN ORGANIZED HEALTH CARE EDUCATION/TRAINING PROGRAM

## 2024-05-17 PROCEDURE — 88309 TISSUE EXAM BY PATHOLOGIST: CPT | Mod: TC,STJLAB | Performed by: STUDENT IN AN ORGANIZED HEALTH CARE EDUCATION/TRAINING PROGRAM

## 2024-05-17 PROCEDURE — 3600000009 HC OR TIME - EACH INCREMENTAL 1 MINUTE - PROCEDURE LEVEL FOUR: Performed by: STUDENT IN AN ORGANIZED HEALTH CARE EDUCATION/TRAINING PROGRAM

## 2024-05-17 PROCEDURE — A44204 PR LAP,SURG,COLECTOMY, PARTIAL, W/ANAST: Performed by: ANESTHESIOLOGY

## 2024-05-17 PROCEDURE — 0DTN0ZZ RESECTION OF SIGMOID COLON, OPEN APPROACH: ICD-10-PCS | Performed by: STUDENT IN AN ORGANIZED HEALTH CARE EDUCATION/TRAINING PROGRAM

## 2024-05-17 PROCEDURE — 44146 PARTIAL REMOVAL OF COLON: CPT | Performed by: STUDENT IN AN ORGANIZED HEALTH CARE EDUCATION/TRAINING PROGRAM

## 2024-05-17 PROCEDURE — A44204 PR LAP,SURG,COLECTOMY, PARTIAL, W/ANAST: Performed by: NURSE ANESTHETIST, CERTIFIED REGISTERED

## 2024-05-17 PROCEDURE — 2720000007 HC OR 272 NO HCPCS: Performed by: STUDENT IN AN ORGANIZED HEALTH CARE EDUCATION/TRAINING PROGRAM

## 2024-05-17 PROCEDURE — 2500000004 HC RX 250 GENERAL PHARMACY W/ HCPCS (ALT 636 FOR OP/ED): Performed by: NURSE ANESTHETIST, CERTIFIED REGISTERED

## 2024-05-17 PROCEDURE — 36620 INSERTION CATHETER ARTERY: CPT | Performed by: NURSE ANESTHETIST, CERTIFIED REGISTERED

## 2024-05-17 PROCEDURE — 8E0W0CZ ROBOTIC ASSISTED PROCEDURE OF TRUNK REGION, OPEN APPROACH: ICD-10-PCS | Performed by: STUDENT IN AN ORGANIZED HEALTH CARE EDUCATION/TRAINING PROGRAM

## 2024-05-17 PROCEDURE — 2500000004 HC RX 250 GENERAL PHARMACY W/ HCPCS (ALT 636 FOR OP/ED): Mod: JZ | Performed by: STUDENT IN AN ORGANIZED HEALTH CARE EDUCATION/TRAINING PROGRAM

## 2024-05-17 PROCEDURE — 99238 HOSP IP/OBS DSCHRG MGMT 30/<: CPT | Performed by: STUDENT IN AN ORGANIZED HEALTH CARE EDUCATION/TRAINING PROGRAM

## 2024-05-17 PROCEDURE — 7100000002 HC RECOVERY ROOM TIME - EACH INCREMENTAL 1 MINUTE: Performed by: STUDENT IN AN ORGANIZED HEALTH CARE EDUCATION/TRAINING PROGRAM

## 2024-05-17 PROCEDURE — 1100000001 HC PRIVATE ROOM DAILY

## 2024-05-17 PROCEDURE — 3700000002 HC GENERAL ANESTHESIA TIME - EACH INCREMENTAL 1 MINUTE: Performed by: STUDENT IN AN ORGANIZED HEALTH CARE EDUCATION/TRAINING PROGRAM

## 2024-05-17 PROCEDURE — 7100000001 HC RECOVERY ROOM TIME - INITIAL BASE CHARGE: Performed by: STUDENT IN AN ORGANIZED HEALTH CARE EDUCATION/TRAINING PROGRAM

## 2024-05-17 PROCEDURE — 88309 TISSUE EXAM BY PATHOLOGIST: CPT | Performed by: PATHOLOGY

## 2024-05-17 PROCEDURE — 0D1B0Z4 BYPASS ILEUM TO CUTANEOUS, OPEN APPROACH: ICD-10-PCS | Performed by: STUDENT IN AN ORGANIZED HEALTH CARE EDUCATION/TRAINING PROGRAM

## 2024-05-17 PROCEDURE — A4217 STERILE WATER/SALINE, 500 ML: HCPCS | Performed by: STUDENT IN AN ORGANIZED HEALTH CARE EDUCATION/TRAINING PROGRAM

## 2024-05-17 PROCEDURE — 2500000001 HC RX 250 WO HCPCS SELF ADMINISTERED DRUGS (ALT 637 FOR MEDICARE OP): Performed by: STUDENT IN AN ORGANIZED HEALTH CARE EDUCATION/TRAINING PROGRAM

## 2024-05-17 PROCEDURE — 2500000005 HC RX 250 GENERAL PHARMACY W/O HCPCS: Performed by: NURSE ANESTHETIST, CERTIFIED REGISTERED

## 2024-05-17 PROCEDURE — 3600000004 HC OR TIME - INITIAL BASE CHARGE - PROCEDURE LEVEL FOUR: Performed by: STUDENT IN AN ORGANIZED HEALTH CARE EDUCATION/TRAINING PROGRAM

## 2024-05-17 PROCEDURE — 2500000004 HC RX 250 GENERAL PHARMACY W/ HCPCS (ALT 636 FOR OP/ED): Performed by: STUDENT IN AN ORGANIZED HEALTH CARE EDUCATION/TRAINING PROGRAM

## 2024-05-17 PROCEDURE — 3700000001 HC GENERAL ANESTHESIA TIME - INITIAL BASE CHARGE: Performed by: STUDENT IN AN ORGANIZED HEALTH CARE EDUCATION/TRAINING PROGRAM

## 2024-05-17 RX ORDER — DIPHENHYDRAMINE HYDROCHLORIDE 50 MG/ML
12.5 INJECTION INTRAMUSCULAR; INTRAVENOUS ONCE AS NEEDED
Status: DISCONTINUED | OUTPATIENT
Start: 2024-05-17 | End: 2024-05-17 | Stop reason: HOSPADM

## 2024-05-17 RX ORDER — GABAPENTIN 300 MG/1
300 CAPSULE ORAL 3 TIMES DAILY
Status: DISCONTINUED | OUTPATIENT
Start: 2024-05-17 | End: 2024-05-23 | Stop reason: HOSPADM

## 2024-05-17 RX ORDER — BUPIVACAINE HYDROCHLORIDE 5 MG/ML
INJECTION, SOLUTION EPIDURAL; INTRACAUDAL AS NEEDED
Status: DISCONTINUED | OUTPATIENT
Start: 2024-05-17 | End: 2024-05-17 | Stop reason: HOSPADM

## 2024-05-17 RX ORDER — SODIUM CHLORIDE, SODIUM GLUCONATE, SODIUM ACETATE, POTASSIUM CHLORIDE AND MAGNESIUM CHLORIDE 30; 37; 368; 526; 502 MG/100ML; MG/100ML; MG/100ML; MG/100ML; MG/100ML
INJECTION, SOLUTION INTRAVENOUS CONTINUOUS PRN
Status: DISCONTINUED | OUTPATIENT
Start: 2024-05-17 | End: 2024-05-17

## 2024-05-17 RX ORDER — DEXAMETHASONE SODIUM PHOSPHATE 10 MG/ML
INJECTION INTRAMUSCULAR; INTRAVENOUS AS NEEDED
Status: DISCONTINUED | OUTPATIENT
Start: 2024-05-17 | End: 2024-05-17

## 2024-05-17 RX ORDER — LABETALOL HYDROCHLORIDE 5 MG/ML
5 INJECTION, SOLUTION INTRAVENOUS
Status: DISCONTINUED | OUTPATIENT
Start: 2024-05-17 | End: 2024-05-17 | Stop reason: HOSPADM

## 2024-05-17 RX ORDER — HYDROMORPHONE HYDROCHLORIDE 1 MG/ML
1 INJECTION, SOLUTION INTRAMUSCULAR; INTRAVENOUS; SUBCUTANEOUS EVERY 5 MIN PRN
Status: DISCONTINUED | OUTPATIENT
Start: 2024-05-17 | End: 2024-05-17 | Stop reason: HOSPADM

## 2024-05-17 RX ORDER — ONDANSETRON HYDROCHLORIDE 2 MG/ML
INJECTION, SOLUTION INTRAVENOUS AS NEEDED
Status: DISCONTINUED | OUTPATIENT
Start: 2024-05-17 | End: 2024-05-17

## 2024-05-17 RX ORDER — ROCURONIUM BROMIDE 10 MG/ML
INJECTION, SOLUTION INTRAVENOUS AS NEEDED
Status: DISCONTINUED | OUTPATIENT
Start: 2024-05-17 | End: 2024-05-17

## 2024-05-17 RX ORDER — CEFAZOLIN 1 G/1
INJECTION, POWDER, FOR SOLUTION INTRAVENOUS AS NEEDED
Status: DISCONTINUED | OUTPATIENT
Start: 2024-05-17 | End: 2024-05-17

## 2024-05-17 RX ORDER — ACETAMINOPHEN 325 MG/1
650 TABLET ORAL EVERY 6 HOURS
Status: DISCONTINUED | OUTPATIENT
Start: 2024-05-17 | End: 2024-05-23 | Stop reason: HOSPADM

## 2024-05-17 RX ORDER — LIDOCAINE HYDROCHLORIDE 20 MG/ML
INJECTION, SOLUTION INFILTRATION; PERINEURAL AS NEEDED
Status: DISCONTINUED | OUTPATIENT
Start: 2024-05-17 | End: 2024-05-17

## 2024-05-17 RX ORDER — ENOXAPARIN SODIUM 100 MG/ML
40 INJECTION SUBCUTANEOUS EVERY 24 HOURS
Status: DISCONTINUED | OUTPATIENT
Start: 2024-05-18 | End: 2024-05-23 | Stop reason: HOSPADM

## 2024-05-17 RX ORDER — HYDROMORPHONE HYDROCHLORIDE 1 MG/ML
INJECTION, SOLUTION INTRAMUSCULAR; INTRAVENOUS; SUBCUTANEOUS AS NEEDED
Status: DISCONTINUED | OUTPATIENT
Start: 2024-05-17 | End: 2024-05-17

## 2024-05-17 RX ORDER — METRONIDAZOLE 500 MG/100ML
INJECTION, SOLUTION INTRAVENOUS AS NEEDED
Status: DISCONTINUED | OUTPATIENT
Start: 2024-05-17 | End: 2024-05-17

## 2024-05-17 RX ORDER — METOCLOPRAMIDE HYDROCHLORIDE 5 MG/ML
10 INJECTION INTRAMUSCULAR; INTRAVENOUS ONCE AS NEEDED
Status: DISCONTINUED | OUTPATIENT
Start: 2024-05-17 | End: 2024-05-17 | Stop reason: HOSPADM

## 2024-05-17 RX ORDER — NAPROXEN SODIUM 220 MG/1
81 TABLET, FILM COATED ORAL DAILY
Status: DISCONTINUED | OUTPATIENT
Start: 2024-05-18 | End: 2024-05-23 | Stop reason: HOSPADM

## 2024-05-17 RX ORDER — MIDAZOLAM HYDROCHLORIDE 1 MG/ML
2 INJECTION, SOLUTION INTRAMUSCULAR; INTRAVENOUS ONCE
Status: DISCONTINUED | OUTPATIENT
Start: 2024-05-17 | End: 2024-05-17 | Stop reason: HOSPADM

## 2024-05-17 RX ORDER — SODIUM CHLORIDE 0.9 G/100ML
IRRIGANT IRRIGATION AS NEEDED
Status: DISCONTINUED | OUTPATIENT
Start: 2024-05-17 | End: 2024-05-17 | Stop reason: HOSPADM

## 2024-05-17 RX ORDER — DEXTROSE MONOHYDRATE AND SODIUM CHLORIDE 5; .45 G/100ML; G/100ML
40 INJECTION, SOLUTION INTRAVENOUS CONTINUOUS
Status: DISCONTINUED | OUTPATIENT
Start: 2024-05-17 | End: 2024-05-18

## 2024-05-17 RX ORDER — PHENYLEPHRINE HCL IN 0.9% NACL 0.4MG/10ML
SYRINGE (ML) INTRAVENOUS AS NEEDED
Status: DISCONTINUED | OUTPATIENT
Start: 2024-05-17 | End: 2024-05-17

## 2024-05-17 RX ORDER — PROPOFOL 10 MG/ML
INJECTION, EMULSION INTRAVENOUS AS NEEDED
Status: DISCONTINUED | OUTPATIENT
Start: 2024-05-17 | End: 2024-05-17

## 2024-05-17 RX ORDER — MAGNESIUM SULFATE HEPTAHYDRATE 500 MG/ML
INJECTION, SOLUTION INTRAMUSCULAR; INTRAVENOUS AS NEEDED
Status: DISCONTINUED | OUTPATIENT
Start: 2024-05-17 | End: 2024-05-17

## 2024-05-17 RX ORDER — METOPROLOL SUCCINATE 25 MG/1
25 TABLET, EXTENDED RELEASE ORAL DAILY
Status: DISCONTINUED | OUTPATIENT
Start: 2024-05-18 | End: 2024-05-23 | Stop reason: HOSPADM

## 2024-05-17 RX ORDER — ONDANSETRON 4 MG/1
4 TABLET, ORALLY DISINTEGRATING ORAL EVERY 8 HOURS PRN
Status: DISCONTINUED | OUTPATIENT
Start: 2024-05-17 | End: 2024-05-23 | Stop reason: HOSPADM

## 2024-05-17 RX ORDER — SODIUM CHLORIDE, SODIUM LACTATE, POTASSIUM CHLORIDE, CALCIUM CHLORIDE 600; 310; 30; 20 MG/100ML; MG/100ML; MG/100ML; MG/100ML
INJECTION, SOLUTION INTRAVENOUS CONTINUOUS PRN
Status: DISCONTINUED | OUTPATIENT
Start: 2024-05-17 | End: 2024-05-17

## 2024-05-17 RX ORDER — ALBUTEROL SULFATE 0.83 MG/ML
2.5 SOLUTION RESPIRATORY (INHALATION)
Status: DISCONTINUED | OUTPATIENT
Start: 2024-05-17 | End: 2024-05-17 | Stop reason: HOSPADM

## 2024-05-17 RX ORDER — NALOXONE HYDROCHLORIDE 0.4 MG/ML
0.2 INJECTION, SOLUTION INTRAMUSCULAR; INTRAVENOUS; SUBCUTANEOUS AS NEEDED
Status: DISCONTINUED | OUTPATIENT
Start: 2024-05-17 | End: 2024-05-23 | Stop reason: HOSPADM

## 2024-05-17 RX ORDER — FENTANYL CITRATE 50 UG/ML
INJECTION, SOLUTION INTRAMUSCULAR; INTRAVENOUS AS NEEDED
Status: DISCONTINUED | OUTPATIENT
Start: 2024-05-17 | End: 2024-05-17

## 2024-05-17 RX ORDER — LABETALOL HYDROCHLORIDE 5 MG/ML
INJECTION, SOLUTION INTRAVENOUS AS NEEDED
Status: DISCONTINUED | OUTPATIENT
Start: 2024-05-17 | End: 2024-05-17

## 2024-05-17 RX ORDER — MIDAZOLAM HYDROCHLORIDE 1 MG/ML
INJECTION, SOLUTION INTRAMUSCULAR; INTRAVENOUS AS NEEDED
Status: DISCONTINUED | OUTPATIENT
Start: 2024-05-17 | End: 2024-05-17

## 2024-05-17 RX ORDER — PANTOPRAZOLE SODIUM 40 MG/1
40 TABLET, DELAYED RELEASE ORAL
Status: DISCONTINUED | OUTPATIENT
Start: 2024-05-18 | End: 2024-05-23 | Stop reason: HOSPADM

## 2024-05-17 RX ORDER — KETOROLAC TROMETHAMINE 30 MG/ML
INJECTION, SOLUTION INTRAMUSCULAR; INTRAVENOUS AS NEEDED
Status: DISCONTINUED | OUTPATIENT
Start: 2024-05-17 | End: 2024-05-17

## 2024-05-17 RX ORDER — SODIUM CHLORIDE, SODIUM LACTATE, POTASSIUM CHLORIDE, CALCIUM CHLORIDE 600; 310; 30; 20 MG/100ML; MG/100ML; MG/100ML; MG/100ML
100 INJECTION, SOLUTION INTRAVENOUS CONTINUOUS
Status: DISCONTINUED | OUTPATIENT
Start: 2024-05-17 | End: 2024-05-17 | Stop reason: HOSPADM

## 2024-05-17 RX ORDER — HYDROCODONE BITARTRATE AND ACETAMINOPHEN 5; 325 MG/1; MG/1
1 TABLET ORAL EVERY 4 HOURS PRN
Status: DISCONTINUED | OUTPATIENT
Start: 2024-05-17 | End: 2024-05-17 | Stop reason: HOSPADM

## 2024-05-17 RX ORDER — HYDRALAZINE HYDROCHLORIDE 20 MG/ML
5 INJECTION INTRAMUSCULAR; INTRAVENOUS EVERY 30 MIN PRN
Status: DISCONTINUED | OUTPATIENT
Start: 2024-05-17 | End: 2024-05-17 | Stop reason: HOSPADM

## 2024-05-17 RX ORDER — HYDROMORPHONE HCL/0.9% NACL/PF 15 MG/30ML
PATIENT CONTROLLED ANALGESIA SYRINGE INTRAVENOUS CONTINUOUS
Status: DISCONTINUED | OUTPATIENT
Start: 2024-05-17 | End: 2024-05-19

## 2024-05-17 RX ORDER — ONDANSETRON HYDROCHLORIDE 2 MG/ML
4 INJECTION, SOLUTION INTRAVENOUS EVERY 8 HOURS PRN
Status: DISCONTINUED | OUTPATIENT
Start: 2024-05-17 | End: 2024-05-23 | Stop reason: HOSPADM

## 2024-05-17 RX ADMIN — HYDROMORPHONE HYDROCHLORIDE 0.25 MG: 1 INJECTION, SOLUTION INTRAMUSCULAR; INTRAVENOUS; SUBCUTANEOUS at 10:55

## 2024-05-17 RX ADMIN — Medication 200 MCG: at 09:46

## 2024-05-17 RX ADMIN — ROCURONIUM BROMIDE 20 MG: 10 INJECTION INTRAVENOUS at 11:37

## 2024-05-17 RX ADMIN — LIDOCAINE HYDROCHLORIDE 60 MG: 20 INJECTION, SOLUTION INFILTRATION; PERINEURAL at 09:08

## 2024-05-17 RX ADMIN — GABAPENTIN 300 MG: 300 CAPSULE ORAL at 20:24

## 2024-05-17 RX ADMIN — ONDANSETRON 4 MG: 2 INJECTION INTRAMUSCULAR; INTRAVENOUS at 14:50

## 2024-05-17 RX ADMIN — SODIUM CHLORIDE, SODIUM GLUCONATE, SODIUM ACETATE, POTASSIUM CHLORIDE AND MAGNESIUM CHLORIDE: 526; 502; 368; 37; 30 INJECTION, SOLUTION INTRAVENOUS at 09:25

## 2024-05-17 RX ADMIN — LABETALOL HYDROCHLORIDE 5 MG: 5 INJECTION, SOLUTION INTRAVENOUS at 14:14

## 2024-05-17 RX ADMIN — SODIUM CHLORIDE, SODIUM GLUCONATE, SODIUM ACETATE, POTASSIUM CHLORIDE AND MAGNESIUM CHLORIDE: 526; 502; 368; 37; 30 INJECTION, SOLUTION INTRAVENOUS at 12:20

## 2024-05-17 RX ADMIN — HYDROMORPHONE HYDROCHLORIDE 0.5 MG: 1 INJECTION, SOLUTION INTRAMUSCULAR; INTRAVENOUS; SUBCUTANEOUS at 11:01

## 2024-05-17 RX ADMIN — ROCURONIUM BROMIDE 30 MG: 10 INJECTION INTRAVENOUS at 10:14

## 2024-05-17 RX ADMIN — CEFAZOLIN 2 G: 1 INJECTION, POWDER, FOR SOLUTION INTRAMUSCULAR; INTRAVENOUS at 13:39

## 2024-05-17 RX ADMIN — ACETAMINOPHEN 650 MG: 325 TABLET ORAL at 23:52

## 2024-05-17 RX ADMIN — SODIUM CHLORIDE, POTASSIUM CHLORIDE, SODIUM LACTATE AND CALCIUM CHLORIDE: 600; 310; 30; 20 INJECTION, SOLUTION INTRAVENOUS at 08:38

## 2024-05-17 RX ADMIN — HYDROMORPHONE HYDROCHLORIDE: 10 INJECTION, SOLUTION INTRAMUSCULAR; INTRAVENOUS; SUBCUTANEOUS at 16:32

## 2024-05-17 RX ADMIN — MAGNESIUM SULFATE HEPTAHYDRATE 2 G: 500 INJECTION, SOLUTION INTRAMUSCULAR; INTRAVENOUS at 09:36

## 2024-05-17 RX ADMIN — HYDROMORPHONE HYDROCHLORIDE 0.25 MG: 1 INJECTION, SOLUTION INTRAMUSCULAR; INTRAVENOUS; SUBCUTANEOUS at 14:14

## 2024-05-17 RX ADMIN — KETOROLAC TROMETHAMINE 15 MG: 30 INJECTION, SOLUTION INTRAMUSCULAR at 15:07

## 2024-05-17 RX ADMIN — MIDAZOLAM 2 MG: 1 INJECTION INTRAMUSCULAR; INTRAVENOUS at 09:01

## 2024-05-17 RX ADMIN — ROCURONIUM BROMIDE 50 MG: 10 INJECTION INTRAVENOUS at 09:08

## 2024-05-17 RX ADMIN — CEFAZOLIN 2 G: 1 INJECTION, POWDER, FOR SOLUTION INTRAMUSCULAR; INTRAVENOUS at 09:40

## 2024-05-17 RX ADMIN — FENTANYL CITRATE 50 MCG: 50 INJECTION, SOLUTION INTRAMUSCULAR; INTRAVENOUS at 09:47

## 2024-05-17 RX ADMIN — ONDANSETRON 4 MG: 2 INJECTION INTRAMUSCULAR; INTRAVENOUS at 09:36

## 2024-05-17 RX ADMIN — LABETALOL HYDROCHLORIDE 5 MG: 5 INJECTION, SOLUTION INTRAVENOUS at 11:46

## 2024-05-17 RX ADMIN — Medication 30 MG: at 09:47

## 2024-05-17 RX ADMIN — HYDROMORPHONE HYDROCHLORIDE 0.25 MG: 1 INJECTION, SOLUTION INTRAMUSCULAR; INTRAVENOUS; SUBCUTANEOUS at 14:50

## 2024-05-17 RX ADMIN — LABETALOL HYDROCHLORIDE 5 MG: 5 INJECTION, SOLUTION INTRAVENOUS at 11:15

## 2024-05-17 RX ADMIN — PROPOFOL 50 MG: 10 INJECTION, EMULSION INTRAVENOUS at 10:14

## 2024-05-17 RX ADMIN — ROCURONIUM BROMIDE 20 MG: 10 INJECTION INTRAVENOUS at 13:36

## 2024-05-17 RX ADMIN — Medication 200 MCG: at 13:39

## 2024-05-17 RX ADMIN — ROCURONIUM BROMIDE 20 MG: 10 INJECTION INTRAVENOUS at 12:50

## 2024-05-17 RX ADMIN — Medication 20 MG: at 10:37

## 2024-05-17 RX ADMIN — HYDROMORPHONE HYDROCHLORIDE 0.5 MG: 1 INJECTION, SOLUTION INTRAMUSCULAR; INTRAVENOUS; SUBCUTANEOUS at 15:06

## 2024-05-17 RX ADMIN — DEXTROSE AND SODIUM CHLORIDE 40 ML/HR: 5; 450 INJECTION, SOLUTION INTRAVENOUS at 18:11

## 2024-05-17 RX ADMIN — Medication 100 MCG: at 09:43

## 2024-05-17 RX ADMIN — PROPOFOL 150 MG: 10 INJECTION, EMULSION INTRAVENOUS at 09:08

## 2024-05-17 RX ADMIN — DEXAMETHASONE SODIUM PHOSPHATE 10 MG: 10 INJECTION, SOLUTION INTRAMUSCULAR; INTRAVENOUS at 09:36

## 2024-05-17 RX ADMIN — SODIUM CHLORIDE, POTASSIUM CHLORIDE, SODIUM LACTATE AND CALCIUM CHLORIDE: 600; 310; 30; 20 INJECTION, SOLUTION INTRAVENOUS at 11:31

## 2024-05-17 RX ADMIN — HYDROMORPHONE HYDROCHLORIDE 0.25 MG: 1 INJECTION, SOLUTION INTRAMUSCULAR; INTRAVENOUS; SUBCUTANEOUS at 10:32

## 2024-05-17 RX ADMIN — FENTANYL CITRATE 50 MCG: 50 INJECTION, SOLUTION INTRAMUSCULAR; INTRAVENOUS at 09:08

## 2024-05-17 RX ADMIN — METRONIDAZOLE 500 MG: 500 INJECTION, SOLUTION INTRAVENOUS at 09:36

## 2024-05-17 RX ADMIN — LABETALOL HYDROCHLORIDE 5 MG: 5 INJECTION, SOLUTION INTRAVENOUS at 12:33

## 2024-05-17 SDOH — SOCIAL STABILITY: SOCIAL INSECURITY: HAS ANYONE EVER THREATENED TO HURT YOUR FAMILY OR YOUR PETS?: NO

## 2024-05-17 SDOH — SOCIAL STABILITY: SOCIAL INSECURITY: ARE THERE ANY APPARENT SIGNS OF INJURIES/BEHAVIORS THAT COULD BE RELATED TO ABUSE/NEGLECT?: NO

## 2024-05-17 SDOH — SOCIAL STABILITY: SOCIAL INSECURITY: WERE YOU ABLE TO COMPLETE ALL THE BEHAVIORAL HEALTH SCREENINGS?: YES

## 2024-05-17 SDOH — HEALTH STABILITY: MENTAL HEALTH: CURRENT SMOKER: 0

## 2024-05-17 SDOH — SOCIAL STABILITY: SOCIAL INSECURITY: ABUSE: ADULT

## 2024-05-17 SDOH — SOCIAL STABILITY: SOCIAL INSECURITY: DO YOU FEEL UNSAFE GOING BACK TO THE PLACE WHERE YOU ARE LIVING?: NO

## 2024-05-17 SDOH — SOCIAL STABILITY: SOCIAL INSECURITY: HAVE YOU HAD THOUGHTS OF HARMING ANYONE ELSE?: NO

## 2024-05-17 SDOH — SOCIAL STABILITY: SOCIAL INSECURITY: DO YOU FEEL ANYONE HAS EXPLOITED OR TAKEN ADVANTAGE OF YOU FINANCIALLY OR OF YOUR PERSONAL PROPERTY?: NO

## 2024-05-17 SDOH — SOCIAL STABILITY: SOCIAL INSECURITY: DOES ANYONE TRY TO KEEP YOU FROM HAVING/CONTACTING OTHER FRIENDS OR DOING THINGS OUTSIDE YOUR HOME?: NO

## 2024-05-17 SDOH — SOCIAL STABILITY: SOCIAL INSECURITY: ARE YOU OR HAVE YOU BEEN THREATENED OR ABUSED PHYSICALLY, EMOTIONALLY, OR SEXUALLY BY ANYONE?: NO

## 2024-05-17 ASSESSMENT — PAIN - FUNCTIONAL ASSESSMENT
PAIN_FUNCTIONAL_ASSESSMENT: 0-10
PAIN_FUNCTIONAL_ASSESSMENT: UNABLE TO SELF-REPORT
PAIN_FUNCTIONAL_ASSESSMENT: 0-10

## 2024-05-17 ASSESSMENT — ACTIVITIES OF DAILY LIVING (ADL)
JUDGMENT_ADEQUATE_SAFELY_COMPLETE_DAILY_ACTIVITIES: YES
GROOMING: INDEPENDENT
FEEDING YOURSELF: INDEPENDENT
LACK_OF_TRANSPORTATION: PATIENT DECLINED
HEARING - LEFT EAR: FUNCTIONAL
HEARING - RIGHT EAR: FUNCTIONAL
TOILETING: INDEPENDENT
PATIENT'S MEMORY ADEQUATE TO SAFELY COMPLETE DAILY ACTIVITIES?: YES
ADEQUATE_TO_COMPLETE_ADL: YES
DRESSING YOURSELF: INDEPENDENT
WALKS IN HOME: INDEPENDENT
BATHING: INDEPENDENT

## 2024-05-17 ASSESSMENT — PATIENT HEALTH QUESTIONNAIRE - PHQ9
1. LITTLE INTEREST OR PLEASURE IN DOING THINGS: NOT AT ALL
2. FEELING DOWN, DEPRESSED OR HOPELESS: NOT AT ALL
SUM OF ALL RESPONSES TO PHQ9 QUESTIONS 1 & 2: 0

## 2024-05-17 ASSESSMENT — PAIN SCALES - GENERAL
PAINLEVEL_OUTOF10: 0 - NO PAIN
PAINLEVEL_OUTOF10: 0 - NO PAIN
PAINLEVEL_OUTOF10: 2
PAINLEVEL_OUTOF10: 5 - MODERATE PAIN
PAINLEVEL_OUTOF10: 0 - NO PAIN

## 2024-05-17 ASSESSMENT — COGNITIVE AND FUNCTIONAL STATUS - GENERAL
DAILY ACTIVITIY SCORE: 24
MOBILITY SCORE: 24
PATIENT BASELINE BEDBOUND: NO

## 2024-05-17 ASSESSMENT — LIFESTYLE VARIABLES
SKIP TO QUESTIONS 9-10: 1
AUDIT-C TOTAL SCORE: 0
HOW MANY STANDARD DRINKS CONTAINING ALCOHOL DO YOU HAVE ON A TYPICAL DAY: PATIENT DOES NOT DRINK
HOW OFTEN DO YOU HAVE A DRINK CONTAINING ALCOHOL: NEVER
HOW OFTEN DO YOU HAVE 6 OR MORE DRINKS ON ONE OCCASION: NEVER
AUDIT-C TOTAL SCORE: 0

## 2024-05-17 ASSESSMENT — PAIN DESCRIPTION - DESCRIPTORS: DESCRIPTORS: SORE

## 2024-05-17 NOTE — BRIEF OP NOTE
"Date: 2024  OR Location: [facility] OR    Name: Janis Ibanez \"Marquita\", : 1955, Age: 68 y.o., MRN: [id number], Sex: female    Diagnosis  Pre-op Diagnosis     * Rectal cancer (Multi) [C20] Post-op Diagnosis     * Rectal cancer (Multi) [C20]     Procedures  ROBOTIC CONVERTED TO OPEN LOWER ANTERIOR RESECTION, WITH LOOP ILEOSTOMY, FLEXIBLE SIGMOIDOSOPY, MOBILIZATION OF SPLENIC FLEXURE  [id number] - CO LAP,SURG,COLECTOMY, PARTIAL, W/ANAST      Surgeons      * Gustavo Harp - Primary    Resident/Fellow/Other Assistant:  Surgeons and Role:     * Iraida Lewis MD - Assisting    Procedure Summary  Anesthesia: General  ASA: III  Anesthesia Staff: Anesthesiologist: Scottie Castillo MD  CRNA: SHARMILA Feliciano-CRNA  Estimated Blood Loss: 200 mL  Intra-op Medications:   Administrations occurring from 0910 to 1500 on 24:   Medication Name Total Dose   bupivacaine PF (Marcaine) 0.5 % (5 mg/mL) injection 10 mL   sodium chloride 0.9 % irrigation solution 1,000 mL              Anesthesia Record               Intraprocedure I/O Totals          Intake    electrolyte-A (Plasmalyte-A) 1500.00 mL    LR infusion 2000.00 mL    Total Intake 3500 mL       Output    Urine 300 mL    Est. Blood Loss 150 mL    Total Output 450 mL       Net    Net Volume 3050 mL          Specimen:   ID Type Source Tests Collected by Time   1 : LOWER ANTERIOR RESECTION AND ANASTAMOTIC DONUTS Tissue COLON - SIGMOID RESECTION SURGICAL PATHOLOGY EXAM Gustavo Harp MD 2024 1219        Staff:   Circulator: Jillian Lara RN  Relief Circulator: Kandice Peña RN  Relief Scrub: Ann Elena  Scrub Person: Susan Rajput; Bartolo Clemons          Findings: Adhesions and pelvic fibrosis, stabled end to end anastomosis with intact donuts and negative leak test    Complications:  None; patient tolerated the procedure well.     Disposition: PACU - hemodynamically stable.  Condition: stable  Specimens Collected:   ID Type Source Tests " Collected by Time   1 : LOWER ANTERIOR RESECTION AND ANASTAMOTIC DONUTS Tissue COLON - SIGMOID RESECTION SURGICAL PATHOLOGY EXAM Gustavo Harp MD 5/17/2024 1219     Attending Attestation: I was present and scrubbed for the entire procedure.    Gustavo Harp  Phone Number: [phone]

## 2024-05-17 NOTE — ANESTHESIA PROCEDURE NOTES
Airway  Date/Time: 5/17/2024 9:10 AM  Urgency: elective      Staffing  Performed: CRNA   Authorized by: Scottie Castillo MD    Performed by: SHARMILA Feliciano-SHANNA  Patient location during procedure: OR    Indications and Patient Condition  Indications for airway management: anesthesia  Spontaneous Ventilation: absent  Sedation level: deep  Preoxygenated: yes  Patient position: sniffing  MILS maintained throughout  Mask difficulty assessment: 1 - vent by mask    Final Airway Details  Final airway type: endotracheal airway      Successful airway: ETT  Cuffed: yes   Successful intubation technique: direct laryngoscopy  Endotracheal tube insertion site: oral  Blade: Trupti  Blade size: #3  ETT size (mm): 7.0  Cormack-Lehane Classification: grade I - full view of glottis  Placement verified by: chest auscultation and capnometry   Cuff volume (mL): 5  Measured from: lips  ETT to lips (cm): 21  Number of attempts at approach: 1  Number of other approaches attempted: 0

## 2024-05-17 NOTE — ANESTHESIA PROCEDURE NOTES
Arterial Line:    Date/Time: 5/17/2024 9:18 AM    Staffing  Performed: CRNA   Authorized by: Scottie Castillo MD    Performed by: SHARMILA Feliciano-CRNA    An arterial line was placed. Procedure performed using surface landmarks.in the OR for the following indication(s): continuous blood pressure monitoring.    A 20 gauge (size) (length), Arrow (type) catheter was placed into the Right radial artery, secured by tape,   Seldinger technique used.  Events:  patient tolerated procedure well with no complications.

## 2024-05-17 NOTE — ANESTHESIA PREPROCEDURE EVALUATION
"Patient: Janis Ibanez \"Marquita\"    Procedure Information       Date/Time: 05/17/24 0910    Procedure: Robotic or Laparoscopic possible open Low Anterior Resection, Possible stoma    Location: [facility] OR 08 / [facility]    Surgeons: Gustavo Harp MD            Relevant Problems   Cardiac   (+) CAD S/P percutaneous coronary angioplasty   (+) MI (myocardial infarction) (Multi)   (+) Mixed hyperlipidemia      GI   (+) Cancer of sigmoid colon (Multi)   (+) Malignant neoplasm of rectal ampulla (Multi)   (+) Rectal cancer (Multi)      Liver   (+) Cancer of sigmoid colon (Multi)   (+) Malignant neoplasm of rectal ampulla (Multi)   (+) Rectal cancer (Multi)      GYN   (+) Malignant neoplasm of female breast (Multi)       Clinical information reviewed:   Tobacco  Allergies  Meds   Med Hx  Surg Hx   Fam Hx  Soc Hx        NPO Detail:  NPO/Void Status  Carbohydrate Drink Given Prior to Surgery? : N  Date of Last Liquid: 05/15/24  Time of Last Liquid: 0800  Date of Last Solid: 05/16/24  Time of Last Solid: 2300  Last Intake Type: Clear fluids  Time of Last Void: 0808         Physical Exam    Airway  Mallampati: II  TM distance: >3 FB  Neck ROM: full     Cardiovascular - normal exam     Dental   (+) upper dentures, lower dentures     Pulmonary - normal exam     Abdominal - normal exam         There were no vitals filed for this visit.    Past Surgical History:   Procedure Laterality Date    ANGIOPLASTY      ANUS SURGERY      APPENDECTOMY      CHOLECYSTECTOMY      COLONOSCOPY      CORONARY ANGIOPLASTY WITH STENT PLACEMENT      FLEXIBLE SIGMOIDOSCOPY      HYSTERECTOMY      LAPAROSCOPY DIAGNOSTIC / BIOPSY / ASPIRATION / LYSIS      10/27/2023 noted bulk disease    PELVIC LAPAROSCOPY       Past Medical History:   Diagnosis Date    Cholelithiasis     Coronary artery disease     High cholesterol     History of heart attack 2015    History of rectal or anal cancer     20 yrs ago    Hypertension     Presence of dental prosthetic " device (complete) (partial)     upper and lower partail    Psoriasis     Rectal cancer (Multi)     Wears glasses      No current facility-administered medications for this encounter.    Facility-Administered Medications Ordered in Other Encounters:     lactated Ringer's infusion, , intravenous, Continuous PRN, SHARMILA Feliciano-CRNA, New Bag at 05/17/24 0838  Prior to Admission medications    Medication Sig Start Date End Date Taking? Authorizing Provider   chlorhexidine (Peridex) 0.12 % solution Sig: 15 mls swish and spit the night before surgery and 15mls swish and spit the morning of surgery do not swallow 5/8/24  Yes SHARMILA Carter-CNP   cyanocobalamin, vitamin B-12, (VITAMIN B-12 ORAL) Take 1,000 mcg by mouth once daily at bedtime.   Yes Historical Provider, MD   docusate sodium (Colace) 100 mg capsule Take 1 capsule (100 mg) by mouth once daily at bedtime.   Yes Historical Provider, MD   gabapentin (Neurontin) 100 mg capsule Take one capsule by mouth at bedtime starting 3 days before surgery 5/1/24  Yes Gustavo Harp MD   lidocaine (Xylocaine) 5 % ointment Apply topically if needed for mild pain (1 - 3).   Yes Historical Provider, MD   lisinopril 10 mg tablet Take 1 tablet (10 mg) by mouth 2 times a day. 2/13/24 2/12/25 Yes Kolton Lam MD   metoprolol succinate XL (Toprol-XL) 25 mg 24 hr tablet Take 1 tablet (25 mg) by mouth once daily. Do not crush or chew. 2/13/24 2/12/25 Yes Kolton Lam MD   metroNIDAZOLE (Flagyl) 250 mg tablet Take one tablet by mouth at 6:00pm, 7:00pm, 11:00pm on the day prior to surgery 5/1/24  Yes Gustavo Harp MD   neomycin (Mycifradin) 500 mg tablet Take two tablets (1000mg) by mouth at 6:00pm, 7:00pm, and 11:00pm on the day prior to surgery 5/1/24  Yes Gustavo Harp MD   omeprazole (PriLOSEC) 20 mg DR capsule Take 1 capsule (20 mg) by mouth once daily. Do not crush or chew.   Yes Historical Provider, MD   prochlorperazine (Compazine) 5 mg tablet Take 1 tablet (5  "mg) by mouth every 6 hours if needed for nausea or vomiting.   Yes Historical Provider, MD   rosuvastatin (Crestor) 5 mg tablet Take 1 tablet (5 mg) by mouth once daily.  Patient taking differently: Take 1 tablet (5 mg) by mouth once daily at bedtime. 2/13/24 2/12/25 Yes Kolton Lam MD   aspirin 81 mg chewable tablet Chew 1 tablet (81 mg) once daily. 2/13/24 2/12/25  Kolton Lam MD     No Known Allergies  Social History     Tobacco Use    Smoking status: Every Day     Current packs/day: 0.25     Average packs/day: 0.3 packs/day for 52.4 years (13.1 ttl pk-yrs)     Types: Cigarettes     Start date: 1972    Smokeless tobacco: Never   Substance Use Topics    Alcohol use: Not Currently         Chemistry    Lab Results   Component Value Date/Time     05/08/2024 0935    K 5.0 05/08/2024 0935     05/08/2024 0935    CO2 27 05/08/2024 0935    BUN 12 05/08/2024 0935    CREATININE 0.76 05/08/2024 0935    Lab Results   Component Value Date/Time    CALCIUM 10.3 05/08/2024 0935          No results found for: \"WBC\", \"HGB\", \"HCT\", \"PLT\"  No results found for: \"PROTIME\", \"PTT\", \"INR\"  Encounter Date: 05/08/24   ECG 12 lead   Result Value    Ventricular Rate 57    Atrial Rate 57    IN Interval 136    QRS Duration 72    QT Interval 416    QTC Calculation(Bazett) 404    P Axis 17    R Axis -8    T Axis 24    QRS Count 10    Q Onset 220    P Onset 152    P Offset 201    T Offset 428    QTC Fredericia 409    Narrative    Sinus bradycardia with sinus arrhythmia  Otherwise normal ECG  When compared with ECG of 02-AUG-2005 13:53,  No significant change was found  Confirmed by Perez Lombardo ([id number]) on 5/9/2024 3:02:19 PM        Anesthesia Plan    History of general anesthesia?: yes  History of complications of general anesthesia?: no    ASA 3     general   (Invasive BP monitoring)  The patient is not a current smoker.    intravenous induction   Anesthetic plan and risks discussed with patient.    Plan discussed with " CRNA.

## 2024-05-17 NOTE — ANESTHESIA POSTPROCEDURE EVALUATION
"Patient: Janis Ibanez \"Marquita\"    Procedure Summary       Date: 05/17/24 Room / Location: [facility] OR [facility] / [facility]    Anesthesia Start: 0903 Anesthesia Stop: 1534    Procedure: ROBOTIC CONVERTED TO OPEN LOWER ANTERIOR RESECTION, WITH LOOP ILEOSTOMY, FLEXIBLE SIGMOIDOSOPY, MOBILIZATION OF SPLENIC FLEXURE Diagnosis:       Rectal cancer (Multi)      (Rectal cancer (Multi) [C20])    Surgeons: Gustavo Harp MD Responsible Provider: Scottie Castillo MD    Anesthesia Type: general ASA Status: 3            Anesthesia Type: general    Vitals Value Taken Time   /76 05/17/24 1530   Temp 36.4 05/17/24 1534   Pulse 70 05/17/24 1532   Resp 13 05/17/24 1532   SpO2 97 % 05/17/24 1532   Vitals shown include unfiled device data.    Anesthesia Post Evaluation    Patient location during evaluation: PACU  Patient participation: waiting for patient participation  Level of consciousness: awake  Pain management: adequate  Airway patency: patent  Cardiovascular status: acceptable  Respiratory status: acceptable, face mask and oral airway  Hydration status: acceptable  Postoperative Nausea and Vomiting: none      No notable events documented.    "

## 2024-05-18 LAB
ANION GAP SERPL CALC-SCNC: 12 MMOL/L (ref 10–20)
BUN SERPL-MCNC: 17 MG/DL (ref 6–23)
CALCIUM SERPL-MCNC: 8.8 MG/DL (ref 8.6–10.3)
CHLORIDE SERPL-SCNC: 102 MMOL/L (ref 98–107)
CO2 SERPL-SCNC: 26 MMOL/L (ref 21–32)
CREAT SERPL-MCNC: 0.87 MG/DL (ref 0.5–1.05)
EGFRCR SERPLBLD CKD-EPI 2021: 74 ML/MIN/1.73M*2
ERYTHROCYTE [DISTWIDTH] IN BLOOD BY AUTOMATED COUNT: 14.5 % (ref 11.5–14.5)
GLUCOSE SERPL-MCNC: 132 MG/DL (ref 74–99)
HCT VFR BLD AUTO: 44.2 % (ref 36–46)
HGB BLD-MCNC: 13.9 G/DL (ref 12–16)
MAGNESIUM SERPL-MCNC: 2.17 MG/DL (ref 1.6–2.4)
MCH RBC QN AUTO: 28.5 PG (ref 26–34)
MCHC RBC AUTO-ENTMCNC: 31.4 G/DL (ref 32–36)
MCV RBC AUTO: 91 FL (ref 80–100)
NRBC BLD-RTO: 0 /100 WBCS (ref 0–0)
PLATELET # BLD AUTO: 259 X10*3/UL (ref 150–450)
POTASSIUM SERPL-SCNC: 4.6 MMOL/L (ref 3.5–5.3)
RBC # BLD AUTO: 4.87 X10*6/UL (ref 4–5.2)
SODIUM SERPL-SCNC: 135 MMOL/L (ref 136–145)
WBC # BLD AUTO: 17 X10*3/UL (ref 4.4–11.3)

## 2024-05-18 PROCEDURE — 99231 SBSQ HOSP IP/OBS SF/LOW 25: CPT | Performed by: STUDENT IN AN ORGANIZED HEALTH CARE EDUCATION/TRAINING PROGRAM

## 2024-05-18 PROCEDURE — 83735 ASSAY OF MAGNESIUM: CPT | Performed by: STUDENT IN AN ORGANIZED HEALTH CARE EDUCATION/TRAINING PROGRAM

## 2024-05-18 PROCEDURE — 2500000004 HC RX 250 GENERAL PHARMACY W/ HCPCS (ALT 636 FOR OP/ED): Performed by: STUDENT IN AN ORGANIZED HEALTH CARE EDUCATION/TRAINING PROGRAM

## 2024-05-18 PROCEDURE — 2500000006 HC RX 250 W HCPCS SELF ADMINISTERED DRUGS (ALT 637 FOR ALL PAYERS): Performed by: STUDENT IN AN ORGANIZED HEALTH CARE EDUCATION/TRAINING PROGRAM

## 2024-05-18 PROCEDURE — 85027 COMPLETE CBC AUTOMATED: CPT | Performed by: STUDENT IN AN ORGANIZED HEALTH CARE EDUCATION/TRAINING PROGRAM

## 2024-05-18 PROCEDURE — 80048 BASIC METABOLIC PNL TOTAL CA: CPT | Performed by: STUDENT IN AN ORGANIZED HEALTH CARE EDUCATION/TRAINING PROGRAM

## 2024-05-18 PROCEDURE — 36415 COLL VENOUS BLD VENIPUNCTURE: CPT | Performed by: STUDENT IN AN ORGANIZED HEALTH CARE EDUCATION/TRAINING PROGRAM

## 2024-05-18 PROCEDURE — 2500000001 HC RX 250 WO HCPCS SELF ADMINISTERED DRUGS (ALT 637 FOR MEDICARE OP): Performed by: STUDENT IN AN ORGANIZED HEALTH CARE EDUCATION/TRAINING PROGRAM

## 2024-05-18 PROCEDURE — 1100000001 HC PRIVATE ROOM DAILY

## 2024-05-18 RX ORDER — ROSUVASTATIN CALCIUM 5 MG/1
5 TABLET, COATED ORAL NIGHTLY
Status: DISCONTINUED | OUTPATIENT
Start: 2024-05-18 | End: 2024-05-23 | Stop reason: HOSPADM

## 2024-05-18 RX ORDER — SODIUM CHLORIDE, SODIUM LACTATE, POTASSIUM CHLORIDE, CALCIUM CHLORIDE 600; 310; 30; 20 MG/100ML; MG/100ML; MG/100ML; MG/100ML
50 INJECTION, SOLUTION INTRAVENOUS CONTINUOUS
Status: DISCONTINUED | OUTPATIENT
Start: 2024-05-18 | End: 2024-05-19

## 2024-05-18 RX ADMIN — ENOXAPARIN SODIUM 40 MG: 40 INJECTION SUBCUTANEOUS at 09:10

## 2024-05-18 RX ADMIN — ASPIRIN 81 MG 81 MG: 81 TABLET ORAL at 09:10

## 2024-05-18 RX ADMIN — SODIUM CHLORIDE, POTASSIUM CHLORIDE, SODIUM LACTATE AND CALCIUM CHLORIDE 50 ML/HR: 600; 310; 30; 20 INJECTION, SOLUTION INTRAVENOUS at 09:10

## 2024-05-18 RX ADMIN — ACETAMINOPHEN 650 MG: 325 TABLET ORAL at 23:25

## 2024-05-18 RX ADMIN — METOPROLOL SUCCINATE 25 MG: 25 TABLET, EXTENDED RELEASE ORAL at 09:10

## 2024-05-18 RX ADMIN — ROSUVASTATIN CALCIUM 5 MG: 5 TABLET, FILM COATED ORAL at 20:16

## 2024-05-18 RX ADMIN — GABAPENTIN 300 MG: 300 CAPSULE ORAL at 20:16

## 2024-05-18 RX ADMIN — GABAPENTIN 300 MG: 300 CAPSULE ORAL at 09:10

## 2024-05-18 RX ADMIN — ACETAMINOPHEN 650 MG: 325 TABLET ORAL at 18:20

## 2024-05-18 RX ADMIN — PANTOPRAZOLE SODIUM 40 MG: 40 TABLET, DELAYED RELEASE ORAL at 06:15

## 2024-05-18 RX ADMIN — ACETAMINOPHEN 650 MG: 325 TABLET ORAL at 13:06

## 2024-05-18 RX ADMIN — GABAPENTIN 300 MG: 300 CAPSULE ORAL at 14:55

## 2024-05-18 RX ADMIN — ACETAMINOPHEN 650 MG: 325 TABLET ORAL at 06:15

## 2024-05-18 ASSESSMENT — PAIN - FUNCTIONAL ASSESSMENT
PAIN_FUNCTIONAL_ASSESSMENT: 0-10

## 2024-05-18 ASSESSMENT — PAIN SCALES - GENERAL
PAINLEVEL_OUTOF10: 5 - MODERATE PAIN
PAINLEVEL_OUTOF10: 4
PAINLEVEL_OUTOF10: 5 - MODERATE PAIN
PAINLEVEL_OUTOF10: 4
PAINLEVEL_OUTOF10: 3

## 2024-05-18 ASSESSMENT — PAIN DESCRIPTION - ORIENTATION: ORIENTATION: MID

## 2024-05-18 ASSESSMENT — PAIN DESCRIPTION - LOCATION: LOCATION: ABDOMEN

## 2024-05-18 NOTE — CARE PLAN
The patient's goals for the shift include      The clinical goals for the shift include Pain management      Problem: Pain - Adult  Goal: Verbalizes/displays adequate comfort level or baseline comfort level  Outcome: Progressing     Problem: Safety - Adult  Goal: Free from fall injury  Outcome: Progressing     Problem: Pain  Goal: Takes deep breaths with improved pain control throughout the shift  Outcome: Progressing  Goal: Turns in bed with improved pain control throughout the shift  Outcome: Progressing  Goal: Walks with improved pain control throughout the shift  Outcome: Progressing  Goal: Performs ADL's with improved pain control throughout shift  Outcome: Progressing  Goal: Participates in PT with improved pain control throughout the shift  Outcome: Progressing  Goal: Free from opioid side effects throughout the shift  Outcome: Progressing  Goal: Free from acute confusion related to pain meds throughout the shift  Outcome: Progressing     Problem: Fall/Injury  Goal: Not fall by end of shift  Outcome: Progressing  Goal: Be free from injury by end of the shift  Outcome: Progressing  Goal: Verbalize understanding of personal risk factors for fall in the hospital  Outcome: Progressing  Goal: Verbalize understanding of risk factor reduction measures to prevent injury from fall in the home  Outcome: Progressing  Goal: Use assistive devices by end of the shift  Outcome: Progressing  Goal: Pace activities to prevent fatigue by end of the shift  Outcome: Progressing   Pt has remained safe throughout the shift.  PCA in place and effective for pain control.

## 2024-05-18 NOTE — PROGRESS NOTES
Marquita Ibanez is a 68 y.o. female on day 1 of admission presenting with Rectal cancer (Multi).    Subjective   Afebrile O/N.  No acute events O/N.  Pain controlled with PCA.  Denies nausea or emesis.  Sipped on some water to keep mouth dry.  Not out of bed yet.  No ambulation yet.    UOP 695cc  ABIEL 310cc       Objective     Physical Exam  Constitutional:       General: He is not in acute distress.     Appearance: Normal appearance. He is obese. He is not ill-appearing.   HENT:      Head: Normocephalic and atraumatic.      Nose: Nose normal.      Mouth/Throat:      Mouth: Mucous membranes are moist.   Eyes:      Extraocular Movements: Extraocular movements intact.   Pulmonary:      Effort: Pulmonary effort is normal.   Abdominal:      General: There is no distension.      Palpations: Abdomen is soft. There is no mass.      Tenderness: There is abdominal tenderness. There is no guarding or rebound.      Hernia: No hernia is present.      Comments: Incision covered with C/D dressing, LUQ ABIEL with SS output, RLQ ileostomy pink with bilious liquid output.   Genitourinary:     Comments: Sin catheter in placed draining yellow urine.  Musculoskeletal:      Cervical back: Normal range of motion and neck supple.   Skin:     General: Skin is warm.   Neurological:      General: No focal deficit present.      Mental Status: He is alert and oriented to person, place, and time.   Psychiatric:         Mood and Affect: Mood normal.         Last Recorded Vitals  Blood pressure 154/73, pulse 86, temperature 36.3 °C (97.3 °F), temperature source Temporal, resp. rate 16, SpO2 93%.  Intake/Output last 3 Shifts:  I/O last 3 completed shifts:  In: 4109.3 [I.V.:2509.3; IV Piggyback:1600]  Out: 1155 [Urine:695; Drains:310; Blood:150]    Relevant Results  Results for orders placed or performed during the hospital encounter of 05/17/24 (from the past 24 hour(s))   CBC   Result Value Ref Range    WBC 17.0 (H) 4.4 - 11.3 x10*3/uL    nRBC 0.0 0.0  - 0.0 /100 WBCs    RBC 4.87 4.00 - 5.20 x10*6/uL    Hemoglobin 13.9 12.0 - 16.0 g/dL    Hematocrit 44.2 36.0 - 46.0 %    MCV 91 80 - 100 fL    MCH 28.5 26.0 - 34.0 pg    MCHC 31.4 (L) 32.0 - 36.0 g/dL    RDW 14.5 11.5 - 14.5 %    Platelets 259 150 - 450 x10*3/uL         Assessment/Plan   Principal Problem:    Rectal cancer (Multi)    POD#1   Open low anterior resection  Mobilization splenic flexure  29 EEA CRA  Flexible sigmoidoscopy  Diverting loop ileostomy    #Rectal cancer  -  S/P above surgery  -  Advance to clear liquid diet  -  Continue low rate IVF today  -  Continue PCA today  -  DC villalobos  -  Continue operative drain  -  Encourage IS and ambulation    #HTN  -  Home metoprolol resumed  -  Home 81mg ASA resumed  -  Holding lisinopril    #GERD  -  Protonix ordered in place of home omeprazole    #HL  -  Home rosuvastatin resumed    -  Daily labs  -  Home healthcare arrangements       I spent 15 minutes in the professional and overall care of this patient.      Gustavo Harp MD

## 2024-05-18 NOTE — CARE PLAN
Problem: Pain - Adult  Goal: Verbalizes/displays adequate comfort level or baseline comfort level  Outcome: Progressing     Problem: Safety - Adult  Goal: Free from fall injury  Outcome: Progressing     Problem: Pain  Goal: Takes deep breaths with improved pain control throughout the shift  Outcome: Progressing  Goal: Turns in bed with improved pain control throughout the shift  Outcome: Progressing  Goal: Walks with improved pain control throughout the shift  Outcome: Progressing  Goal: Performs ADL's with improved pain control throughout shift  Outcome: Progressing  Goal: Participates in PT with improved pain control throughout the shift  Outcome: Progressing  Goal: Free from opioid side effects throughout the shift  Outcome: Progressing  Goal: Free from acute confusion related to pain meds throughout the shift  Outcome: Progressing     Problem: Fall/Injury  Goal: Not fall by end of shift  Outcome: Progressing  Goal: Be free from injury by end of the shift  Outcome: Progressing  Goal: Verbalize understanding of personal risk factors for fall in the hospital  Outcome: Progressing  Goal: Verbalize understanding of risk factor reduction measures to prevent injury from fall in the home  Outcome: Progressing  Goal: Use assistive devices by end of the shift  Outcome: Progressing  Goal: Pace activities to prevent fatigue by end of the shift  Outcome: Progressing   The patient's goals for the shift include      The clinical goals for the shift include Pain management    Patient remains safe and free of falls/injury this shift. Patient remained alert and orineted. Pain managed with routine Tylenol and Dilaudid PCA pump. Patient has only used twice this shift so far. Patient c/o gas pain more than incisional pain. Abdomen soft and tender. Ileostomy with sanguineous output along with scant amount of soft greenish/brown stool. ABIEL with moderate amount of sanguineous output. Sin to continuous drainage. IVF as ordered.  Dressing to abdomen CDI.

## 2024-05-19 LAB
ANION GAP SERPL CALC-SCNC: 7 MMOL/L (ref 10–20)
BUN SERPL-MCNC: 10 MG/DL (ref 6–23)
CALCIUM SERPL-MCNC: 9.1 MG/DL (ref 8.6–10.3)
CHLORIDE SERPL-SCNC: 104 MMOL/L (ref 98–107)
CO2 SERPL-SCNC: 26 MMOL/L (ref 21–32)
CREAT SERPL-MCNC: 0.62 MG/DL (ref 0.5–1.05)
EGFRCR SERPLBLD CKD-EPI 2021: >90 ML/MIN/1.73M*2
ERYTHROCYTE [DISTWIDTH] IN BLOOD BY AUTOMATED COUNT: 14.5 % (ref 11.5–14.5)
GLUCOSE SERPL-MCNC: 108 MG/DL (ref 74–99)
HCT VFR BLD AUTO: 34.5 % (ref 36–46)
HGB BLD-MCNC: 11.1 G/DL (ref 12–16)
MAGNESIUM SERPL-MCNC: 1.86 MG/DL (ref 1.6–2.4)
MCH RBC QN AUTO: 28.8 PG (ref 26–34)
MCHC RBC AUTO-ENTMCNC: 32.2 G/DL (ref 32–36)
MCV RBC AUTO: 90 FL (ref 80–100)
NRBC BLD-RTO: 0 /100 WBCS (ref 0–0)
PHOSPHATE SERPL-MCNC: 1.5 MG/DL (ref 2.5–4.9)
PLATELET # BLD AUTO: 201 X10*3/UL (ref 150–450)
POTASSIUM SERPL-SCNC: 4.4 MMOL/L (ref 3.5–5.3)
RBC # BLD AUTO: 3.85 X10*6/UL (ref 4–5.2)
SODIUM SERPL-SCNC: 133 MMOL/L (ref 136–145)
WBC # BLD AUTO: 14.7 X10*3/UL (ref 4.4–11.3)

## 2024-05-19 PROCEDURE — 36415 COLL VENOUS BLD VENIPUNCTURE: CPT | Performed by: STUDENT IN AN ORGANIZED HEALTH CARE EDUCATION/TRAINING PROGRAM

## 2024-05-19 PROCEDURE — 1100000001 HC PRIVATE ROOM DAILY

## 2024-05-19 PROCEDURE — 80048 BASIC METABOLIC PNL TOTAL CA: CPT | Performed by: STUDENT IN AN ORGANIZED HEALTH CARE EDUCATION/TRAINING PROGRAM

## 2024-05-19 PROCEDURE — 2500000004 HC RX 250 GENERAL PHARMACY W/ HCPCS (ALT 636 FOR OP/ED): Performed by: STUDENT IN AN ORGANIZED HEALTH CARE EDUCATION/TRAINING PROGRAM

## 2024-05-19 PROCEDURE — 85027 COMPLETE CBC AUTOMATED: CPT | Performed by: STUDENT IN AN ORGANIZED HEALTH CARE EDUCATION/TRAINING PROGRAM

## 2024-05-19 PROCEDURE — 83735 ASSAY OF MAGNESIUM: CPT | Performed by: STUDENT IN AN ORGANIZED HEALTH CARE EDUCATION/TRAINING PROGRAM

## 2024-05-19 PROCEDURE — 99231 SBSQ HOSP IP/OBS SF/LOW 25: CPT | Performed by: STUDENT IN AN ORGANIZED HEALTH CARE EDUCATION/TRAINING PROGRAM

## 2024-05-19 PROCEDURE — 2500000006 HC RX 250 W HCPCS SELF ADMINISTERED DRUGS (ALT 637 FOR ALL PAYERS): Performed by: STUDENT IN AN ORGANIZED HEALTH CARE EDUCATION/TRAINING PROGRAM

## 2024-05-19 PROCEDURE — 2500000001 HC RX 250 WO HCPCS SELF ADMINISTERED DRUGS (ALT 637 FOR MEDICARE OP): Performed by: STUDENT IN AN ORGANIZED HEALTH CARE EDUCATION/TRAINING PROGRAM

## 2024-05-19 PROCEDURE — 84100 ASSAY OF PHOSPHORUS: CPT | Performed by: STUDENT IN AN ORGANIZED HEALTH CARE EDUCATION/TRAINING PROGRAM

## 2024-05-19 RX ORDER — OXYCODONE HYDROCHLORIDE 5 MG/1
5 TABLET ORAL EVERY 6 HOURS PRN
Status: DISCONTINUED | OUTPATIENT
Start: 2024-05-19 | End: 2024-05-23 | Stop reason: HOSPADM

## 2024-05-19 RX ADMIN — OXYCODONE HYDROCHLORIDE 5 MG: 5 TABLET ORAL at 14:08

## 2024-05-19 RX ADMIN — ACETAMINOPHEN 650 MG: 325 TABLET ORAL at 18:13

## 2024-05-19 RX ADMIN — GABAPENTIN 300 MG: 300 CAPSULE ORAL at 15:48

## 2024-05-19 RX ADMIN — GABAPENTIN 300 MG: 300 CAPSULE ORAL at 21:34

## 2024-05-19 RX ADMIN — GABAPENTIN 300 MG: 300 CAPSULE ORAL at 08:37

## 2024-05-19 RX ADMIN — ACETAMINOPHEN 650 MG: 325 TABLET ORAL at 23:03

## 2024-05-19 RX ADMIN — ASPIRIN 81 MG 81 MG: 81 TABLET ORAL at 08:37

## 2024-05-19 RX ADMIN — PANTOPRAZOLE SODIUM 40 MG: 40 TABLET, DELAYED RELEASE ORAL at 06:18

## 2024-05-19 RX ADMIN — ROSUVASTATIN CALCIUM 5 MG: 5 TABLET, FILM COATED ORAL at 21:34

## 2024-05-19 RX ADMIN — OXYCODONE HYDROCHLORIDE 5 MG: 5 TABLET ORAL at 21:37

## 2024-05-19 RX ADMIN — ACETAMINOPHEN 650 MG: 325 TABLET ORAL at 11:48

## 2024-05-19 RX ADMIN — ACETAMINOPHEN 650 MG: 325 TABLET ORAL at 04:29

## 2024-05-19 RX ADMIN — ENOXAPARIN SODIUM 40 MG: 40 INJECTION SUBCUTANEOUS at 08:38

## 2024-05-19 RX ADMIN — METOPROLOL SUCCINATE 25 MG: 25 TABLET, EXTENDED RELEASE ORAL at 08:37

## 2024-05-19 ASSESSMENT — PAIN - FUNCTIONAL ASSESSMENT
PAIN_FUNCTIONAL_ASSESSMENT: 0-10

## 2024-05-19 ASSESSMENT — COGNITIVE AND FUNCTIONAL STATUS - GENERAL
PERSONAL GROOMING: A LITTLE
MOBILITY SCORE: 21
CLIMB 3 TO 5 STEPS WITH RAILING: TOTAL
DAILY ACTIVITIY SCORE: 22
EATING MEALS: A LITTLE

## 2024-05-19 ASSESSMENT — PAIN DESCRIPTION - ORIENTATION
ORIENTATION: MID

## 2024-05-19 ASSESSMENT — PAIN DESCRIPTION - LOCATION
LOCATION: ABDOMEN

## 2024-05-19 ASSESSMENT — PAIN SCALES - GENERAL
PAINLEVEL_OUTOF10: 6
PAINLEVEL_OUTOF10: 0 - NO PAIN
PAINLEVEL_OUTOF10: 4
PAINLEVEL_OUTOF10: 2
PAINLEVEL_OUTOF10: 5 - MODERATE PAIN
PAINLEVEL_OUTOF10: 6
PAINLEVEL_OUTOF10: 5 - MODERATE PAIN
PAINLEVEL_OUTOF10: 4

## 2024-05-19 NOTE — CARE PLAN
The patient's goals for the shift include      The clinical goals for the shift include remain clinically stable and safe        Problem: Pain - Adult  Goal: Verbalizes/displays adequate comfort level or baseline comfort level  5/19/2024 0654 by Camille Villeda RN  Outcome: Progressing  5/18/2024 2048 by Camille Villeda, RN  Outcome: Progressing

## 2024-05-19 NOTE — PROGRESS NOTES
Marqutia Ibanez is a 68 y.o. female on day 2 of admission presenting with Rectal cancer (Multi).    Subjective   Afebrile O/N.  No acute events O/N.  Pain controlled with PCA; using sparingly.  Denies nausea or emesis.  Tolerated clear liquids.  Ambulating in room and hallway.  Denies dysuria.    UOP 2325cc  ABIEL 350cc       Objective     Physical Exam  Constitutional:       General: He is not in acute distress.     Appearance: Normal appearance. He is obese. He is not ill-appearing.   HENT:      Head: Normocephalic and atraumatic.      Nose: Nose normal.      Mouth/Throat:      Mouth: Mucous membranes are moist.   Eyes:      Extraocular Movements: Extraocular movements intact.   Pulmonary:      Effort: Pulmonary effort is normal.   Abdominal:      General: There is no distension.      Palpations: Abdomen is soft. There is no mass.      Tenderness: There is abdominal tenderness. There is no guarding or rebound.      Hernia: No hernia is present.      Comments: Incision covered with C/D dressing, LUQ ABIEL with SS output, RLQ ileostomy pink with bilious liquid output.   Genitourinary:     Comments: Sin catheter in placed draining yellow urine.  Musculoskeletal:      Cervical back: Normal range of motion and neck supple.   Skin:     General: Skin is warm.   Neurological:      General: No focal deficit present.      Mental Status: He is alert and oriented to person, place, and time.   Psychiatric:         Mood and Affect: Mood normal.         Last Recorded Vitals  Blood pressure 151/80, pulse 81, temperature 36 °C (96.8 °F), temperature source Temporal, resp. rate 16, weight 83.9 kg (184 lb 15.5 oz), SpO2 94%.  Intake/Output last 3 Shifts:  I/O last 3 completed shifts:  In: 1957.2 (23.3 mL/kg) [P.O.:360; I.V.:1597.2 (19 mL/kg)]  Out: 3160 (37.7 mL/kg) [Urine:2650 (0.9 mL/kg/hr); Drains:480; Stool:30]  Weight: 83.9 kg     Relevant Results  Results for orders placed or performed during the hospital encounter of 05/17/24 (from  the past 24 hour(s))   Magnesium   Result Value Ref Range    Magnesium 1.86 1.60 - 2.40 mg/dL   CBC   Result Value Ref Range    WBC 14.7 (H) 4.4 - 11.3 x10*3/uL    nRBC 0.0 0.0 - 0.0 /100 WBCs    RBC 3.85 (L) 4.00 - 5.20 x10*6/uL    Hemoglobin 11.1 (L) 12.0 - 16.0 g/dL    Hematocrit 34.5 (L) 36.0 - 46.0 %    MCV 90 80 - 100 fL    MCH 28.8 26.0 - 34.0 pg    MCHC 32.2 32.0 - 36.0 g/dL    RDW 14.5 11.5 - 14.5 %    Platelets 201 150 - 450 x10*3/uL         Assessment/Plan   Principal Problem:    Rectal cancer (Multi)    POD#2   Open low anterior resection  Mobilization splenic flexure  29 EEA CRA  Flexible sigmoidoscopy  Diverting loop ileostomy    #Rectal cancer  -  S/P above surgery  -  Remain on clear liquid diet  -  DC IVF 5/19  -  DC PCA, transition to PO regimen  -  DC villalobos 5/18  -  Continue operative drain  -  Encourage IS and ambulation    #HTN  -  BP acceptable  -  Home metoprolol resumed  -  Home 81mg ASA resumed  -  Holding lisinopril    #GERD  -  Protonix ordered in place of home omeprazole    #HL  -  Home rosuvastatin resumed    -  Daily labs  -  Home healthcare arrangements       I spent 15 minutes in the professional and overall care of this patient.      Gustavo Harp MD

## 2024-05-19 NOTE — CARE PLAN
The patient's goals for the shift include      The clinical goals for the shift include Pt will have pain remain at manageble level.      Problem: Pain - Adult  Goal: Verbalizes/displays adequate comfort level or baseline comfort level  Outcome: Progressing     Problem: Safety - Adult  Goal: Free from fall injury  Outcome: Progressing     Problem: Pain  Goal: Takes deep breaths with improved pain control throughout the shift  Outcome: Progressing  Goal: Turns in bed with improved pain control throughout the shift  Outcome: Progressing  Goal: Walks with improved pain control throughout the shift  Outcome: Progressing  Goal: Performs ADL's with improved pain control throughout shift  Outcome: Progressing  Goal: Participates in PT with improved pain control throughout the shift  Outcome: Progressing  Goal: Free from opioid side effects throughout the shift  Outcome: Progressing  Goal: Free from acute confusion related to pain meds throughout the shift  Outcome: Progressing     Problem: Fall/Injury  Goal: Not fall by end of shift  Outcome: Progressing  Goal: Be free from injury by end of the shift  Outcome: Progressing  Goal: Verbalize understanding of personal risk factors for fall in the hospital  Outcome: Progressing  Goal: Verbalize understanding of risk factor reduction measures to prevent injury from fall in the home  Outcome: Progressing  Goal: Use assistive devices by end of the shift  Outcome: Progressing  Goal: Pace activities to prevent fatigue by end of the shift  Outcome: Progressing   Pt remained safe throughout the shift.  Required PRN pain medication x 1 this shift with good results.

## 2024-05-20 LAB
ANION GAP SERPL CALC-SCNC: 9 MMOL/L (ref 10–20)
BUN SERPL-MCNC: 9 MG/DL (ref 6–23)
CALCIUM SERPL-MCNC: 9.1 MG/DL (ref 8.6–10.3)
CHLORIDE SERPL-SCNC: 103 MMOL/L (ref 98–107)
CO2 SERPL-SCNC: 27 MMOL/L (ref 21–32)
CREAT SERPL-MCNC: 0.65 MG/DL (ref 0.5–1.05)
EGFRCR SERPLBLD CKD-EPI 2021: >90 ML/MIN/1.73M*2
ERYTHROCYTE [DISTWIDTH] IN BLOOD BY AUTOMATED COUNT: 14.2 % (ref 11.5–14.5)
GLUCOSE SERPL-MCNC: 91 MG/DL (ref 74–99)
HCT VFR BLD AUTO: 34.2 % (ref 36–46)
HGB BLD-MCNC: 10.9 G/DL (ref 12–16)
MAGNESIUM SERPL-MCNC: 1.8 MG/DL (ref 1.6–2.4)
MCH RBC QN AUTO: 29 PG (ref 26–34)
MCHC RBC AUTO-ENTMCNC: 31.9 G/DL (ref 32–36)
MCV RBC AUTO: 91 FL (ref 80–100)
NRBC BLD-RTO: 0 /100 WBCS (ref 0–0)
PHOSPHATE SERPL-MCNC: 2.1 MG/DL (ref 2.5–4.9)
PLATELET # BLD AUTO: 215 X10*3/UL (ref 150–450)
POTASSIUM SERPL-SCNC: 4.1 MMOL/L (ref 3.5–5.3)
RBC # BLD AUTO: 3.76 X10*6/UL (ref 4–5.2)
SODIUM SERPL-SCNC: 135 MMOL/L (ref 136–145)
WBC # BLD AUTO: 11.3 X10*3/UL (ref 4.4–11.3)

## 2024-05-20 PROCEDURE — 2500000006 HC RX 250 W HCPCS SELF ADMINISTERED DRUGS (ALT 637 FOR ALL PAYERS): Mod: MUE | Performed by: STUDENT IN AN ORGANIZED HEALTH CARE EDUCATION/TRAINING PROGRAM

## 2024-05-20 PROCEDURE — 80048 BASIC METABOLIC PNL TOTAL CA: CPT | Performed by: STUDENT IN AN ORGANIZED HEALTH CARE EDUCATION/TRAINING PROGRAM

## 2024-05-20 PROCEDURE — 2500000004 HC RX 250 GENERAL PHARMACY W/ HCPCS (ALT 636 FOR OP/ED): Performed by: STUDENT IN AN ORGANIZED HEALTH CARE EDUCATION/TRAINING PROGRAM

## 2024-05-20 PROCEDURE — 2500000001 HC RX 250 WO HCPCS SELF ADMINISTERED DRUGS (ALT 637 FOR MEDICARE OP): Performed by: STUDENT IN AN ORGANIZED HEALTH CARE EDUCATION/TRAINING PROGRAM

## 2024-05-20 PROCEDURE — 2500000001 HC RX 250 WO HCPCS SELF ADMINISTERED DRUGS (ALT 637 FOR MEDICARE OP): Performed by: NURSE PRACTITIONER

## 2024-05-20 PROCEDURE — 85027 COMPLETE CBC AUTOMATED: CPT | Performed by: STUDENT IN AN ORGANIZED HEALTH CARE EDUCATION/TRAINING PROGRAM

## 2024-05-20 PROCEDURE — 83735 ASSAY OF MAGNESIUM: CPT | Performed by: STUDENT IN AN ORGANIZED HEALTH CARE EDUCATION/TRAINING PROGRAM

## 2024-05-20 PROCEDURE — 36415 COLL VENOUS BLD VENIPUNCTURE: CPT | Performed by: STUDENT IN AN ORGANIZED HEALTH CARE EDUCATION/TRAINING PROGRAM

## 2024-05-20 PROCEDURE — 84100 ASSAY OF PHOSPHORUS: CPT | Performed by: STUDENT IN AN ORGANIZED HEALTH CARE EDUCATION/TRAINING PROGRAM

## 2024-05-20 PROCEDURE — 1100000001 HC PRIVATE ROOM DAILY

## 2024-05-20 RX ORDER — ACETAMINOPHEN 325 MG/1
650 TABLET ORAL EVERY 6 HOURS PRN
Start: 2024-05-20

## 2024-05-20 RX ADMIN — Medication 250 MG: at 12:01

## 2024-05-20 RX ADMIN — ENOXAPARIN SODIUM 40 MG: 40 INJECTION SUBCUTANEOUS at 08:50

## 2024-05-20 RX ADMIN — Medication 250 MG: at 17:47

## 2024-05-20 RX ADMIN — GABAPENTIN 300 MG: 300 CAPSULE ORAL at 08:50

## 2024-05-20 RX ADMIN — ACETAMINOPHEN 650 MG: 325 TABLET ORAL at 11:49

## 2024-05-20 RX ADMIN — ACETAMINOPHEN 650 MG: 325 TABLET ORAL at 05:03

## 2024-05-20 RX ADMIN — ROSUVASTATIN CALCIUM 5 MG: 5 TABLET, FILM COATED ORAL at 21:42

## 2024-05-20 RX ADMIN — PANTOPRAZOLE SODIUM 40 MG: 40 TABLET, DELAYED RELEASE ORAL at 06:14

## 2024-05-20 RX ADMIN — ACETAMINOPHEN 650 MG: 325 TABLET ORAL at 17:47

## 2024-05-20 RX ADMIN — GABAPENTIN 300 MG: 300 CAPSULE ORAL at 21:42

## 2024-05-20 RX ADMIN — ASPIRIN 81 MG 81 MG: 81 TABLET ORAL at 08:50

## 2024-05-20 RX ADMIN — GABAPENTIN 300 MG: 300 CAPSULE ORAL at 15:48

## 2024-05-20 RX ADMIN — METOPROLOL SUCCINATE 25 MG: 25 TABLET, EXTENDED RELEASE ORAL at 08:50

## 2024-05-20 ASSESSMENT — COGNITIVE AND FUNCTIONAL STATUS - GENERAL
MOBILITY SCORE: 24
DAILY ACTIVITIY SCORE: 24

## 2024-05-20 ASSESSMENT — PAIN DESCRIPTION - LOCATION: LOCATION: ABDOMEN

## 2024-05-20 ASSESSMENT — PAIN SCALES - GENERAL
PAINLEVEL_OUTOF10: 4
PAINLEVEL_OUTOF10: 2
PAINLEVEL_OUTOF10: 3

## 2024-05-20 ASSESSMENT — PAIN - FUNCTIONAL ASSESSMENT
PAIN_FUNCTIONAL_ASSESSMENT: 0-10

## 2024-05-20 ASSESSMENT — PAIN DESCRIPTION - ORIENTATION: ORIENTATION: MID

## 2024-05-20 NOTE — PROGRESS NOTES
05/20/24 0951   Discharge Planning   Living Arrangements Children   Support Systems Children   Type of Residence Private residence   Home or Post Acute Services In home services   Type of Home Care Services Home nursing visits;Home PT   Patient expects to be discharged to: Home with [facility]   Does the patient need discharge transport arranged? No     Met with patient and son at bedside. Admitted for rectal CA, ileostomy. Pt lives with son and was independent PTA with no HHC or DME. Pt is looking for a PCP. Pt is able to manage her health and understands her medications. Is able to drive to her appointments and obtain medications. Pt plans to return home with [facility] for new ostomy care. Internal referral requested. Family will provide transport home.

## 2024-05-20 NOTE — PROGRESS NOTES
Janis Ibanez 68 y.o. female    Subjective  Patient seen and examined this morning.  Denies nausea and vomiting.  No fever or chills. Tolerating clear liquids.  Ileostomy with bilious output, minimal gas.   Urinating well. Up ambulating. No acute events overnight.    ABIEL 120cc  UOP 1550cc    Objective  PHYSICAL EXAM:  Physical Exam  Vitals reviewed.   Constitutional:       General: He is awake.      Appearance: Normal appearance.   Cardiovascular:      Rate and Rhythm: Normal rate and regular rhythm.      Pulses: Normal pulses.      Heart sounds: Normal heart sounds.   Pulmonary:      Effort: Pulmonary effort is normal.      Breath sounds: Normal breath sounds and air entry.   Abdominal:      General: Abdomen is flat. There is no distension.      Palpations: Abdomen is soft.      Tenderness: There is no guarding or rebound.      Comments:  Incision covered with C/D dressing, LUQ ABIEL with SS output, RLQ ileostomy pink with bilious liquid output. Appropriately TTP.    Genitourinary:     Comments: Sin catheter absent   Musculoskeletal:         General: Normal range of motion.      Cervical back: Normal range of motion.   Skin:     General: Skin is warm and dry.   Neurological:      General: No focal deficit present.      Mental Status: He is alert and oriented to person, place, and time.   Psychiatric:         Behavior: Behavior is cooperative.         Vital signs in last 24 hours:  Vitals:    05/20/24 0800   BP: 133/79   Pulse: 76   Resp: 16   Temp: 36.2 °C (97.2 °F)   SpO2: 98%         Intake/Output this shift:    Intake/Output Summary (Last 24 hours) at 5/20/2024 0937  Last data filed at 5/20/2024 0400  Gross per 24 hour   Intake 480 ml   Output 1670 ml   Net -1190 ml        Allergies:  No Known Allergies     Medications:  Scheduled medications  acetaminophen, 650 mg, oral, q6h  aspirin, 81 mg, oral, Daily  enoxaparin, 40 mg, subcutaneous, q24h  gabapentin, 300 mg, oral, TID  metoprolol succinate XL, 25 mg,  oral, Daily  pantoprazole, 40 mg, oral, Daily before breakfast  rosuvastatin, 5 mg, oral, Nightly      Continuous medications     PRN medications  PRN medications: naloxone, ondansetron ODT **OR** ondansetron, oxyCODONE, oxygen       Labs:  Results for orders placed or performed during the hospital encounter of 05/17/24 (from the past 24 hour(s))   Magnesium   Result Value Ref Range    Magnesium 1.80 1.60 - 2.40 mg/dL   CBC   Result Value Ref Range    WBC 11.3 4.4 - 11.3 x10*3/uL    nRBC 0.0 0.0 - 0.0 /100 WBCs    RBC 3.76 (L) 4.00 - 5.20 x10*6/uL    Hemoglobin 10.9 (L) 12.0 - 16.0 g/dL    Hematocrit 34.2 (L) 36.0 - 46.0 %    MCV 91 80 - 100 fL    MCH 29.0 26.0 - 34.0 pg    MCHC 31.9 (L) 32.0 - 36.0 g/dL    RDW 14.2 11.5 - 14.5 %    Platelets 215 150 - 450 x10*3/uL   Basic Metabolic Panel   Result Value Ref Range    Glucose 91 74 - 99 mg/dL    Sodium 135 (L) 136 - 145 mmol/L    Potassium 4.1 3.5 - 5.3 mmol/L    Chloride 103 98 - 107 mmol/L    Bicarbonate 27 21 - 32 mmol/L    Anion Gap 9 (L) 10 - 20 mmol/L    Urea Nitrogen 9 6 - 23 mg/dL    Creatinine 0.65 0.50 - 1.05 mg/dL    eGFR >90 >60 mL/min/1.73m*2    Calcium 9.1 8.6 - 10.3 mg/dL   Phosphorus   Result Value Ref Range    Phosphorus 2.1 (L) 2.5 - 4.9 mg/dL        Imaging:  No results found.     Plan   Rectal cancer (Multi)     POD#3   Open low anterior resection  Mobilization splenic flexure  29 EEA CRA  Flexible sigmoidoscopy  Diverting loop ileostomy     #Rectal cancer  -  S/P above surgery  -  Advance to FLD  -  DC IVF 5/19  -  Multimodal pain regimen   -  DC villalobos 5/18  -  Continue operative drain  -  Encourage IS and ambulation  -  Stoma nurse on for education - ok to remove nia today     #Hypophosphatemia  -  Replete     #HTN  -  BP acceptable  -  Home metoprolol resumed  -  Home 81mg ASA resumed  -  Holding lisinopril     #GERD  -  Protonix ordered in place of home omeprazole     #HLD  -  Home rosuvastatin resumed     -  Daily labs  -  Home healthcare  arrangements      Plan of care discussed and patient seen with Dr. Loyd Spencer, SHARMILA-CNP    I spent 25 minutes in the professional and overall care of this patient.

## 2024-05-20 NOTE — PROGRESS NOTES
"Nutrition Diet Education  Reason for education: CORS    Nutrition Note:  Janis Ibanez \"Marquita\" is a 68 y.o. female presenting for planned bowel resection and ileostomy placement after she was found to have colon cancer towards the end of 2023.  She did have neoadjuvant chemo a few months ago. Weight has been stable through treatment.    Past Medical History: MI, coronary stents about 10 years ago  PSH: open cholecystectomy, open appendectomy, open hysterectomy, vascular surgery on right groin for blockage     Nutrition Significant Labs:  BMP Trend:   Results from last 7 days   Lab Units 05/20/24  0603 05/19/24  0729 05/18/24  0607   GLUCOSE mg/dL 91 108* 132*   CALCIUM mg/dL 9.1 9.1 8.8   SODIUM mmol/L 135* 133* 135*   POTASSIUM mmol/L 4.1 4.4 4.6   CO2 mmol/L 27 26 26   CHLORIDE mmol/L 103 104 102   BUN mg/dL 9 10 17   CREATININE mg/dL 0.65 0.62 0.87     Current Diet: Adult diet Full Liquid    Encounter summary: Met with pt at bedside.  Advanced from CLD to FLD today.  She notes that she unintentionally lost roughly 20# while on chemotherapy that started in November.  Pt states her appetite has been much better and she can \"actually taste food again.\"  She typically tries to including higher fiber foods but understanding of rationale for lower fiber foods at this time.  She would like to consume Ensure while on FLD.  Pt states that she is supposed to have ileostomy reversal potentially in 3 months.    Education Documentation:   Education Documentation  Nutrition Care Manual, taught by Cole Thurston RD at 5/20/2024  3:38 PM.  Learner: Significant Other, Patient  Readiness: Acceptance  Method: Explanation, Handout  Response: Verbalizes Understanding  Comment: Provided and discussed Ileostomy Nutrition Therapy from [facility].  Discussed rationale for low fiber diet, drinking adequate fluids, and eating smalle frequent meals/snacks with largest meal in the middle of the day.              Nutrition " Prescription/Recommended Diet:  Individualized Nutrition Prescription Provided for : Recommend continue full liquid diet and advance to low fiber diet when given okay per MD    Time Spent/Follow-up Reminder:   Time Spent (min): 60 minutes  Last Date of Nutrition Visit: 05/20/24  Nutrition Follow-Up Needed?: 3-8 days  Follow up Comment: MARK - ed completed

## 2024-05-20 NOTE — DISCHARGE INSTRUCTIONS
WOUND CARE:  OK to shower.  Remove any dressings prior to showering.  Do not scrub your incision(s).  Pat down abdomen dry following shower and cover incisions with clean, dry gauze dressings if there is drainage.  No tub bath/soaking/swimming until cleared at outpatient appointment.  Do not remove any staples or stitches; if these are present they will be removed at outpatient appointment.  Any glue will peel away on its own.     If you have been discharged to home with an operative drain (ie bulb), empty drain daily as needed.  Please record daily output volumes and characteristics in a log.  Please bring this log with you to your follow-up appointment.     PAIN CONTROL:  Can utilize acetaminophen and/or ibuprofen.     ACTIVITY:  No heavy lifting (>10-15lbs) or strenuous activity for 6 weeks.  OK to walk and take stairs at slow pace.  Do not drive or operative heavy machinery for at least first 24 hours after surgery; if you do not feel able to safely operate after first 24 hours or are taking narcotics (ie percocet or vicodin) then do not operative heavy machinery or drive.     DIET:  Adhere to soft low-fiber diet until seen in follow-up.     FOLLOW-UP:  Please call office at [phone] to set up a follow-up appointment for approximately 2 weeks from the date of surgery.

## 2024-05-20 NOTE — CONSULTS
Wound Care Consult     Visit Date: 5/20/2024      Patient Name: Marquita Ibanez         MRN: [id number]           YOB: 1955     Reason for Consult: Ostomy Care and Education        Wound History: Closed Surgical Incision     Pertinent Labs:   Albumin   Date Value Ref Range Status   03/28/2024 3.8 3.4 - 5.0 g/dL Final       Wound Assessment:  Wound 05/17/24 Incision Abdomen Medial;Upper (Active)   Site Assessment Intact;Dry 05/20/24 1400   Ingrid-Wound Assessment Intact 05/20/24 1400   Margins Attached edges 05/20/24 1400   Closure Staples 05/20/24 1400   Sutures/Staple Line Approximated 05/20/24 1400   Drainage Description None 05/20/24 0800   Drainage Amount None 05/20/24 1400   Dressing Other (Comment) 05/20/24 0800   Dressing Changed Changed 05/20/24 1400   Dressing Status Clean;Dry 05/20/24 1400       Wound Team Summary Assessment: RLQ ileostomy pouch leaking, midline dressing soiled, removed.  LLQ ABIEL drain with serosanguinous drainage. Stoma and peristomal tissue cleanse with water, incision cleanse with CHG. Island dressing reapplied. Stoma red.moist, measures 1 inch, functions downward at 6 o'clock, distal half of stoma retracted, proximal protruding. Peristomal tissue intact, red. Stomahesive powder and skin prep to peristomal tissue, [place] 2 piece pouch, convex barrier with barrier ring applied as well as ostomy belt. Patient instructed in how to and how often to empty and change bag.      Wound Team Plan: Will follow-up tomorrow for ostomy care and education.      Kacy Johnson RN  5/20/2024  2:19 PM

## 2024-05-20 NOTE — PROGRESS NOTES
Spiritual Care Visit    Clinical Encounter Type  Visited With: Patient and family together  Routine Visit: Introduction  Continue Visiting: No                                            Taxonomy  Intended Effects: Preserve dignity and respect, Gavi affirmation, Promote sense of peace, Helping someone feel comforted  Methods: Offer spiritual/Voodoo support  Interventions: Share words of hope and inspiration, [place]    Patient was with her son and talked about her family.  Patient showed the  pictures of her grandson.   listened to her story and prayed at her request.

## 2024-05-21 LAB
ANION GAP SERPL CALC-SCNC: 12 MMOL/L (ref 10–20)
BUN SERPL-MCNC: 7 MG/DL (ref 6–23)
CALCIUM SERPL-MCNC: 9.2 MG/DL (ref 8.6–10.3)
CHLORIDE SERPL-SCNC: 102 MMOL/L (ref 98–107)
CO2 SERPL-SCNC: 26 MMOL/L (ref 21–32)
CREAT SERPL-MCNC: 0.58 MG/DL (ref 0.5–1.05)
EGFRCR SERPLBLD CKD-EPI 2021: >90 ML/MIN/1.73M*2
ERYTHROCYTE [DISTWIDTH] IN BLOOD BY AUTOMATED COUNT: 14 % (ref 11.5–14.5)
GLUCOSE SERPL-MCNC: 109 MG/DL (ref 74–99)
HCT VFR BLD AUTO: 35.1 % (ref 36–46)
HGB BLD-MCNC: 11.4 G/DL (ref 12–16)
MAGNESIUM SERPL-MCNC: 1.76 MG/DL (ref 1.6–2.4)
MCH RBC QN AUTO: 29.4 PG (ref 26–34)
MCHC RBC AUTO-ENTMCNC: 32.5 G/DL (ref 32–36)
MCV RBC AUTO: 91 FL (ref 80–100)
NRBC BLD-RTO: 0 /100 WBCS (ref 0–0)
PHOSPHATE SERPL-MCNC: 3.5 MG/DL (ref 2.5–4.9)
PLATELET # BLD AUTO: 247 X10*3/UL (ref 150–450)
POTASSIUM SERPL-SCNC: 3.8 MMOL/L (ref 3.5–5.3)
RBC # BLD AUTO: 3.88 X10*6/UL (ref 4–5.2)
SODIUM SERPL-SCNC: 136 MMOL/L (ref 136–145)
WBC # BLD AUTO: 11.1 X10*3/UL (ref 4.4–11.3)

## 2024-05-21 PROCEDURE — 80048 BASIC METABOLIC PNL TOTAL CA: CPT | Performed by: STUDENT IN AN ORGANIZED HEALTH CARE EDUCATION/TRAINING PROGRAM

## 2024-05-21 PROCEDURE — 36415 COLL VENOUS BLD VENIPUNCTURE: CPT | Performed by: STUDENT IN AN ORGANIZED HEALTH CARE EDUCATION/TRAINING PROGRAM

## 2024-05-21 PROCEDURE — 83735 ASSAY OF MAGNESIUM: CPT | Performed by: STUDENT IN AN ORGANIZED HEALTH CARE EDUCATION/TRAINING PROGRAM

## 2024-05-21 PROCEDURE — 85027 COMPLETE CBC AUTOMATED: CPT | Performed by: STUDENT IN AN ORGANIZED HEALTH CARE EDUCATION/TRAINING PROGRAM

## 2024-05-21 PROCEDURE — 2500000006 HC RX 250 W HCPCS SELF ADMINISTERED DRUGS (ALT 637 FOR ALL PAYERS): Performed by: STUDENT IN AN ORGANIZED HEALTH CARE EDUCATION/TRAINING PROGRAM

## 2024-05-21 PROCEDURE — 2500000004 HC RX 250 GENERAL PHARMACY W/ HCPCS (ALT 636 FOR OP/ED): Performed by: STUDENT IN AN ORGANIZED HEALTH CARE EDUCATION/TRAINING PROGRAM

## 2024-05-21 PROCEDURE — 84100 ASSAY OF PHOSPHORUS: CPT | Performed by: STUDENT IN AN ORGANIZED HEALTH CARE EDUCATION/TRAINING PROGRAM

## 2024-05-21 PROCEDURE — 1100000001 HC PRIVATE ROOM DAILY

## 2024-05-21 PROCEDURE — 2500000001 HC RX 250 WO HCPCS SELF ADMINISTERED DRUGS (ALT 637 FOR MEDICARE OP): Performed by: STUDENT IN AN ORGANIZED HEALTH CARE EDUCATION/TRAINING PROGRAM

## 2024-05-21 RX ADMIN — METOPROLOL SUCCINATE 25 MG: 25 TABLET, EXTENDED RELEASE ORAL at 08:48

## 2024-05-21 RX ADMIN — ACETAMINOPHEN 650 MG: 325 TABLET ORAL at 06:19

## 2024-05-21 RX ADMIN — ACETAMINOPHEN 650 MG: 325 TABLET ORAL at 23:10

## 2024-05-21 RX ADMIN — PANTOPRAZOLE SODIUM 40 MG: 40 TABLET, DELAYED RELEASE ORAL at 06:19

## 2024-05-21 RX ADMIN — ACETAMINOPHEN 650 MG: 325 TABLET ORAL at 17:03

## 2024-05-21 RX ADMIN — ASPIRIN 81 MG 81 MG: 81 TABLET ORAL at 08:48

## 2024-05-21 RX ADMIN — ACETAMINOPHEN 650 MG: 325 TABLET ORAL at 11:07

## 2024-05-21 RX ADMIN — ENOXAPARIN SODIUM 40 MG: 40 INJECTION SUBCUTANEOUS at 08:48

## 2024-05-21 RX ADMIN — GABAPENTIN 300 MG: 300 CAPSULE ORAL at 20:57

## 2024-05-21 RX ADMIN — ROSUVASTATIN CALCIUM 5 MG: 5 TABLET, FILM COATED ORAL at 20:57

## 2024-05-21 RX ADMIN — GABAPENTIN 300 MG: 300 CAPSULE ORAL at 08:48

## 2024-05-21 RX ADMIN — GABAPENTIN 300 MG: 300 CAPSULE ORAL at 15:18

## 2024-05-21 RX ADMIN — ACETAMINOPHEN 650 MG: 325 TABLET ORAL at 00:29

## 2024-05-21 ASSESSMENT — COGNITIVE AND FUNCTIONAL STATUS - GENERAL
MOBILITY SCORE: 24
DAILY ACTIVITIY SCORE: 24

## 2024-05-21 ASSESSMENT — PAIN SCALES - GENERAL
PAINLEVEL_OUTOF10: 2
PAINLEVEL_OUTOF10: 4

## 2024-05-21 ASSESSMENT — PAIN - FUNCTIONAL ASSESSMENT
PAIN_FUNCTIONAL_ASSESSMENT: 0-10
PAIN_FUNCTIONAL_ASSESSMENT: 0-10

## 2024-05-21 NOTE — CARE PLAN
Problem: Pain - Adult  Goal: Verbalizes/displays adequate comfort level or baseline comfort level  Outcome: Progressing     Problem: Safety - Adult  Goal: Free from fall injury  Outcome: Progressing     Problem: Pain  Goal: Takes deep breaths with improved pain control throughout the shift  Outcome: Progressing  Goal: Turns in bed with improved pain control throughout the shift  Outcome: Progressing  Goal: Walks with improved pain control throughout the shift  Outcome: Progressing  Goal: Performs ADL's with improved pain control throughout shift  Outcome: Progressing  Goal: Participates in PT with improved pain control throughout the shift  Outcome: Progressing  Goal: Free from opioid side effects throughout the shift  Outcome: Progressing  Goal: Free from acute confusion related to pain meds throughout the shift  Outcome: Progressing     Problem: Fall/Injury  Goal: Not fall by end of shift  Outcome: Progressing  Goal: Be free from injury by end of the shift  Outcome: Progressing  Goal: Verbalize understanding of personal risk factors for fall in the hospital  Outcome: Progressing  Goal: Verbalize understanding of risk factor reduction measures to prevent injury from fall in the home  Outcome: Progressing  Goal: Use assistive devices by end of the shift  Outcome: Progressing  Goal: Pace activities to prevent fatigue by end of the shift  Outcome: Progressing   The patient's goals for the shift include      The clinical goals for the shift include Pt will have minimal pain this shift, ambulate with minimal assistance and receive adequate rest.    Over the shift, the patient did have minimal pain, was able to ambulate with minimal assistance in her room and received adequate rest.

## 2024-05-21 NOTE — PROGRESS NOTES
Janis Ibanez 68 y.o. female    Subjective  Patient seen and examined this morning.  Denies nausea and vomiting.  No fever or chills. Tolerating clear liquids.  Ileostomy with bilious output and gas.  Urinating well. Up ambulating. No acute events overnight.    BAIEL 50cc  Ileostomy 1300cc  UOP 1200cc    Objective  PHYSICAL EXAM:  Physical Exam  Vitals reviewed.   Constitutional:       General: He is awake.      Appearance: Normal appearance.   Cardiovascular:      Rate and Rhythm: Normal rate and regular rhythm.      Pulses: Normal pulses.      Heart sounds: Normal heart sounds.   Pulmonary:      Effort: Pulmonary effort is normal.      Breath sounds: Normal breath sounds and air entry.   Abdominal:      General: Abdomen is flat. There is no distension.      Palpations: Abdomen is soft.      Tenderness: There is no guarding or rebound.      Comments:  Incision covered with C/D dressing, LUQ ABIEL with SS output, RLQ ileostomy pink with gas and bilious liquid output. Appropriately TTP.    Genitourinary:     Comments: Sin catheter absent   Musculoskeletal:         General: Normal range of motion.      Cervical back: Normal range of motion.   Skin:     General: Skin is warm and dry.   Neurological:      General: No focal deficit present.      Mental Status: He is alert and oriented to person, place, and time.   Psychiatric:         Behavior: Behavior is cooperative.         Vital signs in last 24 hours:  Vitals:    05/21/24 0800   BP: 128/67   Pulse: 74   Resp: 18   Temp: 36.3 °C (97.3 °F)   SpO2: 97%         Intake/Output this shift:    Intake/Output Summary (Last 24 hours) at 5/21/2024 0950  Last data filed at 5/21/2024 0625  Gross per 24 hour   Intake 240 ml   Output 2550 ml   Net -2310 ml        Allergies:  No Known Allergies     Medications:  Scheduled medications  acetaminophen, 650 mg, oral, q6h  aspirin, 81 mg, oral, Daily  enoxaparin, 40 mg, subcutaneous, q24h  gabapentin, 300 mg, oral, TID  metoprolol  succinate XL, 25 mg, oral, Daily  pantoprazole, 40 mg, oral, Daily before breakfast  rosuvastatin, 5 mg, oral, Nightly      Continuous medications     PRN medications  PRN medications: naloxone, ondansetron ODT **OR** ondansetron, oxyCODONE, oxygen       Labs:  Results for orders placed or performed during the hospital encounter of 05/17/24 (from the past 24 hour(s))   Magnesium   Result Value Ref Range    Magnesium 1.76 1.60 - 2.40 mg/dL   CBC   Result Value Ref Range    WBC 11.1 4.4 - 11.3 x10*3/uL    nRBC 0.0 0.0 - 0.0 /100 WBCs    RBC 3.88 (L) 4.00 - 5.20 x10*6/uL    Hemoglobin 11.4 (L) 12.0 - 16.0 g/dL    Hematocrit 35.1 (L) 36.0 - 46.0 %    MCV 91 80 - 100 fL    MCH 29.4 26.0 - 34.0 pg    MCHC 32.5 32.0 - 36.0 g/dL    RDW 14.0 11.5 - 14.5 %    Platelets 247 150 - 450 x10*3/uL   Basic Metabolic Panel   Result Value Ref Range    Glucose 109 (H) 74 - 99 mg/dL    Sodium 136 136 - 145 mmol/L    Potassium 3.8 3.5 - 5.3 mmol/L    Chloride 102 98 - 107 mmol/L    Bicarbonate 26 21 - 32 mmol/L    Anion Gap 12 10 - 20 mmol/L    Urea Nitrogen 7 6 - 23 mg/dL    Creatinine 0.58 0.50 - 1.05 mg/dL    eGFR >90 >60 mL/min/1.73m*2    Calcium 9.2 8.6 - 10.3 mg/dL   Phosphorus   Result Value Ref Range    Phosphorus 3.5 2.5 - 4.9 mg/dL        Imaging:  No results found.     Plan   Rectal cancer (Multi)     POD#4   Open low anterior resection  Mobilization splenic flexure  29 EEA CRA  Flexible sigmoidoscopy  Diverting loop ileostomy     #Rectal cancer  -  S/P above surgery  -  Advance to low fiber/soft  -  DC IVF 5/19  -  Multimodal pain regimen   -  DC villalobos 5/18  -  Continue operative drain  -  Encourage IS and ambulation  -  Stoma nurse on for education - ok to remove nia today 5/20    #Hypophosphatemia  -  WNL    #HTN  -  BP acceptable  -  Home metoprolol resumed  -  Home 81mg ASA resumed  -  Holding lisinopril     #GERD  -  Protonix ordered in place of home omeprazole     #HLD  -  Home rosuvastatin resumed     -  Daily labs  -   Home healthcare arrangements      Plan of care discussed and patient seen with Dr. Loyd Spencer, SHARMILA-CNP    I spent 25 minutes in the professional and overall care of this patient.

## 2024-05-21 NOTE — CONSULTS
Wound Care Consult     Visit Date: 5/21/2024      Patient Name: Marquita Ibanez         MRN: [id number]           YOB: 1955     Reason for Consult: Ostomy Care and Education         Wound History: Closed Surgical Incision     Pertinent Labs:   Albumin   Date Value Ref Range Status   03/28/2024 3.8 3.4 - 5.0 g/dL Final       Wound Assessment:  Wound 05/17/24 Incision Abdomen Medial;Upper (Active)   Site Assessment Unable to assess 05/21/24 1147   Ingrid-Wound Assessment Intact 05/21/24 0800   Margins Unable to assess 05/20/24 2130   Closure Unable to assess 05/20/24 2130   Sutures/Staple Line Approximated 05/20/24 1400   Drainage Description None 05/21/24 1147   Drainage Amount None 05/21/24 1147   Dressing Other (Comment) 05/21/24 0800   Dressing Changed Changed 05/20/24 1400   Dressing Status Clean;Dry 05/21/24 1147       Wound Team Summary Assessment: RLQ ileostomy pouch intact. No leakage noted. Dressing dry and intact to midline incision. LLQ ABIEL drain with serosanguinous drainage. Patient given educational folder and reviewed. Patient also instructed in signs and symptoms of dehydration, food blockage, dietary guidelines. All questions answered to patient's satisfaction.      Wound Team Plan: Will follow up tomorrow for pouch change and education.      Kacy Johnson RN  5/21/2024  11:50 AM

## 2024-05-22 LAB
ANION GAP SERPL CALC-SCNC: 14 MMOL/L (ref 10–20)
BUN SERPL-MCNC: 9 MG/DL (ref 6–23)
CALCIUM SERPL-MCNC: 9.7 MG/DL (ref 8.6–10.3)
CHLORIDE SERPL-SCNC: 98 MMOL/L (ref 98–107)
CO2 SERPL-SCNC: 28 MMOL/L (ref 21–32)
CREAT SERPL-MCNC: 0.68 MG/DL (ref 0.5–1.05)
EGFRCR SERPLBLD CKD-EPI 2021: >90 ML/MIN/1.73M*2
ERYTHROCYTE [DISTWIDTH] IN BLOOD BY AUTOMATED COUNT: 14 % (ref 11.5–14.5)
GLUCOSE SERPL-MCNC: 127 MG/DL (ref 74–99)
HCT VFR BLD AUTO: 37.4 % (ref 36–46)
HGB BLD-MCNC: 12.2 G/DL (ref 12–16)
MAGNESIUM SERPL-MCNC: 1.85 MG/DL (ref 1.6–2.4)
MCH RBC QN AUTO: 29.2 PG (ref 26–34)
MCHC RBC AUTO-ENTMCNC: 32.6 G/DL (ref 32–36)
MCV RBC AUTO: 90 FL (ref 80–100)
NRBC BLD-RTO: 0 /100 WBCS (ref 0–0)
PHOSPHATE SERPL-MCNC: 3.1 MG/DL (ref 2.5–4.9)
PLATELET # BLD AUTO: 360 X10*3/UL (ref 150–450)
POTASSIUM SERPL-SCNC: 3.5 MMOL/L (ref 3.5–5.3)
RBC # BLD AUTO: 4.18 X10*6/UL (ref 4–5.2)
SODIUM SERPL-SCNC: 136 MMOL/L (ref 136–145)
WBC # BLD AUTO: 13.8 X10*3/UL (ref 4.4–11.3)

## 2024-05-22 PROCEDURE — 85027 COMPLETE CBC AUTOMATED: CPT | Performed by: STUDENT IN AN ORGANIZED HEALTH CARE EDUCATION/TRAINING PROGRAM

## 2024-05-22 PROCEDURE — 99231 SBSQ HOSP IP/OBS SF/LOW 25: CPT | Performed by: STUDENT IN AN ORGANIZED HEALTH CARE EDUCATION/TRAINING PROGRAM

## 2024-05-22 PROCEDURE — 1100000001 HC PRIVATE ROOM DAILY

## 2024-05-22 PROCEDURE — 80048 BASIC METABOLIC PNL TOTAL CA: CPT | Performed by: STUDENT IN AN ORGANIZED HEALTH CARE EDUCATION/TRAINING PROGRAM

## 2024-05-22 PROCEDURE — 2500000001 HC RX 250 WO HCPCS SELF ADMINISTERED DRUGS (ALT 637 FOR MEDICARE OP): Performed by: STUDENT IN AN ORGANIZED HEALTH CARE EDUCATION/TRAINING PROGRAM

## 2024-05-22 PROCEDURE — 83735 ASSAY OF MAGNESIUM: CPT | Performed by: STUDENT IN AN ORGANIZED HEALTH CARE EDUCATION/TRAINING PROGRAM

## 2024-05-22 PROCEDURE — 2500000004 HC RX 250 GENERAL PHARMACY W/ HCPCS (ALT 636 FOR OP/ED): Performed by: STUDENT IN AN ORGANIZED HEALTH CARE EDUCATION/TRAINING PROGRAM

## 2024-05-22 PROCEDURE — 84100 ASSAY OF PHOSPHORUS: CPT | Performed by: STUDENT IN AN ORGANIZED HEALTH CARE EDUCATION/TRAINING PROGRAM

## 2024-05-22 PROCEDURE — 36415 COLL VENOUS BLD VENIPUNCTURE: CPT | Performed by: STUDENT IN AN ORGANIZED HEALTH CARE EDUCATION/TRAINING PROGRAM

## 2024-05-22 PROCEDURE — 2500000006 HC RX 250 W HCPCS SELF ADMINISTERED DRUGS (ALT 637 FOR ALL PAYERS): Performed by: STUDENT IN AN ORGANIZED HEALTH CARE EDUCATION/TRAINING PROGRAM

## 2024-05-22 RX ADMIN — ACETAMINOPHEN 650 MG: 325 TABLET ORAL at 22:59

## 2024-05-22 RX ADMIN — GABAPENTIN 300 MG: 300 CAPSULE ORAL at 15:26

## 2024-05-22 RX ADMIN — ROSUVASTATIN CALCIUM 5 MG: 5 TABLET, FILM COATED ORAL at 20:41

## 2024-05-22 RX ADMIN — GABAPENTIN 300 MG: 300 CAPSULE ORAL at 20:40

## 2024-05-22 RX ADMIN — ENOXAPARIN SODIUM 40 MG: 40 INJECTION SUBCUTANEOUS at 08:06

## 2024-05-22 RX ADMIN — PANTOPRAZOLE SODIUM 40 MG: 40 TABLET, DELAYED RELEASE ORAL at 05:17

## 2024-05-22 RX ADMIN — ASPIRIN 81 MG 81 MG: 81 TABLET ORAL at 08:06

## 2024-05-22 RX ADMIN — GABAPENTIN 300 MG: 300 CAPSULE ORAL at 08:06

## 2024-05-22 RX ADMIN — ACETAMINOPHEN 650 MG: 325 TABLET ORAL at 11:46

## 2024-05-22 RX ADMIN — ACETAMINOPHEN 650 MG: 325 TABLET ORAL at 17:38

## 2024-05-22 RX ADMIN — METOPROLOL SUCCINATE 25 MG: 25 TABLET, EXTENDED RELEASE ORAL at 08:06

## 2024-05-22 RX ADMIN — ACETAMINOPHEN 650 MG: 325 TABLET ORAL at 05:17

## 2024-05-22 ASSESSMENT — COGNITIVE AND FUNCTIONAL STATUS - GENERAL
MOBILITY SCORE: 24
DAILY ACTIVITIY SCORE: 24

## 2024-05-22 ASSESSMENT — PAIN - FUNCTIONAL ASSESSMENT
PAIN_FUNCTIONAL_ASSESSMENT: 0-10

## 2024-05-22 ASSESSMENT — PAIN SCALES - GENERAL
PAINLEVEL_OUTOF10: 3
PAINLEVEL_OUTOF10: 2
PAINLEVEL_OUTOF10: 3
PAINLEVEL_OUTOF10: 2
PAINLEVEL_OUTOF10: 3

## 2024-05-22 ASSESSMENT — PAIN DESCRIPTION - ORIENTATION: ORIENTATION: MID

## 2024-05-22 ASSESSMENT — PAIN DESCRIPTION - LOCATION: LOCATION: ABDOMEN

## 2024-05-22 NOTE — CARE PLAN
The patient's goals for the shift include      The clinical goals for the shift include will nget adequate rest this shift. Pain will be managed at tolerable level. Patient shawna ambulate 3 timesw this shift.      Problem: Pain - Adult  Goal: Verbalizes/displays adequate comfort level or baseline comfort level  Outcome: Progressing     Problem: Safety - Adult  Goal: Free from fall injury  Outcome: Progressing     Problem: Pain  Goal: Takes deep breaths with improved pain control throughout the shift  Outcome: Progressing  Goal: Turns in bed with improved pain control throughout the shift  Outcome: Progressing  Goal: Walks with improved pain control throughout the shift  Outcome: Progressing  Goal: Performs ADL's with improved pain control throughout shift  Outcome: Progressing  Goal: Participates in PT with improved pain control throughout the shift  Outcome: Progressing  Goal: Free from opioid side effects throughout the shift  Outcome: Progressing  Goal: Free from acute confusion related to pain meds throughout the shift  Outcome: Progressing     Problem: Fall/Injury  Goal: Not fall by end of shift  Outcome: Progressing  Goal: Be free from injury by end of the shift  Outcome: Progressing  Goal: Verbalize understanding of personal risk factors for fall in the hospital  Outcome: Progressing  Goal: Verbalize understanding of risk factor reduction measures to prevent injury from fall in the home  Outcome: Progressing  Goal: Use assistive devices by end of the shift  Outcome: Progressing  Goal: Pace activities to prevent fatigue by end of the shift  Outcome: Progressing

## 2024-05-22 NOTE — PROGRESS NOTES
Janis Ibanez 68 y.o. female    Subjective  Afebrile O/N.  No acute events O/N.  Pain controlled.  Denies nausea or emesis.  Tolerated low fiber diet.  Passed Bms yesterday per anus.  Denies dysuria.  Ambulating in hallways.    Ileostomy 1800cc  ABIEL 15cc    Objective  PHYSICAL EXAM:  Physical Exam  Vitals reviewed.   Constitutional:       General: He is awake.      Appearance: Normal appearance.   Pulmonary:      Effort: Pulmonary effort is normal.      Breath sounds: Normal air entry.   Abdominal:      General: Abdomen is flat. There is no distension.      Palpations: Abdomen is soft.      Tenderness: There is no guarding or rebound.      Comments:  Incision covered with C/D dressing, LUQ ABIEL with SS output, RLQ ileostomy pink with gas and bilious liquid output. Appropriately TTP.    Genitourinary:     Comments: Sin catheter absent   Musculoskeletal:         General: Normal range of motion.      Cervical back: Normal range of motion.   Skin:     General: Skin is warm and dry.   Neurological:      General: No focal deficit present.      Mental Status: He is alert and oriented to person, place, and time.   Psychiatric:         Behavior: Behavior is cooperative.         Vital signs in last 24 hours:  Vitals:    05/22/24 0400   BP: 145/84   Pulse: 85   Resp: 18   Temp: 36 °C (96.8 °F)   SpO2: 95%         Intake/Output this shift:    Intake/Output Summary (Last 24 hours) at 5/22/2024 0747  Last data filed at 5/22/2024 0500  Gross per 24 hour   Intake --   Output 2940 ml   Net -2940 ml        Allergies:  No Known Allergies     Medications:  Scheduled medications  acetaminophen, 650 mg, oral, q6h  aspirin, 81 mg, oral, Daily  enoxaparin, 40 mg, subcutaneous, q24h  gabapentin, 300 mg, oral, TID  metoprolol succinate XL, 25 mg, oral, Daily  pantoprazole, 40 mg, oral, Daily before breakfast  rosuvastatin, 5 mg, oral, Nightly      Continuous medications     PRN medications  PRN medications: naloxone, ondansetron ODT  **OR** ondansetron, oxyCODONE, oxygen       Labs:  Results for orders placed or performed during the hospital encounter of 05/17/24 (from the past 24 hour(s))   Magnesium   Result Value Ref Range    Magnesium 1.85 1.60 - 2.40 mg/dL   CBC   Result Value Ref Range    WBC 13.8 (H) 4.4 - 11.3 x10*3/uL    nRBC 0.0 0.0 - 0.0 /100 WBCs    RBC 4.18 4.00 - 5.20 x10*6/uL    Hemoglobin 12.2 12.0 - 16.0 g/dL    Hematocrit 37.4 36.0 - 46.0 %    MCV 90 80 - 100 fL    MCH 29.2 26.0 - 34.0 pg    MCHC 32.6 32.0 - 36.0 g/dL    RDW 14.0 11.5 - 14.5 %    Platelets 360 150 - 450 x10*3/uL   Basic Metabolic Panel   Result Value Ref Range    Glucose 127 (H) 74 - 99 mg/dL    Sodium 136 136 - 145 mmol/L    Potassium 3.5 3.5 - 5.3 mmol/L    Chloride 98 98 - 107 mmol/L    Bicarbonate 28 21 - 32 mmol/L    Anion Gap 14 10 - 20 mmol/L    Urea Nitrogen 9 6 - 23 mg/dL    Creatinine 0.68 0.50 - 1.05 mg/dL    eGFR >90 >60 mL/min/1.73m*2    Calcium 9.7 8.6 - 10.3 mg/dL   Phosphorus   Result Value Ref Range    Phosphorus 3.1 2.5 - 4.9 mg/dL        Imaging:  No results found.     Plan   Rectal cancer (Multi)     POD#5  Open low anterior resection  Mobilization splenic flexure  29 EEA CRA  Flexible sigmoidoscopy  Diverting loop ileostomy     #Rectal cancer  -  S/P above surgery  -  Continue low fiber/soft  -  DC IVF 5/19  -  Multimodal pain regimen   -  DC villalobos 5/18  -  Continue operative drain  -  Encourage IS and ambulation  -  Stoma nurse on for education - nai removed 5/20    #Hypophosphatemia  -  WNL    #HTN  -  BP acceptable  -  Home metoprolol resumed  -  Home 81mg ASA resumed  -  Holding lisinopril     #GERD  -  Protonix ordered in place of home omeprazole     #HLD  -  Home rosuvastatin resumed     -  Daily labs  -  Home healthcare arrangements  -  Hold off on DC today due to stoma output and bump in WBC today      Gustavo Harp MD    I spent 15 minutes in the professional and overall care of this patient.

## 2024-05-22 NOTE — CONSULTS
Wound Care Consult     Visit Date: 5/22/2024      Patient Name: Marquita Ibanez         MRN: [id number]           YOB: 1955     Reason for Consult: Ostomy Care and Education        Wound History: Closed Surgical Wound      Pertinent Labs:   Albumin   Date Value Ref Range Status   03/28/2024 3.8 3.4 - 5.0 g/dL Final       Wound Assessment:  Wound 05/17/24 Incision Abdomen Medial;Upper (Active)   Site Assessment Unable to assess;Other (Comment) 05/22/24 1441   Ingrid-Wound Assessment Dry;Intact 05/22/24 0800   Margins Unable to assess 05/20/24 2130   Closure Unable to assess 05/20/24 2130   Sutures/Staple Line Approximated 05/20/24 1400   Drainage Description None 05/22/24 1441   Drainage Amount None 05/22/24 1441   Dressing Dry dressing 05/22/24 1441   Dressing Changed Changed 05/20/24 1400   Dressing Status Clean;Dry 05/22/24 1441       Wound Team Summary Assessment: Stoma red, moist. Pouch intact, brown liquid stool in pouch. Patient states that she independently emptied pouch in bathroom. LLQ ABIEL drain with serosanguinous drainage. Midline dressing clean, dry and intact. Patient instructed on signs and symptoms of dehydration. All questions answered to patient's satisfaction.      Wound Team Plan: Pouch change tomorrow, patient to return demonstration.      Kacy Johnson RN  5/22/2024  2:43 PM

## 2024-05-23 ENCOUNTER — HOME HEALTH ADMISSION (OUTPATIENT)
Dept: HOME HEALTH SERVICES | Facility: HOME HEALTH | Age: 69
End: 2024-05-23
Payer: COMMERCIAL

## 2024-05-23 ENCOUNTER — DOCUMENTATION (OUTPATIENT)
Dept: HOME HEALTH SERVICES | Facility: HOME HEALTH | Age: 69
End: 2024-05-23
Payer: COMMERCIAL

## 2024-05-23 VITALS
DIASTOLIC BLOOD PRESSURE: 66 MMHG | TEMPERATURE: 98.4 F | OXYGEN SATURATION: 96 % | SYSTOLIC BLOOD PRESSURE: 158 MMHG | HEART RATE: 83 BPM | BODY MASS INDEX: 28.79 KG/M2 | WEIGHT: 178.35 LBS | RESPIRATION RATE: 16 BRPM

## 2024-05-23 LAB
ANION GAP SERPL CALC-SCNC: 13 MMOL/L (ref 10–20)
BUN SERPL-MCNC: 9 MG/DL (ref 6–23)
CALCIUM SERPL-MCNC: 9.3 MG/DL (ref 8.6–10.3)
CHLORIDE SERPL-SCNC: 100 MMOL/L (ref 98–107)
CO2 SERPL-SCNC: 26 MMOL/L (ref 21–32)
CREAT SERPL-MCNC: 0.67 MG/DL (ref 0.5–1.05)
EGFRCR SERPLBLD CKD-EPI 2021: >90 ML/MIN/1.73M*2
ERYTHROCYTE [DISTWIDTH] IN BLOOD BY AUTOMATED COUNT: 14 % (ref 11.5–14.5)
GLUCOSE SERPL-MCNC: 111 MG/DL (ref 74–99)
HCT VFR BLD AUTO: 34.1 % (ref 36–46)
HGB BLD-MCNC: 11 G/DL (ref 12–16)
MAGNESIUM SERPL-MCNC: 1.75 MG/DL (ref 1.6–2.4)
MCH RBC QN AUTO: 28.7 PG (ref 26–34)
MCHC RBC AUTO-ENTMCNC: 32.3 G/DL (ref 32–36)
MCV RBC AUTO: 89 FL (ref 80–100)
NRBC BLD-RTO: 0 /100 WBCS (ref 0–0)
PHOSPHATE SERPL-MCNC: 3.3 MG/DL (ref 2.5–4.9)
PLATELET # BLD AUTO: 325 X10*3/UL (ref 150–450)
POTASSIUM SERPL-SCNC: 3.9 MMOL/L (ref 3.5–5.3)
RBC # BLD AUTO: 3.83 X10*6/UL (ref 4–5.2)
SODIUM SERPL-SCNC: 135 MMOL/L (ref 136–145)
WBC # BLD AUTO: 13.2 X10*3/UL (ref 4.4–11.3)

## 2024-05-23 PROCEDURE — 2500000001 HC RX 250 WO HCPCS SELF ADMINISTERED DRUGS (ALT 637 FOR MEDICARE OP): Performed by: STUDENT IN AN ORGANIZED HEALTH CARE EDUCATION/TRAINING PROGRAM

## 2024-05-23 PROCEDURE — 2500000004 HC RX 250 GENERAL PHARMACY W/ HCPCS (ALT 636 FOR OP/ED): Performed by: STUDENT IN AN ORGANIZED HEALTH CARE EDUCATION/TRAINING PROGRAM

## 2024-05-23 PROCEDURE — 83735 ASSAY OF MAGNESIUM: CPT | Performed by: STUDENT IN AN ORGANIZED HEALTH CARE EDUCATION/TRAINING PROGRAM

## 2024-05-23 PROCEDURE — 99024 POSTOP FOLLOW-UP VISIT: CPT | Performed by: STUDENT IN AN ORGANIZED HEALTH CARE EDUCATION/TRAINING PROGRAM

## 2024-05-23 PROCEDURE — 84100 ASSAY OF PHOSPHORUS: CPT | Performed by: STUDENT IN AN ORGANIZED HEALTH CARE EDUCATION/TRAINING PROGRAM

## 2024-05-23 PROCEDURE — 36415 COLL VENOUS BLD VENIPUNCTURE: CPT | Performed by: STUDENT IN AN ORGANIZED HEALTH CARE EDUCATION/TRAINING PROGRAM

## 2024-05-23 PROCEDURE — 80048 BASIC METABOLIC PNL TOTAL CA: CPT | Performed by: STUDENT IN AN ORGANIZED HEALTH CARE EDUCATION/TRAINING PROGRAM

## 2024-05-23 PROCEDURE — 85027 COMPLETE CBC AUTOMATED: CPT | Performed by: STUDENT IN AN ORGANIZED HEALTH CARE EDUCATION/TRAINING PROGRAM

## 2024-05-23 RX ADMIN — ACETAMINOPHEN 650 MG: 325 TABLET ORAL at 05:26

## 2024-05-23 RX ADMIN — PANTOPRAZOLE SODIUM 40 MG: 40 TABLET, DELAYED RELEASE ORAL at 06:21

## 2024-05-23 RX ADMIN — GABAPENTIN 300 MG: 300 CAPSULE ORAL at 08:46

## 2024-05-23 RX ADMIN — METOPROLOL SUCCINATE 25 MG: 25 TABLET, EXTENDED RELEASE ORAL at 08:46

## 2024-05-23 RX ADMIN — ASPIRIN 81 MG 81 MG: 81 TABLET ORAL at 08:46

## 2024-05-23 RX ADMIN — ACETAMINOPHEN 650 MG: 325 TABLET ORAL at 12:17

## 2024-05-23 RX ADMIN — ENOXAPARIN SODIUM 40 MG: 40 INJECTION SUBCUTANEOUS at 08:45

## 2024-05-23 ASSESSMENT — PAIN - FUNCTIONAL ASSESSMENT
PAIN_FUNCTIONAL_ASSESSMENT: 0-10
PAIN_FUNCTIONAL_ASSESSMENT: 0-10

## 2024-05-23 ASSESSMENT — PAIN SCALES - GENERAL
PAINLEVEL_OUTOF10: 3
PAINLEVEL_OUTOF10: 3

## 2024-05-23 ASSESSMENT — PAIN DESCRIPTION - LOCATION: LOCATION: ABDOMEN

## 2024-05-23 ASSESSMENT — PAIN DESCRIPTION - ORIENTATION: ORIENTATION: MID

## 2024-05-23 NOTE — CARE PLAN
The patient's goals for the shift include      The clinical goals for the shift include remain clinically stable

## 2024-05-23 NOTE — HH CARE COORDINATION
Home Care received a Referral for Nursing. We have processed the referral for a Start of Care on 5.24.2024.     If you have any questions or concerns, please feel free to contact us at [phone]. Follow the prompts, enter your five digit zip code, and you will be directed to your care team on [facility].

## 2024-05-23 NOTE — CONSULTS
Wound Care Consult     Visit Date: 5/23/2024      Patient Name: Marquita Ibanez         MRN: [id number]           YOB: 1955     Reason for Consult: Ostomy Care and Education        Wound History: Closed Surgical Incision     Pertinent Labs:   Albumin   Date Value Ref Range Status   03/28/2024 3.8 3.4 - 5.0 g/dL Final       Wound Assessment:  Wound 05/17/24 Incision Abdomen Medial;Upper (Active)   Site Assessment Clean;Dry;Intact 05/23/24 1300   Ingrid-Wound Assessment Clean;Dry;Intact 05/23/24 1300   Margins Attached edges;Well-defined edges 05/23/24 1300   Closure Staples 05/23/24 1300   Sutures/Staple Line Approximated 05/23/24 1300   Drainage Description None 05/23/24 1300   Drainage Amount None 05/23/24 1300   Dressing Open to air 05/23/24 1300   Dressing Changed Changed 05/20/24 1400   Dressing Status Clean;Dry 05/22/24 2045       Wound Team Summary Assessment: ABIEL drain out, midline incision well-approximated with staples intact, no drainage noted. Patient independently emptying pouch, Patient instructed step by step on how to change pouch. Patient changed pouch with moderate amount of assistance.' Verbalizes understanding of step of hoe to do pouch change. Supplies ordered from DME of patient's choosing. Patient given home going supplies. All questions answered to patient's satisfaction.      Wound Team Plan: Patient to be discharged with home health care.      Kacy Johnson RN  5/23/2024  1:25 PM

## 2024-05-23 NOTE — CARE PLAN
The patient's goals for the shift include      The clinical goals for the shift include remain clinically stable      Problem: Pain - Adult  Goal: Verbalizes/displays adequate comfort level or baseline comfort level  Outcome: Progressing     Problem: Pain  Goal: Takes deep breaths with improved pain control throughout the shift  Outcome: Progressing  Goal: Turns in bed with improved pain control throughout the shift  Outcome: Progressing  Goal: Walks with improved pain control throughout the shift  Outcome: Progressing  Goal: Performs ADL's with improved pain control throughout shift  Outcome: Progressing  Goal: Participates in PT with improved pain control throughout the shift  Outcome: Progressing  Goal: Free from opioid side effects throughout the shift  Outcome: Progressing  Goal: Free from acute confusion related to pain meds throughout the shift  Outcome: Progressing

## 2024-05-23 NOTE — PROGRESS NOTES
Pt has a dc order to dc home with [facility] [place]. Pt does not have a PCP. Message sent to Dr. Harp inquiring if he will follow while pt is active with [facility] [place]. Awaiting response.     5885 Dr. Harp agreeable to follow. [place] [address] intake nurse DILAN Maldonado notified of discharge, referral and following MD.    7686 Met with pt to review dc plan. Pt has a ride home. Requesting a walker script in case she feels she needs it upon return home. Bedside RN updated and will request script.     1210 SOC planned for 5/24 per [facility] [place].

## 2024-05-23 NOTE — PROGRESS NOTES
Janis Ibanez 68 y.o. female    Subjective  Afebrile O/N.  No acute events O/N.  Pain controlled.  Denies nausea or emesis.  Tolerated low fiber diet.  Still passing Bms per rectum; some were bloody yesterday.  Denies dysuria.  Ambulating in hallways.    Ileostomy 550cc  ABIEL 0cc recorded    Objective  PHYSICAL EXAM:  Physical Exam  Vitals reviewed.   Constitutional:       General: He is awake.      Appearance: Normal appearance.   Pulmonary:      Effort: Pulmonary effort is normal.      Breath sounds: Normal air entry.   Abdominal:      General: Abdomen is flat. There is no distension.      Palpations: Abdomen is soft.      Tenderness: There is no guarding or rebound.      Comments:  Incision C/D/I w/ lupillo, LUQ ABIEL with scant SS output, RLQ ileostomy pink with gas and bilious semi-formed liquid output.  Appropriately TTP.    Genitourinary:     Comments: Sin catheter absent   Musculoskeletal:         General: Normal range of motion.      Cervical back: Normal range of motion.   Skin:     General: Skin is warm and dry.   Neurological:      General: No focal deficit present.      Mental Status: He is alert and oriented to person, place, and time.   Psychiatric:         Behavior: Behavior is cooperative.         Vital signs in last 24 hours:  Vitals:    05/23/24 0400   BP: 134/63   Pulse: 83   Resp: 16   Temp: 36.1 °C (97 °F)   SpO2: 96%         Intake/Output this shift:    Intake/Output Summary (Last 24 hours) at 5/23/2024 0737  Last data filed at 5/22/2024 2000  Gross per 24 hour   Intake --   Output 1450 ml   Net -1450 ml        Allergies:  No Known Allergies     Medications:  Scheduled medications  acetaminophen, 650 mg, oral, q6h  aspirin, 81 mg, oral, Daily  enoxaparin, 40 mg, subcutaneous, q24h  gabapentin, 300 mg, oral, TID  metoprolol succinate XL, 25 mg, oral, Daily  pantoprazole, 40 mg, oral, Daily before breakfast  rosuvastatin, 5 mg, oral, Nightly      Continuous medications     PRN  medications  PRN medications: naloxone, ondansetron ODT **OR** ondansetron, oxyCODONE, oxygen       Labs:  Results for orders placed or performed during the hospital encounter of 05/17/24 (from the past 24 hour(s))   CBC   Result Value Ref Range    WBC 13.2 (H) 4.4 - 11.3 x10*3/uL    nRBC 0.0 0.0 - 0.0 /100 WBCs    RBC 3.83 (L) 4.00 - 5.20 x10*6/uL    Hemoglobin 11.0 (L) 12.0 - 16.0 g/dL    Hematocrit 34.1 (L) 36.0 - 46.0 %    MCV 89 80 - 100 fL    MCH 28.7 26.0 - 34.0 pg    MCHC 32.3 32.0 - 36.0 g/dL    RDW 14.0 11.5 - 14.5 %    Platelets 325 150 - 450 x10*3/uL        Imaging:  No results found.     Plan   Rectal cancer (Multi)     POD#6  Open low anterior resection  Mobilization splenic flexure  29 EEA CRA  Flexible sigmoidoscopy  Diverting loop ileostomy     #Rectal cancer  -  S/P above surgery  -  Continue low fiber/soft  -  DC IVF 5/19  -  Multimodal pain regimen   -  DC villalobos 5/18  -  Remove operative drain today  -  Encourage IS and ambulation  -  Stoma nurse on for education - nia removed 5/20    #Hypophosphatemia  -  Awaiting lab results today    #HTN  -  BP acceptable  -  Home metoprolol resumed  -  Home 81mg ASA resumed  -  Holding lisinopril     #GERD  -  Protonix ordered in place of home omeprazole     #HLD  -  Home rosuvastatin resumed     -  Plan for DC today      Gustavo Harp MD    I spent 15 minutes in the professional and overall care of this patient.

## 2024-05-23 NOTE — CARE PLAN
The patient's goals for the shift include      The clinical goals for the shift include remain clinically stable      Over the shift, the patient had an uneventful shift. Pt had no need for prn medication. Remained clinically stable and safe throughout the shift.

## 2024-05-23 NOTE — DISCHARGE SUMMARY
Discharge Diagnosis  Rectal cancer (Multi)  Attention to ileostomy  Hypophosphatemia  Hypertension  Gastroesophageal reflux disease  Hyperlipidemia    Issues Requiring Follow-Up  Post-operative follow-up  Attention to ileostomy    Test Results Pending At Discharge  Pending Labs       Order Current Status    Surgical Pathology Exam In process            Hospital Course  Patient was taken to the operating room on day of admission, 5/17/2024, for robotic converted to open low anterior resection with mobilization of splenic flexure, 29 EEA colorectal anastomosis, flexible sigmoidoscopy, and diverting loop ileostomy.  Patient tolerated the surgery well and postoperatively was initiated on the ERS protocol.  Patient was taken to the regular nursing floor.  Patient was initially kept n.p.o., placed on intravenous fluids, given multimodal pain management regimen and DVT chemoprophylaxis.  Home metoprolol and 81 mg aspirin were resumed.  Home lisinopril was held.  Patient was placed on Protonix in place of home omeprazole.  Home rosuvastatin was resumed.  On postoperative day 1, patient was advanced to a clear liquid diet.  Intravenous fluids were continued.  Sin catheter was removed.  On postoperative day 2, intravenous fluids were discontinued and patient was transitioned from PCA to an oral pain management regimen.  On postoperative day 3, patient was noted to have hypophosphatemia and this was repleted.  She was advanced to full liquid diet.  Stoma nia was removed.  On postoperative day 4, patient was advanced to a low fiber soft diet.  On postoperative day 5, discharge was held due to high ileostomy output.  By postoperative day 6, patient's ileostomy output had normalized to an acceptable level.  Operative drain was removed.  Patient was deemed appropriate for discharge and thus discharged to home with instructions for wound care, pain control, diet, activity, and follow-up.  During the patient's hospitalization, she  was evaluated by the wound ostomy care nurse.  Patient was discharged to home with home health care.    Pertinent Physical Exam At Time of Discharge  Physical Exam  See progress note from day of discharge    Home Medications     Medication List      START taking these medications     acetaminophen 325 mg tablet; Commonly known as: Tylenol; Take 2 tablets   (650 mg) by mouth every 6 hours if needed for mild pain (1 - 3).     CHANGE how you take these medications     rosuvastatin 5 mg tablet; Commonly known as: Crestor; Take 1 tablet (5   mg) by mouth once daily.; What changed: when to take this     CONTINUE taking these medications     aspirin 81 mg chewable tablet; Chew 1 tablet (81 mg) once daily.   lisinopril 10 mg tablet; Take 1 tablet (10 mg) by mouth 2 times a day.   metoprolol succinate XL 25 mg 24 hr tablet; Commonly known as:   Toprol-XL; Take 1 tablet (25 mg) by mouth once daily. Do not crush or   chew.   omeprazole 20 mg DR capsule; Commonly known as: PriLOSEC   prochlorperazine 5 mg tablet; Commonly known as: Compazine   VITAMIN B-12 ORAL     STOP taking these medications     chlorhexidine 0.12 % solution; Commonly known as: Peridex   docusate sodium 100 mg capsule; Commonly known as: Colace   gabapentin 100 mg capsule; Commonly known as: Neurontin   lidocaine 5 % ointment; Commonly known as: Xylocaine   metroNIDAZOLE 250 mg tablet; Commonly known as: Flagyl   neomycin 500 mg tablet; Commonly known as: Mycifradin       Outpatient Follow-Up  Future Appointments   Date Time Provider Department Center   5/24/2024 To Be Determined Kandice Escobar RN [facility]   11/12/2024 10:15 AM Kolton Lam MD [id number] [place]       Gustavo Harp MD

## 2024-05-24 ENCOUNTER — HOME CARE VISIT (OUTPATIENT)
Dept: HOME HEALTH SERVICES | Facility: HOME HEALTH | Age: 69
End: 2024-05-24
Payer: COMMERCIAL

## 2024-05-24 VITALS
HEART RATE: 75 BPM | SYSTOLIC BLOOD PRESSURE: 126 MMHG | TEMPERATURE: 97.5 F | OXYGEN SATURATION: 98 % | DIASTOLIC BLOOD PRESSURE: 58 MMHG | RESPIRATION RATE: 18 BRPM

## 2024-05-24 PROCEDURE — G0299 HHS/HOSPICE OF RN EA 15 MIN: HCPCS

## 2024-05-24 PROCEDURE — 0023 HH SOC

## 2024-05-24 ASSESSMENT — LIFESTYLE VARIABLES: SMOKING_STATUS: 0

## 2024-05-24 ASSESSMENT — ENCOUNTER SYMPTOMS
PAIN LOCATION - PAIN SEVERITY: 3/10
MUSCLE WEAKNESS: 1
PERSON REPORTING PAIN: PATIENT
LOWEST PAIN SEVERITY IN PAST 24 HOURS: 1/10
CHANGE IN APPETITE: UNCHANGED
STOOL FREQUENCY: DAILY
HIGHEST PAIN SEVERITY IN PAST 24 HOURS: 6/10
RECTAL BLEEDING: 1
PAIN LOCATION - PAIN QUALITY: SHARP
PAIN LOCATION - PAIN FREQUENCY: FREQUENT
APPETITE LEVEL: GOOD
PAIN SEVERITY GOAL: 0/10
FATIGUE: 1
FATIGUES EASILY: 1
PAIN LOCATION: ABDOMEN
ABDOMINAL PAIN: 1
SUBJECTIVE PAIN PROGRESSION: GRADUALLY IMPROVING
PAIN: 1

## 2024-05-24 ASSESSMENT — ACTIVITIES OF DAILY LIVING (ADL)
ENTERING_EXITING_HOME: MODERATE ASSIST
OASIS_M1830: 05

## 2024-05-24 NOTE — OP NOTE
"ROBOTIC CONVERTED TO OPEN LOWER ANTERIOR RESECTION, WITH LOOP ILEOSTOMY, FLEXIBLE SIGMOIDOSOPY, MOBILIZATION OF SPLENIC FLEXURE Operative Note     Date: 2024  OR Location: [facility]    Name: Janis Ibanez \"Marquita\", : 1955, Age: 68 y.o., MRN: [id number], Sex: female    Diagnosis  Pre-op Diagnosis     * Rectal cancer (Multi) [C20] Post-op Diagnosis     * Rectal cancer (Multi) [C20]     Procedures  ROBOTIC CONVERTED TO OPEN LOWER ANTERIOR RESECTION, WITH LOOP ILEOSTOMY, FLEXIBLE SIGMOIDOSOPY, MOBILIZATION OF SPLENIC FLEXURE  [id number] - IL LAP,SURG,COLECTOMY, PARTIAL, W/ANAST      Surgeons      * Gustavo Harp - Primary    Resident/Fellow/Other Assistant:  Surgeons and Role:     * Iraida Lewis MD - Assisting    Procedure Summary  Anesthesia: General  ASA: III  Anesthesia Staff: Anesthesiologist: Scottie Castillo MD  CRNA: SHARMILA Feliciano-CRNA  Estimated Blood Loss: 200mL  Intra-op Medications:   Administrations occurring from 0910 to 1500 on 24:   Medication Name Total Dose   bupivacaine PF (Marcaine) 0.5 % (5 mg/mL) injection 10 mL   sodium chloride 0.9 % irrigation solution 1,000 mL              Anesthesia Record               Intraprocedure I/O Totals          Intake    electrolyte-A (Plasmalyte-A) 1500.00 mL    LR infusion 2000.00 mL    metroNIDAZOLE 500 mg/100 mL 100.00 mL    Total Intake 3600 mL       Output    Urine 300 mL    Est. Blood Loss 150 mL    Total Output 450 mL       Net    Net Volume 3150 mL          Specimen:   ID Type Source Tests Collected by Time   1 : LOWER ANTERIOR RESECTION AND ANASTAMOTIC DONUTS Tissue COLON - SIGMOID RESECTION SURGICAL PATHOLOGY EXAM Gustavo Harp MD 2024 1219        Staff:   Circulator: Jillian Brito Person: Susan Brito Person: Bartolo Coe Circulator: Kandice Coe Scrub: Ann         Drains and/or Catheters:   Ileostomy Loop RUQ (Active)   Stomal Appliance 2 piece 24 1300   Site/Stoma Assessment Red 24 1300 "   Peristomal Assessment Clean;Intact;Pink 05/23/24 1300   Treatment Pouch change 05/23/24 1300   Output (mL) 100 mL 05/23/24 1300   Output Description Brown;Liquid 05/23/24 1300       [REMOVED] Closed/Suction Drain LUQ 15 Fr. (Removed)   Site Description Healing 05/23/24 0900   Dressing Status Removed 05/23/24 0900   Drainage Appearance Serosanguineous 05/22/24 0800   Status To bulb suction 05/22/24 0800   Output (mL) 15 mL 05/21/24 2000       [REMOVED] Urethral Catheter Non-latex 16 Fr. (Removed)   Site Assessment Clean;Skin intact 05/18/24 0910   Collection Container Standard drainage bag 05/18/24 0910   Securement Method Securing device (Describe) 05/18/24 0910   Reason for Continuing Urinary Catheterization surgical procedures: urological/gynecological, pelvic oncology, anal, prolonged surgical procedure 05/18/24 0910   Output (mL) 250 mL 05/18/24 1019       Tourniquet Times:         Implants:     Findings: Rectal mass, adhesions and pelvic fibrosis, left upper quadrant adhesions.    Indications: Marquita Ibanez is an 68 y.o. female who is having surgery for Rectal cancer (Multi) [C20].     The patient was seen in the preoperative area. The risks, benefits, complications, treatment options, non-operative alternatives, expected recovery and outcomes were discussed with the patient. The possibilities of reaction to medication, pulmonary aspiration, injury to surrounding structures, bleeding, recurrent infection, the need for additional procedures, failure to diagnose a condition, and creating a complication requiring transfusion or operation were discussed with the patient. The patient concurred with the proposed plan, giving informed consent.  The site of surgery was properly noted/marked if necessary per policy. The patient has been actively warmed in preoperative area. Preoperative antibiotics have been ordered and given within 1 hours of incision. Venous thrombosis prophylaxis have been ordered including  bilateral sequential compression devices and chemical prophylaxis    Procedure Details:   Safety checklist was performed confirming correct patient correct procedure.  Patient was brought to the operating room.  Sequential compression devices were applied to bilateral lower extremities.  Following induction of general anesthesia, the patient was placed in lithotomy position.  Bilateral arms were gently tucked.  Careful attention was paid to padding of pressure points.  A 16 Haitian Sin catheter was inserted under sterile technique.  Patient's abdomen was prepped with ChloraPrep and the patient draped in the usual sterile fashion.  Timeout was performed, once again confirming correct patient and procedure.  Perioperative antibiotics were administered.    A small vertical periumbilical incision was created and skin using the #15 scalpel blade.  Using combination of blunt and electrocautery dissection, the incision opening was carried down to the level of the fascia.  Fascia was grasped and carefully divided between Kocher clamps using #15 scalpel blade.  There were no adhesions at the fascial entry site.  There were no injuries to the intra-abdominal viscera incurred with peritoneal entry.  The fascial opening was then extended full length of the skin incision using electrocautery.  The small sized Toi laparoscopic system loaded with a 12 mm port was inserted into the abdomen and carbon dioxide pneumoperitoneum established.  The robotic laparoscope was inserted into the abdomen and a diagnostic laparoscopy performed.  There was no evidence of diffuse intra-abdominal metastatic disease.  The visualized surfaces of the liver were unremarkable in appearance.  At this time, decision was made to proceed with insertion of additional robotic laparoscopic ports.  3 additional 8 mm laparoscopic ports were placed into the abdomen with 1 in the far left upper quadrant, 1 between this port and the periumbilical midline port,  and a third in the right lower quadrant.  A 8 mm air seal was inserted into the right upper quadrant.  At this time, the small bowel was retracted out of the pelvis.  The greater omentum was placed above the colon.  The appendix was visualized and unremarkable in appearance.  Evaluation of the pelvis demonstrated extensive tattoo marking at the level of the rectosigmoid junction.  There appeared to be associated fibrosis of the pelvis.  There was minimal laxity and mobility of the distal sigmoid colon and rectosigmoid junction.  The sigmoid colon and rectosigmoid junction appeared to be densely adhesed against the left lateral and anterior pelvic sidewalls.  Due to concern for ability to safely perform the surgery through the minimally invasive approach, decision was made to convert to an open low anterior resection.  As such, the laparoscopic ports were removed and carbon dioxide pneumoperitoneum completely released.    The periumbilical vertical incision was now extended inferiorly down to the level of the pubic symphysis using electrocautery.  The incision was also extended superiorly up into the epigastrium.  The Vicente retractor was now inserted into the abdomen.  The small bowel and right colon were packed away.  The sigmoid colon was now mobilized off the left lower quadrant abdominal wall carefully using electrocautery.  The left ureter was identified and carefully kept away from injury.  This plane of dissection was carried distally towards the rectosigmoid junction.  Additional proximal dissection was performed along the white line of Toldt to mobilize the left colon off the left lateral abdominal wall.  An incision was now created at the level of the sacral promontory over the sigmoid colon mesentery.  This plane of dissection was carried distally along the right side of the pelvis.  The plane between the mesorectum and the presacral fascia was now developed from the medial to lateral side.    The  inferior mesenteric pedicle was now identified and skeletonized.  The pedicle was divided between curved Amanda forceps using Metzenbaum scissors.  The proximal stump was controlled with a 2-0 Vicryl stick tie.  The specimen side was controlled with an 0 Vicryl tie.  The LigaSure device was now used to further divide the mesocolon up towards the junction between the sigmoid colon and descending colon.  The colon was now divided using the linear 75 mm blue load stapler.  Proximal colon was packed away into the upper abdomen.  Further attention was now directed towards completely mobilizing the colon out of the pelvis.    The plane between the mesorectum and the presacral fascia was further developed posteriorly using electrocautery.  There was significant fibrosis within the pelvis likely secondary to the patient's prior remote history of pelvic radiation for anal cancer.  The pelvic plane of dissection was carried all the way down to the level of the pelvic floor.  The right and left lateral aspects of dissection were developed using electrocautery.  The rectum was now mobilized off the vagina anteriorly using electrocautery.  A flexible sigmoidoscopy was now performed to definitively localize the lesion.  A distal point of transection approximately 5 cm distal to the lesion was selected.  The mesorectum was divided perpendicularly to the rectum using the LigaSure device at this point.  The rectum was then transected using the TX 60 mm green load stapler.  The specimen was now passed off the table for final pathology.    Assessment of reach of the colonic conduit down to the pelvis for creation of a colorectal anastomosis without tension demonstrated that the colonic conduit required additional mobilization.  The left colon was thus completely mobilized off the left retroperitoneum.  The lesser sac was then entered along the midpoint of the transverse colon by dissecting the greater omentum off the superior border of  the transverse colon.  This plane of dissection was carried distally towards the distal transverse colon all the way around the splenic flexure.  There was of note extensive adhesions within the left upper quadrant.  This portion of the case was quite challenging.  Careful dissection was performed to ensure that there were no injuries to the spleen.  Once the splenic flexure was now completely mobilized, reassessment of reach of the colonic conduit demonstrated excellent reach down to the pelvis for creation of a tension-free colorectal anastomosis.    The staple line of the descending colon was then transected using electrocautery.  The anvil of the 29 EEA stapler was inserted into the colotomy and sutured into place using 0 Prolene stitch placed in pursestring fashion.  The stapler was then introduced to the anus and advanced proximally towards the rectal stump staple line.  There was no difficulty in advancing the stapler towards the staple line.  The end of the stapler was deployed anteriorly to the transverse staple line.  The pin and anvil were .  The stapler was slowly closed.  Care was taken to ensure that there were no adjacent pelvic structures introduced into the planned colorectal anastomotic staple line.  The stapler was then fired, opened, and removed from the anus.  There were 2 complete anastomotic donuts.  The colon proximal to the newly created anastomosis was digitally compressed.  A flexible sigmoidoscopy was performed.  There was good hemostasis of the internal staple line.  Insufflation of air across the staple line also demonstrated no evidence of air leak.  The distal colon and rectum were then desufflated and the flexible sigmoidoscope withdrawn from the anus.  The pelvis was now irrigated and dried.  There was excellent hemostasis.    A 15 Bulgarian round channel drain was inserted into the abdomen via the left hemiabdomen port site.  The intra-abdominal: Of the drain was placed deep  within the pelvis adjacent to the anastomosis.  The external component of the drain was fixed to the skin using nylon stitch.  A limb of distal ileum was now selected for creation of diverting loop ileostomy given the patient's previous history of pelvic radiation.  The preoperatively marked right lower quadrant stoma site was selected for creation of ileostomy.  Circular disc of skin was excised at this area.  Electrocautery was used to carry the incision down through the soft tissues, through the anterior rectus sheath, between the rectus muscle fibers, through the posterior rectus sheath and into the abdomen.  The selected limb of distal ileum was brought out through the stoma aperture without tension.  A small window was created at the mesenteric border of the eviscerated limb of ileum.  A 12 Lithuanian red rubber catheter was inserted through the opening for use as a stoma nia.    Surgical count was performed and confirmed to be correct.  The fascia was now closed with 2 #1 looped PDS sutures beginning at each end meeting and tied in the middle.  Final surgical count was performed and correct.  The skin was reapproximated using the skin stapler.  A clean towel was placed over the incisional sites.  The diverting loop ileostomy was now matured using electrocautery and multiple 3-0 Vicryl stitches.  The red rubber catheter was trimmed to size, looped and stitched upon itself.  The abdomen was cleansed and dried.  Dry dressings were applied over the incision sites.  Stoma appliance was applied around the ileostomy.  Bulb was connected to the drain.  Signout was performed.  Patient was awakened and taken recovery area in stable condition.    Complications:  None; patient tolerated the procedure well.    Disposition: PACU - hemodynamically stable.  Condition: stable     Colon Resection  Operation performed with curative intent Yes   Tumor Location Rectum, NOS   Extent of colon and vascular resection Other Low rectum,  inferior mesenteric         Additional Details: N/A    Attending Attestation: I was present and scrubbed for the entire procedure.    Gustavo Harp  Phone Number: [phone]

## 2024-05-26 ENCOUNTER — PATIENT MESSAGE (OUTPATIENT)
Dept: SURGERY | Facility: CLINIC | Age: 69
End: 2024-05-26
Payer: COMMERCIAL

## 2024-05-26 DIAGNOSIS — N39.0 URINARY TRACT INFECTION WITHOUT HEMATURIA, SITE UNSPECIFIED: ICD-10-CM

## 2024-05-29 RX ORDER — CIPROFLOXACIN 500 MG/1
500 TABLET ORAL 2 TIMES DAILY
Qty: 10 TABLET | Refills: 0 | Status: SHIPPED | OUTPATIENT
Start: 2024-05-29 | End: 2024-06-03

## 2024-05-30 ENCOUNTER — HOME CARE VISIT (OUTPATIENT)
Dept: HOME HEALTH SERVICES | Facility: HOME HEALTH | Age: 69
End: 2024-05-30
Payer: COMMERCIAL

## 2024-05-30 VITALS
RESPIRATION RATE: 18 BRPM | TEMPERATURE: 97.3 F | OXYGEN SATURATION: 97 % | SYSTOLIC BLOOD PRESSURE: 128 MMHG | DIASTOLIC BLOOD PRESSURE: 58 MMHG | HEART RATE: 86 BPM

## 2024-05-30 PROCEDURE — G0299 HHS/HOSPICE OF RN EA 15 MIN: HCPCS

## 2024-05-30 ASSESSMENT — ENCOUNTER SYMPTOMS
PAIN LOCATION - PAIN SEVERITY: 3/10
STOOL FREQUENCY: DAILY
PAIN LOCATION - PAIN FREQUENCY: FREQUENT
PAIN SEVERITY GOAL: 0/10
LOWEST PAIN SEVERITY IN PAST 24 HOURS: 2/10
LAST BOWEL MOVEMENT: 66990
FATIGUE: 1
APPETITE LEVEL: FAIR
PERSON REPORTING PAIN: PATIENT
HIGHEST PAIN SEVERITY IN PAST 24 HOURS: 6/10
MUSCLE WEAKNESS: 1
ABDOMINAL PAIN: 1
PAIN: 1
SUBJECTIVE PAIN PROGRESSION: GRADUALLY IMPROVING
FATIGUES EASILY: 1
PAIN LOCATION: ABDOMEN
CHANGE IN APPETITE: UNCHANGED

## 2024-06-03 PROCEDURE — G0180 MD CERTIFICATION HHA PATIENT: HCPCS | Performed by: STUDENT IN AN ORGANIZED HEALTH CARE EDUCATION/TRAINING PROGRAM

## 2024-06-04 ASSESSMENT — ENCOUNTER SYMPTOMS
RECTAL PAIN: 0
VOMITING: 0
PALPITATIONS: 0
FEVER: 0
HEMATURIA: 0
DIZZINESS: 0
FATIGUE: 0
UNEXPECTED WEIGHT CHANGE: 0
HEMATOLOGIC/LYMPHATIC NEGATIVE: 1
ANAL BLEEDING: 0
SHORTNESS OF BREATH: 0
COUGH: 0
ACTIVITY CHANGE: 0
SEIZURES: 0
DIARRHEA: 0
CHEST TIGHTNESS: 0
COLOR CHANGE: 0
DIFFICULTY URINATING: 0
CHILLS: 0
ABDOMINAL DISTENTION: 0
DIAPHORESIS: 0
LIGHT-HEADEDNESS: 0
CONSTIPATION: 0
BLOOD IN STOOL: 0
APPETITE CHANGE: 0
FREQUENCY: 0

## 2024-06-04 NOTE — PROGRESS NOTES
FRANCINE Ibanez is a 68 y.o. female with a history of anal cancer 20 years ago for which she received chemo and radiation. She was scheduled for sigmoid resection with Dr. Olivares for a rectosigmoid mass with biopsy proven adenocarcinoma in [place]. MMR intact. (No records of this available in Epic). On 10/27 she underwent lap THERON with ureteral stent insertion. The procedure was stopped due to bulky disease.    Imaging was performed since last visit. She was seen by oncology and had a port placed on 11/21/23. Presented on MD 11/29/23. She has started chemotherapy; follows with Dr. Mayes.  She completed chemotherapy last month. She was last seen 4/17 where a flex sig was conducted with biopsy.     She is s/p Open LAR, mobilization splenic flexure, 29 EEA CRA, flex sig, and DLI on 5/17/24. Path demonstrating invasive moderately differentiated adenocarcinoma of rectum, T3N1b. Metastatic adenocarcinoma identified in 2/40 lymph nodes. She had some bms per rectum during hospital admission, but overall unremarkable stay. She was discharged home with  home care on 5/23/24. She presents today for follow up.      She states she has some drainage from rectum. She is still having dribbling with urination. Incision staples removed in office today. She has some irritation on skin around stoma, which causes her some pain. She is eating and drinking normally. Appetite normal.     CEA 10/2023: 20.7; 2/19/24: 5.4; 3/28/24: 2.3    CT c/a/p 4/5/24: Left lower lobe stable 5 mm nodule as well as a right lower lobe stable 2 mm nodule, more likely benign. No significant new or enlarging pulmonary nodule.  - Nonspecific mildly prominent pretracheal lymph node measuring 1 cm in short axis unchanged. No new thoracic lymphadenopathy.   - Some irregular rectal wall thickening which may be related to patient's known neoplasm. Please see report of recent MRI rectum 04/02/2024 for more detail.  - Distal colonic diverticulosis without  focal acute diverticulitis.  - Nonspecific mild wall prominence of the mid-distal sigmoid colon may be partially exaggerated by underdistention.  - No bowel obstruction.   - Small nonspecific retroperitoneal lymph nodes which are not individually pathologically enlarged.  - Nonspecific mild fat stranding of the retroperitoneum. No retroperitoneal fluid collection.  - Subcentimeter low-attenuation lesion in the dome of the left hepatic lobe too small to characterize but unchanged from prior, more likely benign. No new focal hepatic lesion.    MRI rectum 4/2/24: Favorable treatment response. The previous upper-mid rectal mass has significantly regressed, however there is still small focus of restricted diffusion in the mass posteriorly which could reflect residual tumor. Previous involvement of the mesorectal fascia and peritoneal reflection is no longer visualized, although there appear to be posttreatment related fibrosis at this site. The previously enlarged presacral lymph nodes have regressed in size, the dominant nodes from 7 mm short axis to 3mm. No current lymphadenopathy identified.    Cardiac stress test 10/20/23: Normal Lexiscan Myoview cardiac perfusion stress test. No evidence of ischemia or myocardial infarction by perfusion imaging. Normal left ventricular systolic function, ejection fraction 68%. None invasive risk stratification is low risk.    Colonoscopy 9/8/23 (in [place]): Prep of the colon was poor. Hemorrhoids found on perianal exam. Stool in the rectum and in the recto-sigmoid colon. Rule out malignancy, completely obstructing tumor in the recto-sigmoid colon. Biopsied. Tattooed. Path demonstrating invasive adenocarcinoma. MMR intact.     Current smoker- 1/4 ppd/No ETOH/No Illicit drug use  No family history of CRC or IBD  PMH: MI, coronary stents about 10 years ago, HTN, Anal cancer, A.fib  PSH: open cholecystectomy, open appendectomy, open hysterectomy, vascular surgery on right groin for  blockage  Employment: Retired    Past Medical History:   Diagnosis Date    Cholelithiasis     Coronary artery disease     High cholesterol     History of heart attack 2015    History of rectal or anal cancer     20 yrs ago    Hypertension     Presence of dental prosthetic device (complete) (partial)     upper and lower partail    Psoriasis     Rectal cancer (Multi)     Wears glasses        Past Surgical History:   Procedure Laterality Date    ANGIOPLASTY      ANUS SURGERY      APPENDECTOMY      CHOLECYSTECTOMY      COLONOSCOPY      CORONARY ANGIOPLASTY WITH STENT PLACEMENT      FLEXIBLE SIGMOIDOSCOPY      HYSTERECTOMY      LAPAROSCOPY DIAGNOSTIC / BIOPSY / ASPIRATION / LYSIS      10/27/2023 noted bulk disease    PELVIC LAPAROSCOPY       Current Outpatient Medications   Medication Sig Dispense Refill    acetaminophen (Tylenol) 325 mg tablet Take 2 tablets (650 mg) by mouth every 6 hours if needed for mild pain (1 - 3).      aspirin 81 mg chewable tablet Chew 1 tablet (81 mg) once daily. 90 tablet 3    cyanocobalamin, vitamin B-12, (VITAMIN B-12 ORAL) Take 1,000 mcg by mouth once daily at bedtime.      lisinopril 10 mg tablet Take 1 tablet (10 mg) by mouth 2 times a day. 180 tablet 3    metoprolol succinate XL (Toprol-XL) 25 mg 24 hr tablet Take 1 tablet (25 mg) by mouth once daily. Do not crush or chew. 90 tablet 3    omeprazole (PriLOSEC) 20 mg DR capsule Take 1 capsule (20 mg) by mouth once daily. Do not crush or chew.      prochlorperazine (Compazine) 5 mg tablet Take 1 tablet (5 mg) by mouth every 6 hours if needed for nausea or vomiting.      rosuvastatin (Crestor) 5 mg tablet Take 1 tablet (5 mg) by mouth once daily. (Patient taking differently: Take 1 tablet (5 mg) by mouth once daily at bedtime.) 90 tablet 3     No current facility-administered medications for this visit.      No Known Allergies    Review of Systems   Constitutional:  Negative for activity change, appetite change, chills, diaphoresis,  fatigue, fever and unexpected weight change.   Respiratory:  Negative for cough, chest tightness and shortness of breath.    Cardiovascular:  Negative for chest pain, palpitations and leg swelling.   Gastrointestinal:  Positive for abdominal pain. Negative for abdominal distention, anal bleeding, blood in stool, constipation, diarrhea, nausea, rectal pain and vomiting.        Peristomal pain   Genitourinary:  Positive for dysuria. Negative for difficulty urinating, frequency, hematuria and pelvic pain.        Weak urinary stream   Skin:  Negative for color change.   Neurological:  Negative for dizziness, seizures and light-headedness.   Hematological: Negative.    All other systems reviewed and are negative.      Physical Exam  Constitutional:       General: He is not in acute distress.     Appearance: Normal appearance. He is not ill-appearing.   HENT:      Head: Normocephalic.      Mouth/Throat:      Mouth: Mucous membranes are moist.   Eyes:      Extraocular Movements: Extraocular movements intact.   Pulmonary:      Effort: Pulmonary effort is normal. No respiratory distress.   Abdominal:      General: There is no distension.      Palpations: Abdomen is soft. There is no mass.      Tenderness: There is no abdominal tenderness. There is no guarding or rebound.      Hernia: No hernia is present.      Comments: Well healed small periumbilical incisional scar. RLQ ostomy with non bloody green liquid stool output. RLQ, LLQ, and midline incision staples removed.   Musculoskeletal:         General: No swelling or deformity. Normal range of motion.      Cervical back: Normal range of motion and neck supple. No rigidity.      Right lower leg: No edema.      Left lower leg: No edema.   Skin:     General: Skin is warm and dry.      Capillary Refill: Capillary refill takes less than 2 seconds.      Coloration: Skin is not jaundiced or pale.   Neurological:      General: No focal deficit present.      Mental Status: He is  alert and oriented to person, place, and time. Mental status is at baseline.   Psychiatric:         Mood and Affect: Mood normal.         Behavior: Behavior normal.         Thought Content: Thought content normal.         Judgment: Judgment normal.     No neck/cervical/inguinal LAD      Assessment and Plan:   #Upper rectal adenocarcinoma, MMR intact per review of OSH pathology report, S/P diagnostic laparoscopy with some colonic mobilization and THERON at [facility] 10/27/2023 with aborted attempt at oncologic resection followed by chemotherapy (FOLFOX), final 8th cycle 3/4/2024, S/P robotic converted to open LAR, mobilization splenic flexure, CRA, flexible sigmoidoscopy, DLI 5/17/2024  #Attention ileostomy  #Remote hx anal Ca S/P primary resection and CRT    -  Expected post-operative course  -  Staples removed today in office  -  Pathology reviewed with patient  -  Submit for MDTB discussion, ? Adjuvant therapy  -  Patient will call to schedule appointment with Dr. Mayes, oncologist  -  OK to start liberalizing diet  -  No strenuous activity or heavy lifting until 6 weeks post-op  -  Follow-up with me in 4 weeks  -  Referral to primary care to establish care    Gustavo Harp MD   6/11/2024  1:28 PM

## 2024-06-05 LAB
LAB AP BLOCK FOR ADDITIONAL STUDIES: NORMAL
LABORATORY COMMENT REPORT: NORMAL
PATH REPORT.FINAL DX SPEC: NORMAL
PATH REPORT.GROSS SPEC: NORMAL
PATH REPORT.RELEVANT HX SPEC: NORMAL
PATH REPORT.TOTAL CANCER: NORMAL
PATHOLOGY SYNOPTIC REPORT: NORMAL

## 2024-06-07 ENCOUNTER — HOME CARE VISIT (OUTPATIENT)
Dept: HOME HEALTH SERVICES | Facility: HOME HEALTH | Age: 69
End: 2024-06-07
Payer: COMMERCIAL

## 2024-06-07 VITALS
SYSTOLIC BLOOD PRESSURE: 100 MMHG | HEART RATE: 72 BPM | DIASTOLIC BLOOD PRESSURE: 60 MMHG | RESPIRATION RATE: 18 BRPM | TEMPERATURE: 97.7 F | OXYGEN SATURATION: 97 %

## 2024-06-07 PROCEDURE — G0299 HHS/HOSPICE OF RN EA 15 MIN: HCPCS

## 2024-06-07 ASSESSMENT — PAIN SCALES - PAIN ASSESSMENT IN ADVANCED DEMENTIA (PAINAD): BREATHING: 0

## 2024-06-07 ASSESSMENT — ENCOUNTER SYMPTOMS
HIGHEST PAIN SEVERITY IN PAST 24 HOURS: 7/10
PAIN SEVERITY GOAL: 0/10
MUSCLE WEAKNESS: 1
APPETITE LEVEL: FAIR
LOWEST PAIN SEVERITY IN PAST 24 HOURS: 5/10

## 2024-06-11 ENCOUNTER — OFFICE VISIT (OUTPATIENT)
Dept: SURGERY | Facility: CLINIC | Age: 69
End: 2024-06-11
Payer: COMMERCIAL

## 2024-06-11 VITALS
BODY MASS INDEX: 27 KG/M2 | HEART RATE: 87 BPM | WEIGHT: 168 LBS | DIASTOLIC BLOOD PRESSURE: 78 MMHG | HEIGHT: 66 IN | SYSTOLIC BLOOD PRESSURE: 154 MMHG

## 2024-06-11 DIAGNOSIS — Z43.2 ATTENTION TO ILEOSTOMY (MULTI): ICD-10-CM

## 2024-06-11 DIAGNOSIS — C20 RECTAL CANCER (MULTI): ICD-10-CM

## 2024-06-11 DIAGNOSIS — Z85.048 HISTORY OF ANAL CANCER: Primary | ICD-10-CM

## 2024-06-11 PROCEDURE — 3008F BODY MASS INDEX DOCD: CPT | Performed by: STUDENT IN AN ORGANIZED HEALTH CARE EDUCATION/TRAINING PROGRAM

## 2024-06-11 PROCEDURE — 1159F MED LIST DOCD IN RCRD: CPT | Performed by: STUDENT IN AN ORGANIZED HEALTH CARE EDUCATION/TRAINING PROGRAM

## 2024-06-11 PROCEDURE — 1111F DSCHRG MED/CURRENT MED MERGE: CPT | Performed by: STUDENT IN AN ORGANIZED HEALTH CARE EDUCATION/TRAINING PROGRAM

## 2024-06-11 PROCEDURE — 99024 POSTOP FOLLOW-UP VISIT: CPT | Performed by: STUDENT IN AN ORGANIZED HEALTH CARE EDUCATION/TRAINING PROGRAM

## 2024-06-11 ASSESSMENT — ENCOUNTER SYMPTOMS
NAUSEA: 0
ABDOMINAL PAIN: 1
DYSURIA: 1

## 2024-06-12 ENCOUNTER — HOME CARE VISIT (OUTPATIENT)
Dept: HOME HEALTH SERVICES | Facility: HOME HEALTH | Age: 69
End: 2024-06-12
Payer: COMMERCIAL

## 2024-06-12 VITALS
RESPIRATION RATE: 18 BRPM | SYSTOLIC BLOOD PRESSURE: 108 MMHG | HEART RATE: 66 BPM | OXYGEN SATURATION: 97 % | TEMPERATURE: 97.9 F | DIASTOLIC BLOOD PRESSURE: 54 MMHG

## 2024-06-12 PROCEDURE — G0299 HHS/HOSPICE OF RN EA 15 MIN: HCPCS

## 2024-06-12 ASSESSMENT — ENCOUNTER SYMPTOMS
LOWEST PAIN SEVERITY IN PAST 24 HOURS: 0/10
PAIN LOCATION - PAIN SEVERITY: 2/10
PAIN SEVERITY GOAL: 0/10
ABDOMINAL PAIN: 1
APPETITE LEVEL: FAIR
PAIN LOCATION: ABDOMEN
PERSON REPORTING PAIN: PATIENT
FATIGUES EASILY: 1
PAIN: 1
STOOL FREQUENCY: DAILY
FATIGUE: 1
HIGHEST PAIN SEVERITY IN PAST 24 HOURS: 3/10
PAIN LOCATION - PAIN FREQUENCY: INTERMITTENT
SUBJECTIVE PAIN PROGRESSION: GRADUALLY IMPROVING
CHANGE IN APPETITE: INCREASED
LAST BOWEL MOVEMENT: 67003
PAIN LOCATION - PAIN QUALITY: PRESSURE
MUSCLE WEAKNESS: 1

## 2024-06-18 NOTE — TUMOR BOARD NOTE
MULTIDISCIPLINARY RECTAL TUMOR BOARD CONFERENCE NOTE  Janis Ibanez was presented at Rectal Tumor Board Conference  Conference date: 6/19/2024  Presenting Provider(s): Dr. Gustavo Harp  Present at Conference: Medical Oncology, Radiation Oncology, Surgical Oncology, Radiology, and Pathology Representatives  Conference Review Type: Pathology Review     Impression:  Janis Ibanez is a 68 y.o. female patient who presents with a history of anal cancer dx 20 years ago treated with chemotherapy and radiation. New diagnosis of upper to mid-rectal cancer, completed C8 FOLFOX on 3/4/24, now s/p open LAR, mobilization splenic flexure, 29 EEA CRA, flex sig, and DLI on 5/17/24.     (05/17/24) Sigmoid Colon and Rectum Resection: Invasive moderately differentiated adenocarcinoma of rectum. Metastatic adenocarcinoma identified in 2/40 submitted regional lymph nodes.    Tumor Board Stage: ypT3 pN1b    Mismatch Repair Status: Proficient    National Guidelines discussed: Yes  Recommendations: Surveillance  Recommend that patient connect back with medical oncologist for surveillance and to confirm appropriate duration of preoperative chemotherapy was administered.     Referral Recommendations:  Medical Oncology    Cancer Staging:  Cancer Staging   No matching staging information was found for the patient.       Disclaimer  [facility] tumor board recommendations represent the consensus opinion of physicians present at a weekly patient care conference. The treating SCC physician is not always present, and many of the physicians formulating the recommendation have not personally seen or examined the patient under discussion. It is understood that the treating SCC physician considers the expertise of the Tumor Board Recommendation in formulating his/her plan for the patient. However, in many situations, based on individualized patient considerations, a different plan is determined by the treating physician to be the optimal medical  management.    Scribe Attestation  By signing my name below, I, Charles Manuel   attest that this documentation has been prepared under the direction and in the presence of RECTAL TUMOR BOARD.

## 2024-06-19 ENCOUNTER — APPOINTMENT (OUTPATIENT)
Dept: HEMATOLOGY/ONCOLOGY | Facility: HOSPITAL | Age: 69
End: 2024-06-19
Payer: COMMERCIAL

## 2024-06-19 ENCOUNTER — HOME CARE VISIT (OUTPATIENT)
Dept: HOME HEALTH SERVICES | Facility: HOME HEALTH | Age: 69
End: 2024-06-19
Payer: COMMERCIAL

## 2024-06-19 VITALS
RESPIRATION RATE: 18 BRPM | OXYGEN SATURATION: 97 % | DIASTOLIC BLOOD PRESSURE: 58 MMHG | TEMPERATURE: 98.1 F | SYSTOLIC BLOOD PRESSURE: 110 MMHG | HEART RATE: 71 BPM

## 2024-06-19 PROCEDURE — G0299 HHS/HOSPICE OF RN EA 15 MIN: HCPCS

## 2024-06-19 ASSESSMENT — ENCOUNTER SYMPTOMS
DENIES PAIN: 1
HIGHEST PAIN SEVERITY IN PAST 24 HOURS: 0/10
PERSON REPORTING PAIN: PATIENT

## 2024-06-19 ASSESSMENT — ACTIVITIES OF DAILY LIVING (ADL)
HOME_HEALTH_OASIS: 00
OASIS_M1830: 00

## 2024-07-03 ASSESSMENT — ENCOUNTER SYMPTOMS
DIFFICULTY URINATING: 0
BLOOD IN STOOL: 0
ANAL BLEEDING: 0
COUGH: 0
DIZZINESS: 0
ABDOMINAL DISTENTION: 0
CONSTIPATION: 0
UNEXPECTED WEIGHT CHANGE: 0
HEMATOLOGIC/LYMPHATIC NEGATIVE: 1
SHORTNESS OF BREATH: 0
RECTAL PAIN: 0
VOMITING: 0
NAUSEA: 0
HEMATURIA: 0
DIAPHORESIS: 0
PALPITATIONS: 0
SEIZURES: 0
CHEST TIGHTNESS: 0
FEVER: 0
CHILLS: 0
APPETITE CHANGE: 0
LIGHT-HEADEDNESS: 0
ACTIVITY CHANGE: 0
COLOR CHANGE: 0
FATIGUE: 0
DIARRHEA: 0
FREQUENCY: 0

## 2024-07-03 NOTE — PROGRESS NOTES
FRANCINE Ibanez is a 68 y.o. female with a history of anal cancer 20 years ago for which she received chemo and radiation. She was scheduled for sigmoid resection with Dr. Olivares for a rectosigmoid mass with biopsy proven adenocarcinoma in [place]. MMR intact. (No records of this available in Epic). On 10/27 she underwent lap THERON with ureteral stent insertion. The procedure was stopped due to bulky disease.    She was seen by oncology and had a port placed on 11/21/23. Presented on MDTB 11/29/23. She started chemotherapy; follows with Dr. Mayes.  She was seen 4/17 where a flex sig was conducted with biopsy.  She then underwent an open LAR, mobilization splenic flexure, 29 EEA CRA, flex sig, and DLI on 5/17/24. Path demonstrating invasive moderately differentiated adenocarcinoma of rectum, T3N1b. Metastatic adenocarcinoma identified in 2/40 lymph nodes. She had some bms per rectum during hospital admission, but overall unremarkable stay. She was discharged home with  home care on 5/23/24.  She was last seen in office on 6/11/24 and she was presented to  and the recommendations were for patient to follow-up with Dr. Mayes. She presents today for 1 month post-op follow up.     She reports she is feeling much better overall. She has some pain around the peristomal skin.  She is eating/drinking without difficulty.  Normal appetite. No issues with ostomy appliance. She changes the ostomy appliance every 4-5 days. Is wearing a stoma belt and this has significantly reduced leaking from around the stoma appliance.  She has mucus drainage from rectum, but no blood. She denies hematochezia or melena. She denies fevers, chills, or sweats.     CEA 10/2023: 20.7; 2/19/24: 5.4; 3/28/24: 2.3    CT c/a/p 4/4/24: Left lower lobe stable 5 mm nodule as well as a right lower lobe stable 2 mm nodule, more likely benign. No significant new or enlarging pulmonary nodule.  - Nonspecific mildly prominent pretracheal lymph  node measuring 1 cm in short axis unchanged. No new thoracic lymphadenopathy.   - Some irregular rectal wall thickening which may be related to patient's known neoplasm. Please see report of recent MRI rectum 04/02/2024 for more detail.  - Distal colonic diverticulosis without focal acute diverticulitis.  - Nonspecific mild wall prominence of the mid-distal sigmoid colon may be partially exaggerated by underdistention.  - No bowel obstruction.   - Small nonspecific retroperitoneal lymph nodes which are not individually pathologically enlarged.  - Nonspecific mild fat stranding of the retroperitoneum. No retroperitoneal fluid collection.  - Subcentimeter low-attenuation lesion in the dome of the left hepatic lobe too small to characterize but unchanged from prior, more likely benign. No new focal hepatic lesion.    MRI rectum 4/2/24: Favorable treatment response. The previous upper-mid rectal mass has significantly regressed, however there is still small focus of restricted diffusion in the mass posteriorly which could reflect residual tumor. Previous involvement of the mesorectal fascia and peritoneal reflection is no longer visualized, although there appear to be posttreatment related fibrosis at this site. The previously enlarged presacral lymph nodes have regressed in size, the dominant nodes from 7 mm short axis to 3mm. No current lymphadenopathy identified.    Cardiac stress test 10/20/23: Normal Lexiscan Myoview cardiac perfusion stress test. No evidence of ischemia or myocardial infarction by perfusion imaging. Normal left ventricular systolic function, ejection fraction 68%. None invasive risk stratification is low risk.    Colonoscopy 9/8/23 (in [place]): Prep of the colon was poor. Hemorrhoids found on perianal exam. Stool in the rectum and in the recto-sigmoid colon. Rule out malignancy, completely obstructing tumor in the recto-sigmoid colon. Biopsied. Tattooed. Path demonstrating invasive  adenocarcinoma. MMR intact.     Current smoker- 1/4 ppd/No ETOH/No Illicit drug use  No family history of CRC or IBD  PMH: MI, coronary stents about 10 years ago, HTN, Anal cancer, A.fib  PSH: open cholecystectomy, open appendectomy, open hysterectomy, vascular surgery on right groin for blockage  Employment: Retired    Past Medical History:   Diagnosis Date    Cholelithiasis     Coronary artery disease     High cholesterol     History of heart attack 2015    History of rectal or anal cancer     20 yrs ago    Hypertension     Presence of dental prosthetic device (complete) (partial)     upper and lower partail    Psoriasis     Rectal cancer (Multi)     Wears glasses        Past Surgical History:   Procedure Laterality Date    ANGIOPLASTY      ANUS SURGERY      APPENDECTOMY      CHOLECYSTECTOMY      COLONOSCOPY      CORONARY ANGIOPLASTY WITH STENT PLACEMENT      FLEXIBLE SIGMOIDOSCOPY      HYSTERECTOMY      LAPAROSCOPY DIAGNOSTIC / BIOPSY / ASPIRATION / LYSIS      10/27/2023 noted bulk disease    PELVIC LAPAROSCOPY       Current Outpatient Medications   Medication Sig Dispense Refill    acetaminophen (Tylenol) 325 mg tablet Take 2 tablets (650 mg) by mouth every 6 hours if needed for mild pain (1 - 3).      aspirin 81 mg chewable tablet Chew 1 tablet (81 mg) once daily. 90 tablet 3    cyanocobalamin, vitamin B-12, (VITAMIN B-12 ORAL) Take 1,000 mcg by mouth once daily at bedtime.      lisinopril 10 mg tablet Take 1 tablet (10 mg) by mouth 2 times a day. 180 tablet 3    metoprolol succinate XL (Toprol-XL) 25 mg 24 hr tablet Take 1 tablet (25 mg) by mouth once daily. Do not crush or chew. 90 tablet 3    omeprazole (PriLOSEC) 20 mg DR capsule Take 1 capsule (20 mg) by mouth once daily. Do not crush or chew.      prochlorperazine (Compazine) 5 mg tablet Take 1 tablet (5 mg) by mouth every 6 hours if needed for nausea or vomiting.      rosuvastatin (Crestor) 5 mg tablet Take 1 tablet (5 mg) by mouth once daily. (Patient  taking differently: Take 1 tablet (5 mg) by mouth once daily at bedtime.) 90 tablet 3     No current facility-administered medications for this visit.      No Known Allergies    Review of Systems   Constitutional:  Negative for activity change, appetite change, chills, diaphoresis, fatigue, fever and unexpected weight change.   Respiratory:  Negative for cough, chest tightness and shortness of breath.    Cardiovascular:  Negative for chest pain, palpitations and leg swelling.   Gastrointestinal:  Positive for abdominal pain. Negative for abdominal distention, anal bleeding, blood in stool, constipation, diarrhea, nausea, rectal pain and vomiting.        Peristomal pain   Genitourinary:  Negative for difficulty urinating, dysuria, frequency, hematuria and pelvic pain.   Skin:  Negative for color change.   Neurological:  Negative for dizziness, seizures and light-headedness.   Hematological: Negative.    All other systems reviewed and are negative.      Physical Exam  Constitutional:       General: He is not in acute distress.     Appearance: Normal appearance. He is not ill-appearing.   HENT:      Head: Normocephalic.      Mouth/Throat:      Mouth: Mucous membranes are moist.   Eyes:      Extraocular Movements: Extraocular movements intact.   Pulmonary:      Effort: Pulmonary effort is normal. No respiratory distress.   Abdominal:      General: There is no distension.      Palpations: Abdomen is soft. There is no mass.      Tenderness: There is no abdominal tenderness. There is no guarding or rebound.      Hernia: No hernia is present.      Comments: Well healed small periumbilical incisional scar. RLQ ostomy with non bloody green liquid stool output. Healing RLQ, LLQ, and midline incision. No signs of infection.    Musculoskeletal:         General: No swelling or deformity. Normal range of motion.      Cervical back: Normal range of motion and neck supple. No rigidity.      Right lower leg: No edema.      Left lower  leg: No edema.   Skin:     General: Skin is warm and dry.      Capillary Refill: Capillary refill takes less than 2 seconds.      Coloration: Skin is not jaundiced or pale.   Neurological:      General: No focal deficit present.      Mental Status: He is alert and oriented to person, place, and time. Mental status is at baseline.   Psychiatric:         Mood and Affect: Mood normal.         Behavior: Behavior normal.         Thought Content: Thought content normal.         Judgment: Judgment normal.           Assessment and Plan:   #Upper rectal adenocarcinoma, MMR intact per review of OSH pathology report, S/P diagnostic laparoscopy with some colonic mobilization and THERON at [facility] 10/27/2023 with aborted attempt at oncologic resection followed by chemotherapy (FOLFOX), final 8th cycle 3/4/2024, S/P robotic converted to open LAR, mobilization splenic flexure, CRA, flexible sigmoidoscopy, DLI 5/17/2024  #Attention ileostomy  #Remote hx anal Ca S/P primary resection and CRT  -  Recovering very nicely  -  To follow-up with Dr. Mayes this week  -  If no adjuvant therapy, plan to proceed with GGE followed by colonoscopy 8/22/2024    Gustavo Harp MD   7/9/2024  1:21 PM

## 2024-07-09 ENCOUNTER — APPOINTMENT (OUTPATIENT)
Dept: SURGERY | Facility: CLINIC | Age: 69
End: 2024-07-09
Payer: COMMERCIAL

## 2024-07-09 VITALS
HEART RATE: 85 BPM | SYSTOLIC BLOOD PRESSURE: 132 MMHG | BODY MASS INDEX: 27 KG/M2 | WEIGHT: 168 LBS | DIASTOLIC BLOOD PRESSURE: 74 MMHG | HEIGHT: 66 IN

## 2024-07-09 DIAGNOSIS — C20 RECTAL CANCER (MULTI): ICD-10-CM

## 2024-07-09 DIAGNOSIS — Z43.2 ATTENTION TO ILEOSTOMY (MULTI): Primary | ICD-10-CM

## 2024-07-09 DIAGNOSIS — Z85.048 HISTORY OF ANAL CANCER: ICD-10-CM

## 2024-07-09 ASSESSMENT — ENCOUNTER SYMPTOMS
ABDOMINAL PAIN: 1
DYSURIA: 0

## 2024-07-26 ENCOUNTER — APPOINTMENT (OUTPATIENT)
Dept: RADIOLOGY | Facility: HOSPITAL | Age: 69
End: 2024-07-26
Payer: COMMERCIAL

## 2024-08-02 ENCOUNTER — APPOINTMENT (OUTPATIENT)
Dept: RADIOLOGY | Facility: HOSPITAL | Age: 69
End: 2024-08-02
Payer: COMMERCIAL

## 2024-08-05 ENCOUNTER — HOSPITAL ENCOUNTER (OUTPATIENT)
Dept: RADIATION ONCOLOGY | Age: 69
Discharge: HOME | End: 2024-08-05
Payer: COMMERCIAL

## 2024-08-05 VITALS
SYSTOLIC BLOOD PRESSURE: 141 MMHG | TEMPERATURE: 97.5 F | RESPIRATION RATE: 18 BRPM | BODY MASS INDEX: 26.08 KG/M2 | HEART RATE: 71 BPM | DIASTOLIC BLOOD PRESSURE: 75 MMHG | WEIGHT: 161.6 LBS | OXYGEN SATURATION: 99 %

## 2024-08-05 DIAGNOSIS — C20 RECTAL CANCER (HCC): Primary | ICD-10-CM

## 2024-08-05 PROCEDURE — 99212 OFFICE O/P EST SF 10 MIN: CPT | Performed by: RADIOLOGY

## 2024-08-05 RX ORDER — OMEPRAZOLE 20 MG/1
20 CAPSULE, DELAYED RELEASE ORAL DAILY
COMMUNITY

## 2024-08-05 NOTE — PROGRESS NOTES
NURSING ASSESSMENT     Date: 8/5/2024        Patient Name: America Harmon     YOB: 1955      Age:  68 y.o.      MRN: [id number]       Chaperone [x] Yes   [] No  Son, Jacob    Advance Directives:   Do you currently have completed advance directives (living will)? [] Yes   [] No         *If yes, please bring us a copy for your records. Have documents, but not notarized  *If no, would you like info or assistance in completing advance directives (living will)?   [] Yes   [x] No    Pain Score:   Pain Score (1-10): Denies   Pain Location: NA Pain Duration: NA   Pain Management/Control: NA      Is pain affecting your ability to take care of yourself or move throughout your home?                        [] Yes   [x] No    General: No Problems- good appetite  Patient has gained weight [] Yes   [x] No  Patient has lost weight [x] Yes   [] No  How much weight in pounds and over what length of time: Down 27.6 lbs since last visit on12/13/23. Some weight loss from surgery and intentional loss.    Eyes (Ophthalmic): wears contacts and or glasses     Skin (Dermatological): Psoriasis to elbows     ENT: No Problems     Respiratory: Continues to smoke      Cardiovascular: h/o MI with stent. Left upper chest infusaport.      Device   [] Yes   [x] No   Copy of Card Obtained [] Yes   [x] No    Gastrointestinal: s/p LAR on 5/17/24, has an ileostomy with loose stool in the morning after coffee, then becomes soft later in the day. Avoids corn,nuts and salad. Mild skin irritation around ileostomy.    Genito-Urinary: No Problems       Breast: No Problems     Musculoskeletal: No Problems    Neurological: No Problems      Hematological and Lymphatic: No Problems     Endocrine: No Problems     Gyn History:   No issues  Last Mammogram: 2023    A 10-point review of systems  has been conducted and pertinent positives have been   recorded. All other review of systems are negative    Was the patient admitted during the course of

## 2024-08-23 ENCOUNTER — HOSPITAL ENCOUNTER (OUTPATIENT)
Dept: RADIOLOGY | Facility: CLINIC | Age: 69
Discharge: HOME | End: 2024-08-23
Payer: COMMERCIAL

## 2024-08-23 DIAGNOSIS — C20 MALIGNANT NEOPLASM OF RECTUM (MULTI): ICD-10-CM

## 2024-08-23 PROCEDURE — A9552 F18 FDG: HCPCS

## 2024-08-23 PROCEDURE — 3430000001 HC RX 343 DIAGNOSTIC RADIOPHARMACEUTICALS

## 2024-08-23 PROCEDURE — 78815 PET IMAGE W/CT SKULL-THIGH: CPT | Mod: PI

## 2024-08-23 RX ORDER — FLUDEOXYGLUCOSE F 18 200 MCI/ML
12.9 INJECTION, SOLUTION INTRAVENOUS
Status: COMPLETED | OUTPATIENT
Start: 2024-08-23 | End: 2024-08-23

## 2024-09-05 ASSESSMENT — ENCOUNTER SYMPTOMS
FREQUENCY: 0
DIAPHORESIS: 0
VOMITING: 0
DIARRHEA: 0
ACTIVITY CHANGE: 0
COUGH: 0
BLOOD IN STOOL: 0
FATIGUE: 0
HEMATURIA: 0
UNEXPECTED WEIGHT CHANGE: 0
SEIZURES: 0
COLOR CHANGE: 0
CONSTIPATION: 0
ABDOMINAL DISTENTION: 0
ANAL BLEEDING: 0
DYSURIA: 0
FEVER: 0
CHEST TIGHTNESS: 0
RECTAL PAIN: 0
HEMATOLOGIC/LYMPHATIC NEGATIVE: 1
DIZZINESS: 0
ABDOMINAL PAIN: 1
SHORTNESS OF BREATH: 0
PALPITATIONS: 0
APPETITE CHANGE: 0
LIGHT-HEADEDNESS: 0
NAUSEA: 0
CHILLS: 0
DIFFICULTY URINATING: 0

## 2024-09-05 NOTE — PROGRESS NOTES
FRANCINE Ibanez is a 68 y.o. female with a history of anal cancer 20 years ago for which she received chemo and radiation. She was scheduled for sigmoid resection with Dr. Olivares for a rectosigmoid mass with biopsy proven adenocarcinoma in [place]. MMR intact. (No records of this available in Epic). On 10/27 she underwent lap THERON with ureteral stent insertion. The procedure was stopped due to bulky disease.    She was seen by oncology and had a port placed on 11/21/23. Presented on MDTB 11/29/23. She started chemotherapy; follows with Dr. Mayes.  She was seen 4/17 where a flex sig was conducted with biopsy.  She then underwent an open LAR, mobilization splenic flexure, 29 EEA CRA, flex sig, and DLI on 5/17/24. Path demonstrating invasive moderately differentiated adenocarcinoma of rectum, T3N1b. Metastatic adenocarcinoma identified in 2/40 lymph nodes. She had some bms per rectum during hospital admission, but overall unremarkable stay. She was discharged home with  home care on 5/23/24.  She was last seen in office on 6/11/24 and she was presented to  and the recommendations were for patient to follow-up with Dr. Mayes. She recently called the office to inform that she does not need chemotherapy as she is cancer free, after PET CT Showed no evidence of reoccurrence on 8/23/24. She presents today to discuss reversal procedure.     She reports she is here to discuss reversal surgery. She denies abdominal pain, but does endorse soreness around peristomal skin. Normal appetite. She denies nausea or vomiting. She denies issues with ostomy appliance. She changes the ostomy appliance weekly. She describes mucus drainage from rectum, denies blood. She denies dysuria or hematuria. She denies unexplained weight loss. No new fatigue or weakness.  She denies any new medical problems. She is currently taking ASA daily. Reports having ctDNA test recently which was negative.    CEA 10/2023: 20.7; 2/19/24: 5.4;  3/28/24: 2.3    GGE 9/23/24: Scheduled.     PET CT 8/23/24: 1. Postsurgical changes of low anterior resection of previous colorectal adenocarcinoma, an intense FDG avid presacral soft tissue thickening at the postsurgical site ,compatible with recurrent disease.  2. Focal FDG uptake at the anterior abdominal wall excision site, likely represent postsurgical inflammation.  3. No other concerning FDG avid disease identified.    CT c/a/p 4/4/24: Left lower lobe stable 5 mm nodule as well as a right lower lobe stable 2 mm nodule, more likely benign. No significant new or enlarging pulmonary nodule.  - Nonspecific mildly prominent pretracheal lymph node measuring 1 cm in short axis unchanged. No new thoracic lymphadenopathy.   - Some irregular rectal wall thickening which may be related to patient's known neoplasm. Please see report of recent MRI rectum 04/02/2024 for more detail.  - Distal colonic diverticulosis without focal acute diverticulitis.  - Nonspecific mild wall prominence of the mid-distal sigmoid colon may be partially exaggerated by underdistention.  - No bowel obstruction.   - Small nonspecific retroperitoneal lymph nodes which are not individually pathologically enlarged.  - Nonspecific mild fat stranding of the retroperitoneum. No retroperitoneal fluid collection.  - Subcentimeter low-attenuation lesion in the dome of the left hepatic lobe too small to characterize but unchanged from prior, more likely benign. No new focal hepatic lesion.    MRI rectum 4/2/24: Favorable treatment response. The previous upper-mid rectal mass has significantly regressed, however there is still small focus of restricted diffusion in the mass posteriorly which could reflect residual tumor. Previous involvement of the mesorectal fascia and peritoneal reflection is no longer visualized, although there appear to be posttreatment related fibrosis at this site. The previously enlarged presacral lymph nodes have regressed in size,  the dominant nodes from 7 mm short axis to 3mm. No current lymphadenopathy identified.    Cardiac stress test 10/20/23: Normal Lexiscan Myoview cardiac perfusion stress test. No evidence of ischemia or myocardial infarction by perfusion imaging. Normal left ventricular systolic function, ejection fraction 68%. None invasive risk stratification is low risk.    Colonoscopy 9/19/24: Scheduled.     Colonoscopy 9/8/23 (in [place]): Prep of the colon was poor. Hemorrhoids found on perianal exam. Stool in the rectum and in the recto-sigmoid colon. Rule out malignancy, completely obstructing tumor in the recto-sigmoid colon. Biopsied. Tattooed. Path demonstrating invasive adenocarcinoma. MMR intact.     Current smoker- 1/4 ppd/No ETOH/No Illicit drug use  No family history of CRC or IBD  PMH: MI, coronary stents about 10 years ago, HTN, Anal cancer, A.fib  PSH: open cholecystectomy, open appendectomy, open hysterectomy, vascular surgery on right groin for blockage  Employment: Retired    Past Medical History:   Diagnosis Date    Cholelithiasis     Coronary artery disease     High cholesterol     History of heart attack 2015    History of rectal or anal cancer     20 yrs ago    Hypertension     Presence of dental prosthetic device (complete) (partial)     upper and lower partail    Psoriasis     Rectal cancer (Multi)     Wears glasses        Past Surgical History:   Procedure Laterality Date    ANGIOPLASTY      ANUS SURGERY      APPENDECTOMY      CHOLECYSTECTOMY      COLONOSCOPY      CORONARY ANGIOPLASTY WITH STENT PLACEMENT      FLEXIBLE SIGMOIDOSCOPY      HYSTERECTOMY      LAPAROSCOPY DIAGNOSTIC / BIOPSY / ASPIRATION / LYSIS      10/27/2023 noted bulk disease    PELVIC LAPAROSCOPY       Current Outpatient Medications on File Prior to Visit   Medication Sig Dispense Refill    acetaminophen (Tylenol) 325 mg tablet Take 2 tablets (650 mg) by mouth every 6 hours if needed for mild pain (1 - 3).      aspirin 81 mg chewable  tablet Chew 1 tablet (81 mg) once daily. 90 tablet 3    cyanocobalamin, vitamin B-12, (VITAMIN B-12 ORAL) Take 1,000 mcg by mouth once daily at bedtime.      lisinopril 10 mg tablet Take 1 tablet (10 mg) by mouth 2 times a day. 180 tablet 3    metoprolol succinate XL (Toprol-XL) 25 mg 24 hr tablet Take 1 tablet (25 mg) by mouth once daily. Do not crush or chew. 90 tablet 3    omeprazole (PriLOSEC) 20 mg DR capsule Take 1 capsule (20 mg) by mouth once daily. Do not crush or chew.      prochlorperazine (Compazine) 5 mg tablet Take 1 tablet (5 mg) by mouth every 6 hours if needed for nausea or vomiting.      rosuvastatin (Crestor) 5 mg tablet Take 1 tablet (5 mg) by mouth once daily. (Patient taking differently: Take 1 tablet (5 mg) by mouth once daily at bedtime.) 90 tablet 3     No current facility-administered medications on file prior to visit.       No Known Allergies    Review of Systems   Constitutional:  Negative for activity change, appetite change, chills, diaphoresis, fatigue, fever and unexpected weight change.   Respiratory:  Negative for cough, chest tightness and shortness of breath.    Cardiovascular:  Negative for chest pain, palpitations and leg swelling.   Gastrointestinal:  Positive for abdominal pain. Negative for abdominal distention, anal bleeding, blood in stool, constipation, diarrhea, nausea, rectal pain and vomiting.        Peristomal pain   Endocrine: Negative for polydipsia, polyphagia and polyuria.   Genitourinary:  Negative for difficulty urinating, dysuria, frequency, hematuria and pelvic pain.   Skin:  Negative for color change.   Neurological:  Negative for dizziness, seizures and light-headedness.   Hematological: Negative.    All other systems reviewed and are negative.      Physical Exam  Constitutional:       General: He is not in acute distress.     Appearance: Normal appearance. He is not ill-appearing.   HENT:      Head: Normocephalic.      Mouth/Throat:      Mouth: Mucous  membranes are moist.   Eyes:      Extraocular Movements: Extraocular movements intact.      Pupils: Pupils are equal, round, and reactive to light.   Cardiovascular:      Rate and Rhythm: Normal rate and regular rhythm.      Pulses: Normal pulses.      Heart sounds: Normal heart sounds. No murmur heard.  Pulmonary:      Effort: Pulmonary effort is normal. No respiratory distress.      Breath sounds: Normal breath sounds. No wheezing, rhonchi or rales.   Chest:      Chest wall: No tenderness.   Abdominal:      General: There is no distension.      Palpations: Abdomen is soft. There is no mass.      Tenderness: There is no abdominal tenderness. There is no guarding or rebound.      Hernia: No hernia is present.      Comments: Well healed small periumbilical incisional scar. RLQ ostomy with non bloody green liquid stool output. Healing RLQ, LLQ, and midline incision. No signs of infection. No inguinal adenopathy bilaterally.   Musculoskeletal:         General: No swelling or deformity. Normal range of motion.      Cervical back: Normal range of motion and neck supple. No rigidity.      Right lower leg: No edema.      Left lower leg: No edema.   Lymphadenopathy:      Cervical: No cervical adenopathy.   Skin:     General: Skin is warm and dry.      Capillary Refill: Capillary refill takes less than 2 seconds.      Coloration: Skin is not jaundiced or pale.   Neurological:      General: No focal deficit present.      Mental Status: He is alert and oriented to person, place, and time. Mental status is at baseline.   Psychiatric:         Mood and Affect: Mood normal.         Behavior: Behavior normal.         Thought Content: Thought content normal.         Judgment: Judgment normal.           Assessment and Plan:   #Upper rectal adenocarcinoma, MMR intact per review of OSH pathology report, S/P diagnostic laparoscopy with some colonic mobilization and THERON at [facility] 10/27/2023 with aborted attempt at oncologic resection  followed by chemotherapy (FOLFOX), final 8th cycle 3/4/2024, S/P robotic converted to open LAR, mobilization splenic flexure, CRA, flexible sigmoidoscopy, DLI 5/17/2024, final pathology pT3N1b, negative margins, 2/40LNs involved, positive PNI, positive large vessel (venous) extramural invasion  #Attention ileostomy  #Remote hx anal Ca S/P primary resection and CRT    -  Doing well; no plans of adjuvant therapy  -  Colonoscopy next week  -  GGE week after  -  Tentative date for ileostomy reversal is 10/21/2024  -  PAT  -  R/B/A discussed with patient   -  Expected perioperative course reviewed with patient  -  No need for bowel prep prior to colonoscopy or surgery given presence of DLI    Gustavo Harp MD   9/10/2024  12:46 PM

## 2024-09-10 ENCOUNTER — APPOINTMENT (OUTPATIENT)
Dept: SURGERY | Facility: CLINIC | Age: 69
End: 2024-09-10
Payer: COMMERCIAL

## 2024-09-10 VITALS
DIASTOLIC BLOOD PRESSURE: 69 MMHG | WEIGHT: 168 LBS | HEIGHT: 66 IN | BODY MASS INDEX: 27 KG/M2 | HEART RATE: 79 BPM | SYSTOLIC BLOOD PRESSURE: 108 MMHG

## 2024-09-10 DIAGNOSIS — Z43.2 ATTENTION TO ILEOSTOMY (MULTI): ICD-10-CM

## 2024-09-10 DIAGNOSIS — Z85.048 HISTORY OF ANAL CANCER: ICD-10-CM

## 2024-09-10 DIAGNOSIS — C20 RECTAL CANCER (MULTI): Primary | ICD-10-CM

## 2024-09-10 PROCEDURE — 3008F BODY MASS INDEX DOCD: CPT | Performed by: STUDENT IN AN ORGANIZED HEALTH CARE EDUCATION/TRAINING PROGRAM

## 2024-09-10 PROCEDURE — 1159F MED LIST DOCD IN RCRD: CPT | Performed by: STUDENT IN AN ORGANIZED HEALTH CARE EDUCATION/TRAINING PROGRAM

## 2024-09-10 PROCEDURE — 99214 OFFICE O/P EST MOD 30 MIN: CPT | Performed by: STUDENT IN AN ORGANIZED HEALTH CARE EDUCATION/TRAINING PROGRAM

## 2024-09-10 RX ORDER — METRONIDAZOLE 250 MG/1
TABLET ORAL
Qty: 3 TABLET | Refills: 0 | Status: SHIPPED | OUTPATIENT
Start: 2024-09-10

## 2024-09-10 RX ORDER — GABAPENTIN 100 MG/1
CAPSULE ORAL
Qty: 3 CAPSULE | Refills: 0 | Status: SHIPPED | OUTPATIENT
Start: 2024-09-10

## 2024-09-10 RX ORDER — NEOMYCIN SULFATE 500 MG/1
TABLET ORAL
Qty: 6 TABLET | Refills: 0 | Status: SHIPPED | OUTPATIENT
Start: 2024-09-10

## 2024-09-10 ASSESSMENT — ENCOUNTER SYMPTOMS
POLYDIPSIA: 0
POLYPHAGIA: 0

## 2024-09-19 ENCOUNTER — APPOINTMENT (OUTPATIENT)
Dept: GASTROENTEROLOGY | Facility: HOSPITAL | Age: 69
End: 2024-09-19
Payer: COMMERCIAL

## 2024-09-23 ENCOUNTER — HOSPITAL ENCOUNTER (OUTPATIENT)
Dept: RADIOLOGY | Facility: HOSPITAL | Age: 69
Discharge: HOME | End: 2024-09-23
Payer: COMMERCIAL

## 2024-09-23 DIAGNOSIS — C20 RECTAL CANCER (MULTI): ICD-10-CM

## 2024-09-23 PROCEDURE — 74270 X-RAY XM COLON 1CNTRST STD: CPT

## 2024-09-23 PROCEDURE — 74270 X-RAY XM COLON 1CNTRST STD: CPT | Performed by: RADIOLOGY

## 2024-09-23 PROCEDURE — 2550000001 HC RX 255 CONTRASTS: Performed by: STUDENT IN AN ORGANIZED HEALTH CARE EDUCATION/TRAINING PROGRAM

## 2024-09-25 ENCOUNTER — APPOINTMENT (OUTPATIENT)
Dept: RADIOLOGY | Facility: HOSPITAL | Age: 69
End: 2024-09-25
Payer: COMMERCIAL

## 2024-09-25 DIAGNOSIS — Z12.31 SCREENING MAMMOGRAM FOR BREAST CANCER: ICD-10-CM

## 2024-09-30 NOTE — PROGRESS NOTES
[facility]           Radiation Oncology      [id number] [place] [id number]        O: [phone]        F: [phone]       [facility]                   Dr. Jefry Crum MD PhD    FOLLOW-UP NOTE     Date of Service: 2024  Patient ID: America Harmon   : 1955  MRN: [id number]   Acct Number: [id number]       NAME:  America Harmon    :  1955 69 y.o. female     PCP: Sheryl, Pcp    REFERRING PROVIDER: Christine    DIAGNOSIS:  1. Rectal cancer (HCC)        STAGING: Cancer Staging   Rectal cancer (HCC)  Staging form: Colon And Rectum, AJCC 8th Edition  - Clinical: cT3, cN2, cM0 - Signed by Jefry Crum MD on 2024      PRIOR TREATMENT: None    TIME SINCE TREATMENT: Not applicable    RECENT HISTORY: America Harmon returns for follow-up approximately 8 months after having been seen initially in consultation to discuss the role and rationale of radiation therapy.  She completed 8 cycles of FOLFOX-6 and on 2024 restaging CT of the chest abdomen and pelvis revealed some irregular rectal wall thickening and stable pulmonary nodules.  On 2024 the patient underwent a flexible sigmoid anoscopy and there was persistent disease in the left lateral position at the anal rectal ring.  Biopsy revealed persistent adenocarcinoma.  Her case was presented at multidisciplinary tumor board.  Recommendation was for surgical resection.      On 2024 the patient underwent an open low anterior resection with mobilization of the splenic flexure and diverting loop ileostomy.  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  Margins were negative.  The patient saw colorectal surgery in follow-up on 2024 and referral to medical oncology was made.  The patient saw medical oncology on 2024 and discussed the high risk of recurrent disease.  Referral back to

## 2024-10-04 ASSESSMENT — DUKE ACTIVITY SCORE INDEX (DASI)
CAN YOU DO MODERATE WORK AROUND THE HOUSE LIKE VACUUMING, SWEEPING FLOORS OR CARRYING GROCERIES: YES
CAN YOU CLIMB A FLIGHT OF STAIRS OR WALK UP A HILL: YES
CAN YOU TAKE CARE OF YOURSELF (EAT, DRESS, BATHE, OR USE TOILET): YES
CAN YOU HAVE SEXUAL RELATIONS: YES
CAN YOU DO YARD WORK LIKE RAKING LEAVES, WEEDING OR PUSHING A MOWER: YES
TOTAL_SCORE: 50.2
CAN YOU WALK INDOORS, SUCH AS AROUND YOUR HOUSE: YES
DASI METS SCORE: 8.9
CAN YOU DO LIGHT WORK AROUND THE HOUSE LIKE DUSTING OR WASHING DISHES: YES
CAN YOU RUN A SHORT DISTANCE: YES
CAN YOU WALK A BLOCK OR TWO ON LEVEL GROUND: YES
CAN YOU PARTICIPATE IN MODERATE RECREATIONAL ACTIVITIES LIKE GOLF, BOWLING, DANCING, DOUBLES TENNIS OR THROWING A BASEBALL OR FOOTBALL: YES
CAN YOU PARTICIPATE IN STRENOUS SPORTS LIKE SWIMMING, SINGLES TENNIS, FOOTBALL, BASKETBALL, OR SKIING: YES
CAN YOU DO HEAVY WORK AROUND THE HOUSE LIKE SCRUBBING FLOORS OR LIFTING AND MOVING HEAVY FURNITURE: NO

## 2024-10-04 ASSESSMENT — LIFESTYLE VARIABLES: SMOKING_STATUS: SMOKER

## 2024-10-04 ASSESSMENT — ACTIVITIES OF DAILY LIVING (ADL): ADL_SCORE: 0

## 2024-10-07 ENCOUNTER — PRE-ADMISSION TESTING (OUTPATIENT)
Dept: PREADMISSION TESTING | Facility: HOSPITAL | Age: 69
End: 2024-10-07
Payer: COMMERCIAL

## 2024-10-07 ENCOUNTER — LAB (OUTPATIENT)
Dept: LAB | Facility: LAB | Age: 69
End: 2024-10-07
Payer: COMMERCIAL

## 2024-10-07 VITALS
SYSTOLIC BLOOD PRESSURE: 146 MMHG | RESPIRATION RATE: 16 BRPM | HEART RATE: 66 BPM | OXYGEN SATURATION: 98 % | WEIGHT: 164.68 LBS | HEIGHT: 66 IN | TEMPERATURE: 97.5 F | DIASTOLIC BLOOD PRESSURE: 79 MMHG | BODY MASS INDEX: 26.47 KG/M2

## 2024-10-07 DIAGNOSIS — C20 RECTAL CANCER (MULTI): ICD-10-CM

## 2024-10-07 DIAGNOSIS — Z43.2 ATTENTION TO ILEOSTOMY: ICD-10-CM

## 2024-10-07 DIAGNOSIS — Z01.818 PREOP TESTING: ICD-10-CM

## 2024-10-07 DIAGNOSIS — Z01.818 PREOP TESTING: Primary | ICD-10-CM

## 2024-10-07 DIAGNOSIS — Z85.048 HISTORY OF ANAL CANCER: ICD-10-CM

## 2024-10-07 LAB
ABO GROUP (TYPE) IN BLOOD: NORMAL
ANION GAP SERPL CALC-SCNC: 12 MMOL/L (ref 10–20)
ANTIBODY SCREEN: NORMAL
BUN SERPL-MCNC: 13 MG/DL (ref 6–23)
CALCIUM SERPL-MCNC: 10.6 MG/DL (ref 8.6–10.3)
CHLORIDE SERPL-SCNC: 105 MMOL/L (ref 98–107)
CO2 SERPL-SCNC: 25 MMOL/L (ref 21–32)
CREAT SERPL-MCNC: 1.03 MG/DL (ref 0.5–1.05)
EGFRCR SERPLBLD CKD-EPI 2021: 60 ML/MIN/1.73M*2
ERYTHROCYTE [DISTWIDTH] IN BLOOD BY AUTOMATED COUNT: 14.3 % (ref 11.5–14.5)
GLUCOSE SERPL-MCNC: 122 MG/DL (ref 74–99)
HCT VFR BLD AUTO: 43.8 % (ref 36–46)
HGB BLD-MCNC: 14 G/DL (ref 12–16)
MCH RBC QN AUTO: 28.7 PG (ref 26–34)
MCHC RBC AUTO-ENTMCNC: 32 G/DL (ref 32–36)
MCV RBC AUTO: 90 FL (ref 80–100)
NRBC BLD-RTO: 0 /100 WBCS (ref 0–0)
PLATELET # BLD AUTO: 303 X10*3/UL (ref 150–450)
POTASSIUM SERPL-SCNC: 4.6 MMOL/L (ref 3.5–5.3)
RBC # BLD AUTO: 4.87 X10*6/UL (ref 4–5.2)
RH FACTOR (ANTIGEN D): NORMAL
SODIUM SERPL-SCNC: 137 MMOL/L (ref 136–145)
WBC # BLD AUTO: 9.8 X10*3/UL (ref 4.4–11.3)

## 2024-10-07 PROCEDURE — 86901 BLOOD TYPING SEROLOGIC RH(D): CPT

## 2024-10-07 PROCEDURE — 86900 BLOOD TYPING SEROLOGIC ABO: CPT

## 2024-10-07 PROCEDURE — 36415 COLL VENOUS BLD VENIPUNCTURE: CPT

## 2024-10-07 PROCEDURE — 80048 BASIC METABOLIC PNL TOTAL CA: CPT

## 2024-10-07 PROCEDURE — 99202 OFFICE O/P NEW SF 15 MIN: CPT | Performed by: NURSE PRACTITIONER

## 2024-10-07 PROCEDURE — 85027 COMPLETE CBC AUTOMATED: CPT

## 2024-10-07 PROCEDURE — 86850 RBC ANTIBODY SCREEN: CPT

## 2024-10-07 PROCEDURE — 87081 CULTURE SCREEN ONLY: CPT | Mod: STJLAB

## 2024-10-07 ASSESSMENT — PAIN - FUNCTIONAL ASSESSMENT: PAIN_FUNCTIONAL_ASSESSMENT: 0-10

## 2024-10-07 NOTE — CPM/PAT H&P
"CPM/PAT Evaluation       Name: Janis Ibanez (Janis Ibanez \"Marquita\")  /Age: 1955/69 y.o.     In-Person       Chief Complaint: Ileostomy reversal     HPI69 year old female for Ileostomy Closure, possible open 10/21/24. Patient diagnosed with stage 3 rectal cancer earlier this year. Received chemo that finished in 2024 and had surgical intervention with ileostomy placed in May. Patient states she has been feeling well. Patient was previously diagnosed with anal cancer 20 years ago and received chemo and radiation at that time.     Past Medical History:   Diagnosis Date    Cholelithiasis     Coronary artery disease     High cholesterol     History of heart attack     History of rectal or anal cancer     20 yrs ago    Hypertension     Presence of dental prosthetic device (complete) (partial)     upper and lower partail    Psoriasis     Rectal cancer (Multi)     Wears glasses        Past Surgical History:   Procedure Laterality Date    ANGIOPLASTY      ANUS SURGERY      APPENDECTOMY      CHOLECYSTECTOMY      COLONOSCOPY      CORONARY ANGIOPLASTY WITH STENT PLACEMENT      FLEXIBLE SIGMOIDOSCOPY      HYSTERECTOMY      ILEOSTOMY      LAPAROSCOPY DIAGNOSTIC / BIOPSY / ASPIRATION / LYSIS      10/27/2023 noted bulk disease    PELVIC LAPAROSCOPY         Patient Sexual activity questions deferred to the physician.    Family History   Problem Relation Name Age of Onset    Hypertension Mother      Heart disease Mother      Other (cardiac stents) Mother      Hypertension Father      Heart attack Father      Prostate cancer Father         No Known Allergies    Prior to Admission medications    Medication Sig Start Date End Date Taking? Authorizing Provider   acetaminophen (Tylenol) 325 mg tablet Take 2 tablets (650 mg) by mouth every 6 hours if needed for mild pain (1 - 3). 24   Amanda Spencer, APRN-CNP   aspirin 81 mg chewable tablet Chew 1 tablet (81 mg) once daily. 24  Kolton Lam" MD   cyanocobalamin (Vitamin B-12) 1,000 mcg tablet Take 1 tablet (1,000 mcg) by mouth once daily at bedtime.    Historical Provider, MD   gabapentin (Neurontin) 100 mg capsule Take one capsule by mouth at bedtime starting 3 days before surgery 9/10/24   Gustavo Harp MD   lisinopril 10 mg tablet Take 1 tablet (10 mg) by mouth 2 times a day.  Patient taking differently: Take 1 tablet (10 mg) by mouth once daily. 2/13/24 2/12/25  Kolton Lam MD   metoprolol succinate XL (Toprol-XL) 25 mg 24 hr tablet Take 1 tablet (25 mg) by mouth once daily. Do not crush or chew. 2/13/24 2/12/25  Kolton Lam MD   metroNIDAZOLE (Flagyl) 250 mg tablet Take one tablet by mouth at 6:00pm, 7:00pm, 11:00pm on the day prior to surgery 9/10/24   Gustavo Harp MD   neomycin (Mycifradin) 500 mg tablet Take two tablets (1000mg) by mouth at 6:00pm, 7:00pm, and 11:00pm on the day prior to surgery 9/10/24   Gustvao Harp MD   omeprazole (PriLOSEC) 20 mg DR capsule Take 1 capsule (20 mg) by mouth once daily. Do not crush or chew.    Historical Provider, MD   rosuvastatin (Crestor) 5 mg tablet Take 1 tablet (5 mg) by mouth once daily.  Patient taking differently: Take 1 tablet (5 mg) by mouth once daily at bedtime. 2/13/24 2/12/25  Kolton Lam MD   prochlorperazine (Compazine) 5 mg tablet Take 1 tablet (5 mg) by mouth every 6 hours if needed for nausea or vomiting.  10/6/24  Historical Provider, MD      Refer to updated medication list on file      Constitutional: Negative for fever, chills, or sweats   ENMT: Negative for nasal discharge, congestion, ear pain, mouth pain, throat pain   Respiratory: Negative for cough, wheezing, shortness of breath   Cardiac: Negative for chest pain, dyspnea on exertion, palpitations   Gastrointestinal: Negative for nausea, vomiting, abdominal pain Positive for ileostomy with bag  Genitourinary: Negative for dysuria, flank pain, frequency, hematuria   Musculoskeletal: Negative for decreased ROM, pain,  swelling, weakness   Neurological: Negative for dizziness, confusion, headache  Psychiatric: Negative for mood changes   Skin: Negative for itching, rash, ulcer  Positive for psoriasis   Hematologic/Lymph: Negative for bruising, easy bleeding  Allergic/Immunologic: Negative itching, sneezing, swelling       Physical Exam  Constitutional:       Appearance: Normal appearance.   HENT:      Head: Normocephalic.   Eyes:      Extraocular Movements: Extraocular movements intact.      Comments: glasses   Cardiovascular:      Rate and Rhythm: Normal rate and regular rhythm.      Heart sounds: Normal heart sounds.   Pulmonary:      Effort: Pulmonary effort is normal.      Breath sounds: Normal breath sounds.   Abdominal:      General: Bowel sounds are normal.      Palpations: Abdomen is soft.   Musculoskeletal:         General: Normal range of motion.      Cervical back: Normal range of motion.   Skin:     General: Skin is warm and dry.   Neurological:      Mental Status: He is alert and oriented to person, place, and time.   Psychiatric:         Mood and Affect: Mood normal.          PAT AIRWAY:   Airway:     Neck ROM::  Full   partials    Anesthesia:  Patient denies any anesthesia complications.      Visit Vitals  /79   Pulse 66   Temp 36.4 °C (97.5 °F) (Temporal)   Resp 16       DASI Risk Score      Flowsheet Row Pre-Admission Testing from 10/7/2024 in [facility] Pre-Admission Testing from 5/8/2024 in [facility]   Can you take care of yourself (eat, dress, bathe, or use toilet)?  2.75 filed at 10/04/2024 1139 2.75 filed at 05/07/2024 1348   Can you walk indoors, such as around your house? 1.75 filed at 10/04/2024 1139 1.75 filed at 05/07/2024 1348   Can you walk a block or two on level ground?  2.75 filed at 10/04/2024 1139 2.75 filed at 05/07/2024 1348   Can you climb a flight of stairs or walk up a hill? 5.5 filed at 10/04/2024 1139 5.5 filed at 05/07/2024 1348   Can you run a short  distance? 8 filed at 10/04/2024 1139 8 filed at 05/07/2024 1348   Can you do light work around the house like dusting or washing dishes? 2.7 filed at 10/04/2024 1139 2.7 filed at 05/07/2024 1348   Can you do moderate work around the house like vacuuming, sweeping floors or carrying groceries? 3.5 filed at 10/04/2024 1139 3.5 filed at 05/07/2024 1348   Can you do heavy work around the house like scrubbing floors or lifting and moving heavy furniture?  0 filed at 10/04/2024 1139 8 filed at 05/07/2024 1348   Can you do yard work like raking leaves, weeding or pushing a mower? 4.5 filed at 10/04/2024 1139 4.5 filed at 05/07/2024 1348   Can you have sexual relations? 5.25 filed at 10/04/2024 1139 5.25 filed at 05/07/2024 1348   Can you participate in moderate recreational activities like golf, bowling, dancing, doubles tennis or throwing a baseball or football? 6 filed at 10/04/2024 1139 6 filed at 05/07/2024 1348   Can you participate in strenous sports like swimming, singles tennis, football, basketball, or skiing? 7.5 filed at 10/04/2024 1139 7.5 filed at 05/07/2024 1348   DASI SCORE 50.2 filed at 10/04/2024 1139 58.2 filed at 05/07/2024 1348   METS Score (Will be calculated only when all the questions are answered) 8.9 filed at 10/04/2024 1139 9.9 filed at 05/07/2024 1348          Caprini DVT Assessment      Flowsheet Row Pre-Admission Testing from 10/7/2024 in [facility] Admission (Discharged) from 5/17/2024 in [facility] with Gustavo Harp MD   DVT Score 7 filed at 10/04/2024 1142 14 filed at 05/17/2024 1536   Medical Factors Present cancer, chemotherapy, or previous malignancy filed at 10/04/2024 1142 --   Surgical Factors Major surgery planned, lasting 2-3 hours filed at 10/04/2024 1142 --   BMI 30 or less filed at 10/04/2024 1142 31-40 (Obesity) filed at 05/17/2024 1536   RETIRED: Current Status -- COPD, Major surgery planned, lasting 2-3 hours, Present cancer or  chemotherapy filed at 05/17/2024 1536   RETIRED: History -- Prior major surgery, Previous malignancy filed at 05/17/2024 1536   RETIRED: Age -- 60-75 years filed at 05/17/2024 1536          Modified Frailty Index      Flowsheet Row Pre-Admission Testing from 10/7/2024 in [facility] Pre-Admission Testing from 5/8/2024 in [facility]   Non-independent functional status (problems with dressing, bathing, personal grooming, or cooking) 0 filed at 10/04/2024 1140 0 filed at 05/07/2024 1349   History of diabetes mellitus  0 filed at 10/04/2024 1140 0 filed at 05/07/2024 1349   History of COPD 0 filed at 10/04/2024 1140 0 filed at 05/07/2024 1349   History of CHF No filed at 10/04/2024 1140 No filed at 05/07/2024 1349   History of MI 0.0909 filed at 10/04/2024 1140 0.0909 filed at 05/07/2024 1349   History of Percutaneous Coronary Intervention, Cardiac Surgery, or Angina 0.0909 filed at 10/04/2024 1140 0.0909 filed at 05/07/2024 1349   Hypertension requiring the use of medication  0.0909 filed at 10/04/2024 1140 0.0909 filed at 05/07/2024 1349   Peripheral vascular disease 0 filed at 10/04/2024 1140 0 filed at 05/07/2024 1349   Impaired sensorium (cognitive impairement or loss, clouding, or delirium) 0 filed at 10/04/2024 1140 0 filed at 05/07/2024 1349   TIA or CVA withouy residual deficit 0 filed at 10/04/2024 1140 0 filed at 05/07/2024 1349   Cerebrovascular accident with deficit 0 filed at 10/04/2024 1140 0 filed at 05/07/2024 1349   Modified Frailty Index Calculator .2727 filed at 10/04/2024 1140 .2727 filed at 05/07/2024 1349          CHADS2 Stroke Risk  Current as of 46 minutes ago        N/A 3 to 100%: High Risk   2 to < 3%: Medium Risk   0 to < 2%: Low Risk     Last Change: N/A          This score determines the patient's risk of having a stroke if the patient has atrial fibrillation.        This score is not applicable to this patient. Components are not calculated.          Revised  Cardiac Risk Index      Flowsheet Row [facility]   High-Risk Surgery (Intraperitoneal, Intrathoracic,Suprainguinal vascular) 0 filed at 10/04/2024 1139 0 filed at 05/07/2024 1349   History of ischemic heart disease (History of MI, History of positive exercuse test, Current chest paint considered due to myocardial ischemia, Use of nitrate therapy, ECG with pathological Q Waves) 0 filed at 10/04/2024 1139 0 filed at 05/07/2024 1349   History of congestive heart failure (pulmonary edemia, bilateral rales or S3 gallop, Paroxysmal nocturnal dyspnea, CXR showing pulmonary vascular redistribution) 0 filed at 10/04/2024 1139 0 filed at 05/07/2024 1349   History of cerebrovascular disease (Prior TIA or stroke) 0 filed at 10/04/2024 1139 0 filed at 05/07/2024 1349   Pre-operative insulin treatment 0 filed at 10/04/2024 1139 0 filed at 05/07/2024 1349   Pre-operative creatinine>2 mg/dl 0 filed at 10/04/2024 1139 --   Revised Cardiac Risk Calculator 0 filed at 10/04/2024 1139 --          Apfel Simplified Score      Flowsheet Row [facility]   Smoking status 0 filed at 10/04/2024 1140 0 filed at 05/07/2024 1349   History of motion sickness or PONV  0 filed at 10/04/2024 1140 0 filed at 05/07/2024 1349   Gender - Female 1=Yes filed at 10/04/2024 1140 --          Risk Analysis Index Results This Encounter         10/4/2024  1140             Do you live in a place other than your own home?: 0    When did you begin living in the place you are currently residing?: Greater than one year ago    Any kidney failure, kidney not working well, or seeing a kidney doctor (nephrologist)? If yes, was this for kidney stones or another problem?: 0 No    Any history of chronic (long-term) congestive heart  failure (CHF)?: 0 No    Any shortness of breath when resting?: 0 No    In the past five years, have you been diagnosed with or treated for cancer?: No    During the last 3 months has it become difficult for you to remember things or organize your thoughts?: 0 No    Have you lost weight of 10 pounds or more in the past 3 months without trying?: 0 No    Do you have any loss of appetitie?: 0 No    Getting Around (Mobility): 0 Can get around without help    Eatin Can plan and prepare own meals    Toiletin Can use toilet without any help    Personal Hygiene (Bathing, Hand Washing, Changing Clothes): 0 Can shower or bathe without any help    SUÁREZ Cancer History: Patient does not indicate history of cancer    Total Risk Analysis Index Score Without Cancer: 20    Total Risk Analysis Index Score: 20          Stop Bang Score      Flowsheet Row Pre-Admission Testing from 10/7/2024 in [facility] Admission (Discharged) from 2024 in [place] [address] [place] with Gustavo Harp MD   Do you snore loudly? 1 filed at 10/04/2024 1138 0 filed at 2024 0809   Do you often feel tired or fatigued after your sleep? 0 filed at 10/04/2024 1138 0 filed at 2024 0809   Has anyone ever observed you stop breathing in your sleep? 0 filed at 10/04/2024 1138 0 filed at 2024 0809   Do you have or are you being treated for high blood pressure? 1 filed at 10/04/2024 1138 1 filed at 2024 0809   Recent BMI (Calculated) 27.1 filed at 10/04/2024 1138 29.7 filed at 2024 0809   Is BMI greater than 35 kg/m2? 0=No filed at 10/04/2024 1138 0=No filed at 2024 0809   Age older than 50 years old? 1=Yes filed at 10/04/2024 1138 1=Yes filed at 2024 0809   Is your neck circumference greater than 17 inches (Male) or 16 inches (Female)? 0 filed at 10/04/2024 1138 --   Gender - Male 0=No filed at 10/04/2024 1138 0=No filed at 2024 0809   STOP-BANG Total Score 3 filed at 10/04/2024 1138  --            Assessment and Plan:     Preop:   OR with Dr. Harp  CBC, BMP, T&S today  MRSA swab and oral mouth rinse ordered per protocol   EKG on file 5/8/24. Sinus bradycardia at 57 bpm with sinus arrhythmia.     Cardiac:  CAD with cardiac stent. Follows with Dr. Lam.     TTE 10/24/23:  CONCLUSIONS:   1. Left ventricular systolic function is normal with a 55-60% estimated ejection fraction.   2. Spectral Doppler shows an impaired relaxation pattern of left ventricular diastolic filling.   3. Aortic valve sclerosis.   4. Mild aortic valve regurgitation.    Nuclear Stress Test 10/20/23:  IMPRESSION:  Normal Lexiscan Myoview cardiac perfusion stress test.  No evidence of ischemia or myocardial infarction by perfusion imaging.  Normal left ventricular systolic function, ejection fraction 68%.  None invasive risk stratification is low risk.    HTN: on medication  Hyperlipidemia: on Statin      Pulmonary:   STOP-BANG score of 3. Intermediate risk of obstructive sleep apnea.   Smoker. Smoking cessation discussed.    GI:  Apfel: 1 points 10% risk for post operative N/V  GERD: on medication    Anesthesia: No history of anesthesia complications. No anesthesia concerns.      *See risk scores as previously documented

## 2024-10-07 NOTE — PREPROCEDURE INSTRUCTIONS
Medication List            Accurate as of October 7, 2024 11:13 AM. Always use your most recent med list.                acetaminophen 325 mg tablet  Commonly known as: Tylenol  Take 2 tablets (650 mg) by mouth every 6 hours if needed for mild pain (1 - 3).  Medication Adjustments for Surgery: Take/Use as prescribed     aspirin 81 mg chewable tablet  Chew 1 tablet (81 mg) once daily.  Additional Medication Adjustments for Surgery: Take last dose 7 days before surgery     cyanocobalamin 1,000 mcg tablet  Commonly known as: Vitamin B-12  Additional Medication Adjustments for Surgery: Take last dose 7 days before surgery     gabapentin 100 mg capsule  Commonly known as: Neurontin  Take one capsule by mouth at bedtime starting 3 days before surgery  Medication Adjustments for Surgery: Take/Use as prescribed     lisinopril 10 mg tablet  Take 1 tablet (10 mg) by mouth 2 times a day.  Medication Adjustments for Surgery: Take last dose 1 day (24 hours) before surgery     metoprolol succinate XL 25 mg 24 hr tablet  Commonly known as: Toprol-XL  Take 1 tablet (25 mg) by mouth once daily. Do not crush or chew.  Medication Adjustments for Surgery: Take/Use as prescribed     metroNIDAZOLE 250 mg tablet  Commonly known as: Flagyl  Take one tablet by mouth at 6:00pm, 7:00pm, 11:00pm on the day prior to surgery  Medication Adjustments for Surgery: Take/Use as prescribed     neomycin 500 mg tablet  Commonly known as: Mycifradin  Take two tablets (1000mg) by mouth at 6:00pm, 7:00pm, and 11:00pm on the day prior to surgery  Medication Adjustments for Surgery: Take/Use as prescribed     omeprazole 20 mg DR capsule  Commonly known as: PriLOSEC  Medication Adjustments for Surgery: Take/Use as prescribed     rosuvastatin 5 mg tablet  Commonly known as: Crestor  Take 1 tablet (5 mg) by mouth once daily.  Medication Adjustments for Surgery: Take/Use as prescribed                                PRE-OPERATIVE INSTRUCTIONS    You will  receive notification one business day prior to your procedure to confirm your arrival time. It is important that you answer your phone and/or check your messages during this time. If you do not hear from the surgery center by 5 pm. the day before your procedure, please call [phone].     Please enter the building through the Outpatient entrance and take the elevator off the lobby to the 2nd floor then check in at the Outpatient Surgery desk on the 2nd floor.    INSTRUCTIONS:  Talk to your surgeon for instructions if you should stop your aspirin, blood thinner, or diabetes medicines.  DO NOT take any multivitamins or over the counter supplements for 7-10 days before surgery.  If not being admitted, you must have an adult immediately available to drive you home after surgery. We also highly recommend you have someone stay with you for the entire day and night of your surgery.  For children having surgery, a parent or legal guardian must accompany them to the surgery center. If this is not possible, please call [phone] to make additional arrangements.  For adults who are unable to consent or make medical decisions for themselves, a legal guardian or Power of  must accompany them to the surgery center. If this is not possible, please call [phone] to make additional arrangements.  Wear comfortable, loose fitting clothing.  All jewelry and piercings must be removed. If you are unable to remove an item or have a dermal piercing, please be sure to tell the nurse when you arrive for surgery.  Nail polish and make-up must be removed.  Avoid smoking or consuming alcohol for 24 hours before surgery.  To help prevent infection, please take a shower/bath and wash your hair the night before and/or morning of surgery (or follow other specific bathing instructions provided).    Preoperative Fasting Guidelines    Why must I stop eating and drinking near surgery time?  With sedation, food or liquid in your  stomach can enter your lungs causing serious complications  Increases nausea and vomiting    When do I need to stop eating and drinking before my surgery?  Do not eat any solid food after midnight the night before your surgery/procedure unless otherwise instructed by your surgeon.   You may have up to 13.5 ounces of clear liquid until TWO hours before your instructed arrival time to the hospital.  This includes water, black tea/coffee, (no milk or cream) apple juice, and electrolyte drinks (Gatorade).   You may chew gum until TWO hours before your surgery/procedure      If applicable, notify your surgeons office immediately of any new skin changes that occur to the surgical limb.      If you have any questions or concerns, please call Pre-Admission Testing at [phone].

## 2024-10-09 LAB — STAPHYLOCOCCUS SPEC CULT: ABNORMAL

## 2024-10-16 ENCOUNTER — PREP FOR PROCEDURE (OUTPATIENT)
Dept: SURGERY | Facility: HOSPITAL | Age: 69
End: 2024-10-16
Payer: COMMERCIAL

## 2024-10-16 RX ORDER — SODIUM CHLORIDE, SODIUM LACTATE, POTASSIUM CHLORIDE, CALCIUM CHLORIDE 600; 310; 30; 20 MG/100ML; MG/100ML; MG/100ML; MG/100ML
75 INJECTION, SOLUTION INTRAVENOUS CONTINUOUS
Status: CANCELLED | OUTPATIENT
Start: 2024-10-16 | End: 2024-10-17

## 2024-10-17 ENCOUNTER — ANESTHESIA EVENT (OUTPATIENT)
Dept: GASTROENTEROLOGY | Facility: HOSPITAL | Age: 69
End: 2024-10-17
Payer: COMMERCIAL

## 2024-10-17 ENCOUNTER — ANESTHESIA (OUTPATIENT)
Dept: GASTROENTEROLOGY | Facility: HOSPITAL | Age: 69
End: 2024-10-17
Payer: COMMERCIAL

## 2024-10-17 ENCOUNTER — HOSPITAL ENCOUNTER (OUTPATIENT)
Dept: GASTROENTEROLOGY | Facility: HOSPITAL | Age: 69
Setting detail: OUTPATIENT SURGERY
Discharge: HOME | End: 2024-10-17
Payer: COMMERCIAL

## 2024-10-17 VITALS
DIASTOLIC BLOOD PRESSURE: 60 MMHG | RESPIRATION RATE: 18 BRPM | WEIGHT: 162 LBS | HEART RATE: 54 BPM | TEMPERATURE: 98.4 F | SYSTOLIC BLOOD PRESSURE: 116 MMHG | OXYGEN SATURATION: 98 % | BODY MASS INDEX: 26.03 KG/M2 | HEIGHT: 66 IN

## 2024-10-17 DIAGNOSIS — C20 RECTAL CANCER (MULTI): ICD-10-CM

## 2024-10-17 PROCEDURE — 3700000002 HC GENERAL ANESTHESIA TIME - EACH INCREMENTAL 1 MINUTE

## 2024-10-17 PROCEDURE — G0105 COLORECTAL SCRN; HI RISK IND: HCPCS | Performed by: STUDENT IN AN ORGANIZED HEALTH CARE EDUCATION/TRAINING PROGRAM

## 2024-10-17 PROCEDURE — 7100000009 HC PHASE TWO TIME - INITIAL BASE CHARGE

## 2024-10-17 PROCEDURE — 3700000001 HC GENERAL ANESTHESIA TIME - INITIAL BASE CHARGE

## 2024-10-17 PROCEDURE — 45378 DIAGNOSTIC COLONOSCOPY: CPT | Performed by: STUDENT IN AN ORGANIZED HEALTH CARE EDUCATION/TRAINING PROGRAM

## 2024-10-17 PROCEDURE — 7100000010 HC PHASE TWO TIME - EACH INCREMENTAL 1 MINUTE

## 2024-10-17 PROCEDURE — 2500000004 HC RX 250 GENERAL PHARMACY W/ HCPCS (ALT 636 FOR OP/ED): Performed by: ANESTHESIOLOGIST ASSISTANT

## 2024-10-17 RX ORDER — LIDOCAINE HYDROCHLORIDE 20 MG/ML
INJECTION, SOLUTION EPIDURAL; INFILTRATION; INTRACAUDAL; PERINEURAL AS NEEDED
Status: DISCONTINUED | OUTPATIENT
Start: 2024-10-17 | End: 2024-10-17

## 2024-10-17 RX ORDER — PROPOFOL 10 MG/ML
INJECTION, EMULSION INTRAVENOUS AS NEEDED
Status: DISCONTINUED | OUTPATIENT
Start: 2024-10-17 | End: 2024-10-17

## 2024-10-17 SDOH — HEALTH STABILITY: MENTAL HEALTH: CURRENT SMOKER: 1

## 2024-10-17 ASSESSMENT — ENCOUNTER SYMPTOMS
LIGHT-HEADEDNESS: 0
NAUSEA: 0
HEMATURIA: 0
UNEXPECTED WEIGHT CHANGE: 0
BLOOD IN STOOL: 0
FREQUENCY: 0
CHILLS: 0
DIZZINESS: 0
DIFFICULTY URINATING: 0
PALPITATIONS: 0
VOMITING: 0
FEVER: 0
ACTIVITY CHANGE: 0
DIARRHEA: 0
WHEEZING: 0
ABDOMINAL DISTENTION: 0
ANAL BLEEDING: 0
APPETITE CHANGE: 0
CHOKING: 0
ABDOMINAL PAIN: 0
RECTAL PAIN: 0
AGITATION: 0
FATIGUE: 0
DYSURIA: 0
CHEST TIGHTNESS: 0
COUGH: 0
SHORTNESS OF BREATH: 0
BACK PAIN: 1
CONSTIPATION: 0
WEAKNESS: 0

## 2024-10-17 ASSESSMENT — PAIN - FUNCTIONAL ASSESSMENT
PAIN_FUNCTIONAL_ASSESSMENT: 0-10

## 2024-10-17 ASSESSMENT — PAIN SCALES - GENERAL
PAINLEVEL_OUTOF10: 0 - NO PAIN

## 2024-10-17 ASSESSMENT — COLUMBIA-SUICIDE SEVERITY RATING SCALE - C-SSRS
6. HAVE YOU EVER DONE ANYTHING, STARTED TO DO ANYTHING, OR PREPARED TO DO ANYTHING TO END YOUR LIFE?: NO
1. IN THE PAST MONTH, HAVE YOU WISHED YOU WERE DEAD OR WISHED YOU COULD GO TO SLEEP AND NOT WAKE UP?: NO
2. HAVE YOU ACTUALLY HAD ANY THOUGHTS OF KILLING YOURSELF?: NO

## 2024-10-17 NOTE — Clinical Note
Pt tolerated procedure well. Abd soft and non-tender. Transported to recovery for face to face handoff and report.

## 2024-10-17 NOTE — ANESTHESIA POSTPROCEDURE EVALUATION
"Patient: Janis Ibanez \"Marquita\"    Procedure Summary       Date: 10/17/24 Room / Location: [facility]    Anesthesia Start: 0837 Anesthesia Stop: 0911    Procedure: COLONOSCOPY Diagnosis: Rectal cancer (Multi)    Scheduled Providers: Gustavo Harp MD Responsible Provider: Aby Vincent MD    Anesthesia Type: MAC ASA Status: 3            Anesthesia Type: MAC    Vitals Value Taken Time   /56 10/17/24 0911   Temp 37.1 10/17/24 0911   Pulse 68 10/17/24 0911   Resp 12 10/17/24 0911   SpO2 95 10/17/24 0911       Anesthesia Post Evaluation    Patient location during evaluation: PACU  Patient participation: complete - patient participated  Level of consciousness: awake and alert  Pain management: satisfactory to patient  Airway patency: patent  Cardiovascular status: acceptable  Respiratory status: acceptable, room air and spontaneous ventilation  Hydration status: acceptable  Postoperative Nausea and Vomiting: none        No notable events documented.    "

## 2024-10-17 NOTE — H&P (VIEW-ONLY)
"History Of Present Illness  Janis Ibanez \"Marquita\" is a 69 y.o. female presenting for colonoscopy.  History of rectal adenocarcinoma S/P LAR with CRA and DLI.  Last seen in office 9/10/2024.  Only new complaint is exacerbation of sciatic pain; pain extends from left buttocks down her left lower extremity.     Past Medical History  Past Medical History:   Diagnosis Date    Cholelithiasis     Coronary artery disease     High cholesterol     History of heart attack 2015    History of rectal or anal cancer     20 yrs ago    Hypertension     Presence of dental prosthetic device (complete) (partial)     upper and lower partail    Psoriasis     Rectal cancer (Multi)     Wears glasses        Surgical History  Past Surgical History:   Procedure Laterality Date    ANGIOPLASTY      ANUS SURGERY      APPENDECTOMY      CHOLECYSTECTOMY      COLONOSCOPY      CORONARY ANGIOPLASTY WITH STENT PLACEMENT      FLEXIBLE SIGMOIDOSCOPY      HYSTERECTOMY      ILEOSTOMY      LAPAROSCOPY DIAGNOSTIC / BIOPSY / ASPIRATION / LYSIS      10/27/2023 noted bulk disease    PELVIC LAPAROSCOPY          Social History  He reports that he has been smoking cigarettes. He started smoking about 52 years ago. He has a 13.2 pack-year smoking history. He has never used smokeless tobacco. He reports that he does not currently use alcohol. He reports current drug use. Drug: Marijuana.    Family History  Family History   Problem Relation Name Age of Onset    Hypertension Mother      Heart disease Mother      Other (cardiac stents) Mother      Hypertension Father      Heart attack Father      Prostate cancer Father          Allergies  Patient has no known allergies.    Review of Systems   Constitutional:  Negative for activity change, appetite change, chills, fatigue, fever and unexpected weight change.   Respiratory:  Negative for cough, choking, chest tightness, shortness of breath and wheezing.    Cardiovascular:  Negative for chest pain, palpitations and " "leg swelling.   Gastrointestinal:  Negative for abdominal distention, abdominal pain, anal bleeding, blood in stool, constipation, diarrhea, nausea, rectal pain and vomiting.   Genitourinary:  Negative for difficulty urinating, dysuria, frequency and hematuria.   Musculoskeletal:  Positive for back pain.   Neurological:  Negative for dizziness, weakness and light-headedness.   Psychiatric/Behavioral:  Negative for agitation.         Physical Exam  Constitutional:       General: He is not in acute distress.     Appearance: Normal appearance. He is not ill-appearing.   HENT:      Head: Normocephalic.      Right Ear: External ear normal.      Left Ear: External ear normal.      Nose: Nose normal.      Mouth/Throat:      Mouth: Mucous membranes are moist.   Eyes:      Extraocular Movements: Extraocular movements intact.      Conjunctiva/sclera: Conjunctivae normal.   Cardiovascular:      Rate and Rhythm: Normal rate and regular rhythm.      Pulses: Normal pulses.      Heart sounds: Normal heart sounds. No murmur heard.  Pulmonary:      Effort: Pulmonary effort is normal. No respiratory distress.      Breath sounds: Normal breath sounds. No wheezing, rhonchi or rales.   Chest:      Chest wall: No tenderness.   Abdominal:      General: There is no distension.      Palpations: There is no mass.      Tenderness: There is no abdominal tenderness. There is no guarding or rebound.      Hernia: No hernia is present.   Musculoskeletal:         General: No swelling or deformity.      Cervical back: Neck supple. No rigidity.      Right lower leg: No edema.      Left lower leg: No edema.   Skin:     General: Skin is warm.      Coloration: Skin is not jaundiced or pale.   Neurological:      Mental Status: He is alert.          Last Recorded Vitals  Blood pressure 138/63, pulse 67, temperature 36.3 °C (97.3 °F), temperature source Temporal, resp. rate 18, height 1.676 m (5' 6\"), weight 73.5 kg (162 lb), SpO2 98%.    Relevant " Results      No results found for this or any previous visit (from the past 24 hours).       Assessment/Plan   Assessment & Plan  Rectal cancer (Multi)      #Rectal cancer  #Attention to ileostomy  -  Colonoscopy    I spent 10 minutes in the professional and overall care of this patient.      Gustavo Harp MD

## 2024-10-17 NOTE — ANESTHESIA PREPROCEDURE EVALUATION
"Patient: Janis Ibanez \"Marquita\"    Procedure Information       Date/Time: 10/17/24 0830    Scheduled providers: Gustavo Harp MD    Procedure: COLONOSCOPY    Location: [facility]            Relevant Problems   Cardiac   (+) MI (myocardial infarction) (Multi)   (+) Mixed hyperlipidemia      GI   (+) Cancer of sigmoid colon (Multi)   (+) Malignant neoplasm of rectal ampulla (Multi)   (+) Rectal cancer (Multi)      Liver   (+) Cancer of sigmoid colon (Multi)   (+) Malignant neoplasm of rectal ampulla (Multi)   (+) Rectal cancer (Multi)      GYN   (+) Malignant neoplasm of female breast       Clinical information reviewed:   Tobacco  Allergies  Meds  Problems  Med Hx  Surg Hx  OB Status    Fam Hx  Soc Hx        NPO Detail:  NPO/Void Status  Carbohydrate Drink Given Prior to Surgery? : N  Date of Last Liquid: 10/17/24  Time of Last Liquid: 0530  Date of Last Solid: 10/15/24  Time of Last Solid: 1900  Last Intake Type: Clear fluids  Time of Last Void: 0630         Physical Exam    Airway  Mallampati: III  TM distance: >3 FB  Neck ROM: full     Cardiovascular - normal exam  Rhythm: regular  Rate: normal     Dental   (+) upper dentures     Pulmonary   Breath sounds clear to auscultation  (+) decreased breath sounds     Abdominal   (+) obese  Abdomen: soft  Bowel sounds: normal           Anesthesia Plan    History of general anesthesia?: yes  History of complications of general anesthesia?: no    ASA 3     general     The patient is a current smoker.  Patient was previously instructed to abstain from smoking on day of procedure.  Patient smoked on day of procedure.  Education provided regarding risk of obstructive sleep apnea.  intravenous induction   Anesthetic plan and risks discussed with patient.  Use of blood products discussed with patient who consented to blood products.    Plan discussed with CAA.      "

## 2024-10-20 ENCOUNTER — PREP FOR PROCEDURE (OUTPATIENT)
Dept: SURGERY | Facility: HOSPITAL | Age: 69
End: 2024-10-20
Payer: COMMERCIAL

## 2024-10-20 ENCOUNTER — ANESTHESIA EVENT (OUTPATIENT)
Dept: OPERATING ROOM | Facility: HOSPITAL | Age: 69
End: 2024-10-20
Payer: COMMERCIAL

## 2024-10-20 DIAGNOSIS — Z43.2 ATTENTION TO ILEOSTOMY: Primary | ICD-10-CM

## 2024-10-20 RX ORDER — METRONIDAZOLE 500 MG/100ML
500 INJECTION, SOLUTION INTRAVENOUS ONCE
Status: CANCELLED | OUTPATIENT
Start: 2024-10-20 | End: 2024-10-20

## 2024-10-20 RX ORDER — SODIUM CHLORIDE, SODIUM LACTATE, POTASSIUM CHLORIDE, CALCIUM CHLORIDE 600; 310; 30; 20 MG/100ML; MG/100ML; MG/100ML; MG/100ML
75 INJECTION, SOLUTION INTRAVENOUS CONTINUOUS
Status: CANCELLED | OUTPATIENT
Start: 2024-10-20 | End: 2024-10-21

## 2024-10-21 ENCOUNTER — HOSPITAL ENCOUNTER (INPATIENT)
Facility: HOSPITAL | Age: 69
LOS: 2 days | Discharge: HOME | DRG: 331 | End: 2024-10-23
Attending: STUDENT IN AN ORGANIZED HEALTH CARE EDUCATION/TRAINING PROGRAM | Admitting: STUDENT IN AN ORGANIZED HEALTH CARE EDUCATION/TRAINING PROGRAM
Payer: COMMERCIAL

## 2024-10-21 ENCOUNTER — ANESTHESIA (OUTPATIENT)
Dept: OPERATING ROOM | Facility: HOSPITAL | Age: 69
End: 2024-10-21
Payer: COMMERCIAL

## 2024-10-21 DIAGNOSIS — Z43.2 ATTENTION TO ILEOSTOMY: Primary | ICD-10-CM

## 2024-10-21 PROCEDURE — 2500000004 HC RX 250 GENERAL PHARMACY W/ HCPCS (ALT 636 FOR OP/ED): Mod: JZ | Performed by: STUDENT IN AN ORGANIZED HEALTH CARE EDUCATION/TRAINING PROGRAM

## 2024-10-21 PROCEDURE — 2500000005 HC RX 250 GENERAL PHARMACY W/O HCPCS: Performed by: STUDENT IN AN ORGANIZED HEALTH CARE EDUCATION/TRAINING PROGRAM

## 2024-10-21 PROCEDURE — 7100000001 HC RECOVERY ROOM TIME - INITIAL BASE CHARGE: Performed by: STUDENT IN AN ORGANIZED HEALTH CARE EDUCATION/TRAINING PROGRAM

## 2024-10-21 PROCEDURE — 3600000008 HC OR TIME - EACH INCREMENTAL 1 MINUTE - PROCEDURE LEVEL THREE: Performed by: STUDENT IN AN ORGANIZED HEALTH CARE EDUCATION/TRAINING PROGRAM

## 2024-10-21 PROCEDURE — A44620 PR CLOSE ENTEROSTOMY: Performed by: ANESTHESIOLOGY

## 2024-10-21 PROCEDURE — 2500000001 HC RX 250 WO HCPCS SELF ADMINISTERED DRUGS (ALT 637 FOR MEDICARE OP): Performed by: STUDENT IN AN ORGANIZED HEALTH CARE EDUCATION/TRAINING PROGRAM

## 2024-10-21 PROCEDURE — 3700000002 HC GENERAL ANESTHESIA TIME - EACH INCREMENTAL 1 MINUTE: Performed by: STUDENT IN AN ORGANIZED HEALTH CARE EDUCATION/TRAINING PROGRAM

## 2024-10-21 PROCEDURE — 1100000001 HC PRIVATE ROOM DAILY

## 2024-10-21 PROCEDURE — A44620 PR CLOSE ENTEROSTOMY: Performed by: ANESTHESIOLOGIST ASSISTANT

## 2024-10-21 PROCEDURE — 2500000004 HC RX 250 GENERAL PHARMACY W/ HCPCS (ALT 636 FOR OP/ED): Performed by: ANESTHESIOLOGY

## 2024-10-21 PROCEDURE — 44620 REPAIR BOWEL OPENING: CPT | Performed by: STUDENT IN AN ORGANIZED HEALTH CARE EDUCATION/TRAINING PROGRAM

## 2024-10-21 PROCEDURE — 2720000007 HC OR 272 NO HCPCS: Performed by: STUDENT IN AN ORGANIZED HEALTH CARE EDUCATION/TRAINING PROGRAM

## 2024-10-21 PROCEDURE — 2500000002 HC RX 250 W HCPCS SELF ADMINISTERED DRUGS (ALT 637 FOR MEDICARE OP, ALT 636 FOR OP/ED): Performed by: STUDENT IN AN ORGANIZED HEALTH CARE EDUCATION/TRAINING PROGRAM

## 2024-10-21 PROCEDURE — 0DBB0ZZ EXCISION OF ILEUM, OPEN APPROACH: ICD-10-PCS | Performed by: STUDENT IN AN ORGANIZED HEALTH CARE EDUCATION/TRAINING PROGRAM

## 2024-10-21 PROCEDURE — 7100000002 HC RECOVERY ROOM TIME - EACH INCREMENTAL 1 MINUTE: Performed by: STUDENT IN AN ORGANIZED HEALTH CARE EDUCATION/TRAINING PROGRAM

## 2024-10-21 PROCEDURE — 2500000004 HC RX 250 GENERAL PHARMACY W/ HCPCS (ALT 636 FOR OP/ED): Performed by: ANESTHESIOLOGIST ASSISTANT

## 2024-10-21 PROCEDURE — 3600000003 HC OR TIME - INITIAL BASE CHARGE - PROCEDURE LEVEL THREE: Performed by: STUDENT IN AN ORGANIZED HEALTH CARE EDUCATION/TRAINING PROGRAM

## 2024-10-21 PROCEDURE — 3700000001 HC GENERAL ANESTHESIA TIME - INITIAL BASE CHARGE: Performed by: STUDENT IN AN ORGANIZED HEALTH CARE EDUCATION/TRAINING PROGRAM

## 2024-10-21 PROCEDURE — 2500000005 HC RX 250 GENERAL PHARMACY W/O HCPCS: Performed by: ANESTHESIOLOGIST ASSISTANT

## 2024-10-21 PROCEDURE — 2500000004 HC RX 250 GENERAL PHARMACY W/ HCPCS (ALT 636 FOR OP/ED): Performed by: STUDENT IN AN ORGANIZED HEALTH CARE EDUCATION/TRAINING PROGRAM

## 2024-10-21 PROCEDURE — 88304 TISSUE EXAM BY PATHOLOGIST: CPT | Mod: TC,STJLAB | Performed by: STUDENT IN AN ORGANIZED HEALTH CARE EDUCATION/TRAINING PROGRAM

## 2024-10-21 RX ORDER — HYDRALAZINE HYDROCHLORIDE 20 MG/ML
5 INJECTION INTRAMUSCULAR; INTRAVENOUS EVERY 30 MIN PRN
Status: DISCONTINUED | OUTPATIENT
Start: 2024-10-21 | End: 2024-10-21 | Stop reason: HOSPADM

## 2024-10-21 RX ORDER — ACETAMINOPHEN 325 MG/1
975 TABLET ORAL ONCE
Status: CANCELLED | OUTPATIENT
Start: 2024-10-21 | End: 2024-10-21

## 2024-10-21 RX ORDER — PROPOFOL 10 MG/ML
INJECTION, EMULSION INTRAVENOUS AS NEEDED
Status: DISCONTINUED | OUTPATIENT
Start: 2024-10-21 | End: 2024-10-21

## 2024-10-21 RX ORDER — OXYCODONE HYDROCHLORIDE 5 MG/1
5 TABLET ORAL EVERY 4 HOURS PRN
Status: DISCONTINUED | OUTPATIENT
Start: 2024-10-21 | End: 2024-10-21 | Stop reason: HOSPADM

## 2024-10-21 RX ORDER — OXYCODONE HYDROCHLORIDE 5 MG/1
5 TABLET ORAL EVERY 4 HOURS PRN
Status: DISCONTINUED | OUTPATIENT
Start: 2024-10-21 | End: 2024-10-23 | Stop reason: HOSPADM

## 2024-10-21 RX ORDER — LABETALOL HYDROCHLORIDE 5 MG/ML
INJECTION, SOLUTION INTRAVENOUS AS NEEDED
Status: DISCONTINUED | OUTPATIENT
Start: 2024-10-21 | End: 2024-10-21

## 2024-10-21 RX ORDER — METRONIDAZOLE 500 MG/100ML
500 INJECTION, SOLUTION INTRAVENOUS ONCE
Status: COMPLETED | OUTPATIENT
Start: 2024-10-21 | End: 2024-10-21

## 2024-10-21 RX ORDER — METOPROLOL SUCCINATE 25 MG/1
25 TABLET, EXTENDED RELEASE ORAL DAILY
Status: DISCONTINUED | OUTPATIENT
Start: 2024-10-22 | End: 2024-10-23 | Stop reason: HOSPADM

## 2024-10-21 RX ORDER — ROSUVASTATIN CALCIUM 5 MG/1
5 TABLET, COATED ORAL NIGHTLY
Status: DISCONTINUED | OUTPATIENT
Start: 2024-10-21 | End: 2024-10-23 | Stop reason: HOSPADM

## 2024-10-21 RX ORDER — ONDANSETRON HYDROCHLORIDE 2 MG/ML
4 INJECTION, SOLUTION INTRAVENOUS ONCE AS NEEDED
Status: DISCONTINUED | OUTPATIENT
Start: 2024-10-21 | End: 2024-10-21 | Stop reason: HOSPADM

## 2024-10-21 RX ORDER — OXYCODONE AND ACETAMINOPHEN 5; 325 MG/1; MG/1
1 TABLET ORAL EVERY 4 HOURS PRN
Status: DISCONTINUED | OUTPATIENT
Start: 2024-10-21 | End: 2024-10-21 | Stop reason: HOSPADM

## 2024-10-21 RX ORDER — PANTOPRAZOLE SODIUM 40 MG/1
40 TABLET, DELAYED RELEASE ORAL
Status: DISCONTINUED | OUTPATIENT
Start: 2024-10-22 | End: 2024-10-23 | Stop reason: HOSPADM

## 2024-10-21 RX ORDER — SODIUM CHLORIDE, SODIUM LACTATE, POTASSIUM CHLORIDE, CALCIUM CHLORIDE 600; 310; 30; 20 MG/100ML; MG/100ML; MG/100ML; MG/100ML
100 INJECTION, SOLUTION INTRAVENOUS CONTINUOUS
Status: DISCONTINUED | OUTPATIENT
Start: 2024-10-21 | End: 2024-10-21 | Stop reason: HOSPADM

## 2024-10-21 RX ORDER — NAPROXEN SODIUM 220 MG/1
81 TABLET, FILM COATED ORAL DAILY
Status: DISCONTINUED | OUTPATIENT
Start: 2024-10-21 | End: 2024-10-23 | Stop reason: HOSPADM

## 2024-10-21 RX ORDER — ONDANSETRON HYDROCHLORIDE 2 MG/ML
INJECTION, SOLUTION INTRAVENOUS AS NEEDED
Status: DISCONTINUED | OUTPATIENT
Start: 2024-10-21 | End: 2024-10-21

## 2024-10-21 RX ORDER — ROCURONIUM BROMIDE 50 MG/5 ML
SYRINGE (ML) INTRAVENOUS AS NEEDED
Status: DISCONTINUED | OUTPATIENT
Start: 2024-10-21 | End: 2024-10-21

## 2024-10-21 RX ORDER — ACETAMINOPHEN 325 MG/1
975 TABLET ORAL EVERY 6 HOURS SCHEDULED
Status: DISCONTINUED | OUTPATIENT
Start: 2024-10-21 | End: 2024-10-23 | Stop reason: HOSPADM

## 2024-10-21 RX ORDER — ALBUTEROL SULFATE 0.83 MG/ML
2.5 SOLUTION RESPIRATORY (INHALATION) ONCE AS NEEDED
Status: DISCONTINUED | OUTPATIENT
Start: 2024-10-21 | End: 2024-10-21 | Stop reason: HOSPADM

## 2024-10-21 RX ORDER — FENTANYL CITRATE 50 UG/ML
INJECTION, SOLUTION INTRAMUSCULAR; INTRAVENOUS AS NEEDED
Status: DISCONTINUED | OUTPATIENT
Start: 2024-10-21 | End: 2024-10-21

## 2024-10-21 RX ORDER — ONDANSETRON HYDROCHLORIDE 2 MG/ML
4 INJECTION, SOLUTION INTRAVENOUS EVERY 8 HOURS PRN
Status: DISCONTINUED | OUTPATIENT
Start: 2024-10-21 | End: 2024-10-23 | Stop reason: HOSPADM

## 2024-10-21 RX ORDER — GABAPENTIN 100 MG/1
100 CAPSULE ORAL 3 TIMES DAILY
Status: DISCONTINUED | OUTPATIENT
Start: 2024-10-21 | End: 2024-10-23 | Stop reason: HOSPADM

## 2024-10-21 RX ORDER — METHOCARBAMOL 500 MG/1
500 TABLET, FILM COATED ORAL EVERY 8 HOURS PRN
Status: DISCONTINUED | OUTPATIENT
Start: 2024-10-21 | End: 2024-10-23 | Stop reason: HOSPADM

## 2024-10-21 RX ORDER — LISINOPRIL 10 MG/1
10 TABLET ORAL DAILY
Status: DISCONTINUED | OUTPATIENT
Start: 2024-10-21 | End: 2024-10-23 | Stop reason: HOSPADM

## 2024-10-21 RX ORDER — NALOXONE HYDROCHLORIDE 0.4 MG/ML
0.2 INJECTION, SOLUTION INTRAMUSCULAR; INTRAVENOUS; SUBCUTANEOUS EVERY 5 MIN PRN
Status: DISCONTINUED | OUTPATIENT
Start: 2024-10-21 | End: 2024-10-23 | Stop reason: HOSPADM

## 2024-10-21 RX ORDER — ONDANSETRON 4 MG/1
4 TABLET, ORALLY DISINTEGRATING ORAL EVERY 8 HOURS PRN
Status: DISCONTINUED | OUTPATIENT
Start: 2024-10-21 | End: 2024-10-23 | Stop reason: HOSPADM

## 2024-10-21 RX ORDER — MIDAZOLAM HYDROCHLORIDE 1 MG/ML
INJECTION, SOLUTION INTRAMUSCULAR; INTRAVENOUS AS NEEDED
Status: DISCONTINUED | OUTPATIENT
Start: 2024-10-21 | End: 2024-10-21

## 2024-10-21 RX ORDER — CEFAZOLIN SODIUM 2 G/100ML
2 INJECTION, SOLUTION INTRAVENOUS ONCE
Status: COMPLETED | OUTPATIENT
Start: 2024-10-21 | End: 2024-10-21

## 2024-10-21 RX ORDER — LIDOCAINE HYDROCHLORIDE 10 MG/ML
0.1 INJECTION, SOLUTION INFILTRATION; PERINEURAL ONCE
Status: DISCONTINUED | OUTPATIENT
Start: 2024-10-21 | End: 2024-10-21 | Stop reason: HOSPADM

## 2024-10-21 RX ORDER — LIDOCAINE HYDROCHLORIDE 20 MG/ML
INJECTION, SOLUTION EPIDURAL; INFILTRATION; INTRACAUDAL; PERINEURAL AS NEEDED
Status: DISCONTINUED | OUTPATIENT
Start: 2024-10-21 | End: 2024-10-21

## 2024-10-21 RX ORDER — POVIDONE-IODINE 7.5 MG/ML
SOLUTION TOPICAL AS NEEDED
Status: DISCONTINUED | OUTPATIENT
Start: 2024-10-21 | End: 2024-10-21 | Stop reason: HOSPADM

## 2024-10-21 RX ORDER — DIPHENHYDRAMINE HYDROCHLORIDE 50 MG/ML
12.5 INJECTION INTRAMUSCULAR; INTRAVENOUS ONCE AS NEEDED
Status: DISCONTINUED | OUTPATIENT
Start: 2024-10-21 | End: 2024-10-21 | Stop reason: HOSPADM

## 2024-10-21 RX ORDER — SODIUM CHLORIDE, SODIUM LACTATE, POTASSIUM CHLORIDE, CALCIUM CHLORIDE 600; 310; 30; 20 MG/100ML; MG/100ML; MG/100ML; MG/100ML
75 INJECTION, SOLUTION INTRAVENOUS CONTINUOUS
Status: DISCONTINUED | OUTPATIENT
Start: 2024-10-21 | End: 2024-10-21

## 2024-10-21 RX ORDER — LABETALOL HYDROCHLORIDE 5 MG/ML
5 INJECTION, SOLUTION INTRAVENOUS ONCE AS NEEDED
Status: DISCONTINUED | OUTPATIENT
Start: 2024-10-21 | End: 2024-10-21 | Stop reason: HOSPADM

## 2024-10-21 RX ORDER — SODIUM CHLORIDE, SODIUM LACTATE, POTASSIUM CHLORIDE, CALCIUM CHLORIDE 600; 310; 30; 20 MG/100ML; MG/100ML; MG/100ML; MG/100ML
40 INJECTION, SOLUTION INTRAVENOUS CONTINUOUS
Status: DISCONTINUED | OUTPATIENT
Start: 2024-10-21 | End: 2024-10-22

## 2024-10-21 RX ORDER — HYDROMORPHONE HYDROCHLORIDE 0.2 MG/ML
0.2 INJECTION INTRAMUSCULAR; INTRAVENOUS; SUBCUTANEOUS EVERY 5 MIN PRN
Status: DISCONTINUED | OUTPATIENT
Start: 2024-10-21 | End: 2024-10-21 | Stop reason: HOSPADM

## 2024-10-21 RX ORDER — ENOXAPARIN SODIUM 100 MG/ML
40 INJECTION SUBCUTANEOUS EVERY 24 HOURS
Status: DISCONTINUED | OUTPATIENT
Start: 2024-10-22 | End: 2024-10-23 | Stop reason: HOSPADM

## 2024-10-21 RX ORDER — OXYCODONE HYDROCHLORIDE 10 MG/1
10 TABLET ORAL EVERY 4 HOURS PRN
Status: DISCONTINUED | OUTPATIENT
Start: 2024-10-21 | End: 2024-10-23 | Stop reason: HOSPADM

## 2024-10-21 RX ORDER — HYDROMORPHONE HYDROCHLORIDE 1 MG/ML
INJECTION, SOLUTION INTRAMUSCULAR; INTRAVENOUS; SUBCUTANEOUS AS NEEDED
Status: DISCONTINUED | OUTPATIENT
Start: 2024-10-21 | End: 2024-10-21

## 2024-10-21 RX ORDER — BUPIVACAINE HCL/EPINEPHRINE 0.5-1:200K
VIAL (ML) INJECTION AS NEEDED
Status: DISCONTINUED | OUTPATIENT
Start: 2024-10-21 | End: 2024-10-21 | Stop reason: HOSPADM

## 2024-10-21 SDOH — SOCIAL STABILITY: SOCIAL INSECURITY: HAVE YOU HAD THOUGHTS OF HARMING ANYONE ELSE?: NO

## 2024-10-21 SDOH — SOCIAL STABILITY: SOCIAL INSECURITY: DO YOU FEEL ANYONE HAS EXPLOITED OR TAKEN ADVANTAGE OF YOU FINANCIALLY OR OF YOUR PERSONAL PROPERTY?: NO

## 2024-10-21 SDOH — SOCIAL STABILITY: SOCIAL INSECURITY
WITHIN THE LAST YEAR, HAVE YOU BEEN KICKED, HIT, SLAPPED, OR OTHERWISE PHYSICALLY HURT BY YOUR PARTNER OR EX-PARTNER?: NO

## 2024-10-21 SDOH — SOCIAL STABILITY: SOCIAL INSECURITY
WITHIN THE LAST YEAR, HAVE YOU BEEN RAPED OR FORCED TO HAVE ANY KIND OF SEXUAL ACTIVITY BY YOUR PARTNER OR EX-PARTNER?: NO

## 2024-10-21 SDOH — SOCIAL STABILITY: SOCIAL INSECURITY: WITHIN THE LAST YEAR, HAVE YOU BEEN AFRAID OF YOUR PARTNER OR EX-PARTNER?: NO

## 2024-10-21 SDOH — ECONOMIC STABILITY: INCOME INSECURITY: IN THE PAST 12 MONTHS HAS THE ELECTRIC, GAS, OIL, OR WATER COMPANY THREATENED TO SHUT OFF SERVICES IN YOUR HOME?: NO

## 2024-10-21 SDOH — ECONOMIC STABILITY: FOOD INSECURITY: WITHIN THE PAST 12 MONTHS, THE FOOD YOU BOUGHT JUST DIDN'T LAST AND YOU DIDN'T HAVE MONEY TO GET MORE.: NEVER TRUE

## 2024-10-21 SDOH — SOCIAL STABILITY: SOCIAL INSECURITY: HAVE YOU HAD ANY THOUGHTS OF HARMING ANYONE ELSE?: NO

## 2024-10-21 SDOH — ECONOMIC STABILITY: HOUSING INSECURITY: AT ANY TIME IN THE PAST 12 MONTHS, WERE YOU HOMELESS OR LIVING IN A SHELTER (INCLUDING NOW)?: NO

## 2024-10-21 SDOH — SOCIAL STABILITY: SOCIAL INSECURITY: ARE THERE ANY APPARENT SIGNS OF INJURIES/BEHAVIORS THAT COULD BE RELATED TO ABUSE/NEGLECT?: NO

## 2024-10-21 SDOH — ECONOMIC STABILITY: HOUSING INSECURITY: IN THE LAST 12 MONTHS, WAS THERE A TIME WHEN YOU WERE NOT ABLE TO PAY THE MORTGAGE OR RENT ON TIME?: NO

## 2024-10-21 SDOH — SOCIAL STABILITY: SOCIAL INSECURITY: WERE YOU ABLE TO COMPLETE ALL THE BEHAVIORAL HEALTH SCREENINGS?: YES

## 2024-10-21 SDOH — SOCIAL STABILITY: SOCIAL INSECURITY: DOES ANYONE TRY TO KEEP YOU FROM HAVING/CONTACTING OTHER FRIENDS OR DOING THINGS OUTSIDE YOUR HOME?: NO

## 2024-10-21 SDOH — HEALTH STABILITY: MENTAL HEALTH: CURRENT SMOKER: 0

## 2024-10-21 SDOH — SOCIAL STABILITY: SOCIAL INSECURITY: WITHIN THE LAST YEAR, HAVE YOU BEEN HUMILIATED OR EMOTIONALLY ABUSED IN OTHER WAYS BY YOUR PARTNER OR EX-PARTNER?: NO

## 2024-10-21 SDOH — ECONOMIC STABILITY: FOOD INSECURITY: WITHIN THE PAST 12 MONTHS, YOU WORRIED THAT YOUR FOOD WOULD RUN OUT BEFORE YOU GOT THE MONEY TO BUY MORE.: NEVER TRUE

## 2024-10-21 SDOH — SOCIAL STABILITY: SOCIAL INSECURITY: ABUSE: ADULT

## 2024-10-21 SDOH — ECONOMIC STABILITY: HOUSING INSECURITY: IN THE PAST 12 MONTHS, HOW MANY TIMES HAVE YOU MOVED WHERE YOU WERE LIVING?: 1

## 2024-10-21 SDOH — SOCIAL STABILITY: SOCIAL INSECURITY: ARE YOU OR HAVE YOU BEEN THREATENED OR ABUSED PHYSICALLY, EMOTIONALLY, OR SEXUALLY BY ANYONE?: NO

## 2024-10-21 SDOH — SOCIAL STABILITY: SOCIAL INSECURITY: HAS ANYONE EVER THREATENED TO HURT YOUR FAMILY OR YOUR PETS?: NO

## 2024-10-21 SDOH — ECONOMIC STABILITY: FOOD INSECURITY: HOW HARD IS IT FOR YOU TO PAY FOR THE VERY BASICS LIKE FOOD, HOUSING, MEDICAL CARE, AND HEATING?: NOT HARD AT ALL

## 2024-10-21 SDOH — ECONOMIC STABILITY: TRANSPORTATION INSECURITY: IN THE PAST 12 MONTHS, HAS LACK OF TRANSPORTATION KEPT YOU FROM MEDICAL APPOINTMENTS OR FROM GETTING MEDICATIONS?: NO

## 2024-10-21 SDOH — SOCIAL STABILITY: SOCIAL INSECURITY: DO YOU FEEL UNSAFE GOING BACK TO THE PLACE WHERE YOU ARE LIVING?: NO

## 2024-10-21 ASSESSMENT — ACTIVITIES OF DAILY LIVING (ADL)
HEARING - LEFT EAR: FUNCTIONAL
DRESSING YOURSELF: INDEPENDENT
ASSISTIVE_DEVICE: EYEGLASSES;DENTURES PARTIAL
FEEDING YOURSELF: INDEPENDENT
PATIENT'S MEMORY ADEQUATE TO SAFELY COMPLETE DAILY ACTIVITIES?: YES
WALKS IN HOME: INDEPENDENT
LACK_OF_TRANSPORTATION: NO
TOILETING: INDEPENDENT
BATHING: INDEPENDENT
HEARING - RIGHT EAR: FUNCTIONAL
GROOMING: INDEPENDENT
ADEQUATE_TO_COMPLETE_ADL: YES
LACK_OF_TRANSPORTATION: NO
JUDGMENT_ADEQUATE_SAFELY_COMPLETE_DAILY_ACTIVITIES: YES

## 2024-10-21 ASSESSMENT — COGNITIVE AND FUNCTIONAL STATUS - GENERAL
MOBILITY SCORE: 24
DAILY ACTIVITIY SCORE: 24
MOBILITY SCORE: 24
PATIENT BASELINE BEDBOUND: NO
DAILY ACTIVITIY SCORE: 24

## 2024-10-21 ASSESSMENT — PAIN SCALES - GENERAL
PAINLEVEL_OUTOF10: 0 - NO PAIN
PAINLEVEL_OUTOF10: 0 - NO PAIN
PAINLEVEL_OUTOF10: 5 - MODERATE PAIN
PAINLEVEL_OUTOF10: 0 - NO PAIN
PAINLEVEL_OUTOF10: 5 - MODERATE PAIN
PAINLEVEL_OUTOF10: 6
PAINLEVEL_OUTOF10: 0 - NO PAIN
PAINLEVEL_OUTOF10: 6
PAINLEVEL_OUTOF10: 5 - MODERATE PAIN

## 2024-10-21 ASSESSMENT — LIFESTYLE VARIABLES
SKIP TO QUESTIONS 9-10: 1
HOW MANY STANDARD DRINKS CONTAINING ALCOHOL DO YOU HAVE ON A TYPICAL DAY: PATIENT DOES NOT DRINK
HOW OFTEN DO YOU HAVE A DRINK CONTAINING ALCOHOL: NEVER
AUDIT-C TOTAL SCORE: 0
HOW OFTEN DO YOU HAVE 6 OR MORE DRINKS ON ONE OCCASION: NEVER
AUDIT-C TOTAL SCORE: 0

## 2024-10-21 ASSESSMENT — PAIN - FUNCTIONAL ASSESSMENT
PAIN_FUNCTIONAL_ASSESSMENT: 0-10

## 2024-10-21 ASSESSMENT — PAIN DESCRIPTION - LOCATION: LOCATION: ABDOMEN

## 2024-10-21 ASSESSMENT — PATIENT HEALTH QUESTIONNAIRE - PHQ9
2. FEELING DOWN, DEPRESSED OR HOPELESS: NOT AT ALL
1. LITTLE INTEREST OR PLEASURE IN DOING THINGS: NOT AT ALL
SUM OF ALL RESPONSES TO PHQ9 QUESTIONS 1 & 2: 0

## 2024-10-21 ASSESSMENT — PAIN DESCRIPTION - DESCRIPTORS
DESCRIPTORS: SORE;TENDER
DESCRIPTORS: SORE;TENDER

## 2024-10-21 NOTE — ANESTHESIA PROCEDURE NOTES
Airway  Date/Time: 10/21/2024 7:37 AM  Urgency: elective    Airway not difficult    Staffing  Performed: RYAN   Authorized by: Aureliano Osborn MD    Performed by: RYAN Espinoza  Patient location during procedure: OR    Indications and Patient Condition  Indications for airway management: anesthesia  Spontaneous Ventilation: absent  Sedation level: deep  Preoxygenated: yes  Patient position: sniffing  Mask difficulty assessment: 1 - vent by mask    Final Airway Details  Final airway type: endotracheal airway      Successful airway: ETT  Cuffed: yes   Successful intubation technique: direct laryngoscopy  Facilitating devices/methods: intubating stylet  Endotracheal tube insertion site: oral  Blade: Trupti  Blade size: #3  ETT size (mm): 7.0  Cormack-Lehane Classification: grade I - full view of glottis  Placement verified by: chest auscultation and capnometry   Measured from: lips  ETT to lips (cm): 21  Number of attempts at approach: 1

## 2024-10-21 NOTE — INTERVAL H&P NOTE
H&P reviewed. The patient was examined and there are no changes to the H&P.    Plan for ileostomy reversal today.

## 2024-10-21 NOTE — ANESTHESIA PREPROCEDURE EVALUATION
"Patient: Janis Ibanez \"Marquita\"    Procedure Information       Date/Time: 10/21/24 0730    Procedure: Ileostomy closure, possible open    Location: [facility] / [facility]    Surgeons: Gustavo Harp MD            Relevant Problems   Cardiac   (+) MI (myocardial infarction) (Multi)   (+) Mixed hyperlipidemia      GI   (+) Cancer of sigmoid colon (Multi)   (+) Malignant neoplasm of rectal ampulla (Multi)   (+) Rectal cancer (Multi)      Liver   (+) Cancer of sigmoid colon (Multi)   (+) Malignant neoplasm of rectal ampulla (Multi)   (+) Rectal cancer (Multi)      GYN   (+) Malignant neoplasm of female breast       Clinical information reviewed:   Tobacco  Allergies  Meds   Med Hx  Surg Hx  OB Status  Fam Hx  Soc   Hx        NPO Detail:  No data recorded     Physical Exam    Airway  Mallampati: II  TM distance: >3 FB     Cardiovascular   Rhythm: regular     Dental - normal exam     Pulmonary   Breath sounds clear to auscultation     Abdominal            Anesthesia Plan    History of general anesthesia?: yes  History of complications of general anesthesia?: unknown/emergency    ASA 2     spinal     The patient is not a current smoker.    intravenous induction   Postoperative administration of opioids is intended.  Anesthetic plan and risks discussed with patient.    Plan discussed with CAA.      "

## 2024-10-21 NOTE — OP NOTE
"Ileostomy closure, possible open Operative Note     Date: 10/21/2024  OR Location: [facility]    Name: Janis Ibanez \"Marquita\", : 1955, Age: 69 y.o., MRN: [id number], Sex: female    Diagnosis  Pre-op Diagnosis      * Attention to ileostomy [Z43.2] Post-op Diagnosis     * Attention to ileostomy [Z43.2]     Procedures  Closure of loop ileostomy  Side-to-side stapled enteroenterostomy    Surgeons      * Gustavo Harp - Primary    Resident/Fellow/Other Assistant:     * Nataliia Modi MD - Fellow    Procedure Summary  Anesthesia: Spinal  ASA: II  Anesthesia Staff: Anesthesiologist: Aureliano Osborn MD  C-AA: RYAN Espinoza  Estimated Blood Loss: 50mL  Intra-op Medications:   Administrations occurring from 0730 to 1005 on 10/21/24:   Medication Name Total Dose   povidone-iodine (Betadine) 7.5 % soap 1 Application   BUPivacaine-EPINEPHrine (Marcaine w/EPI) 0.5 %-1:200,000 injection 30 mL   ceFAZolin (Ancef) 2 g in dextrose (iso)  mL 2 g   dexAMETHasone (Decadron) injection 4 mg/mL 4 mg   fentaNYL (Sublimaze) injection 50 mcg/mL 100 mcg   HYDROmorphone (Dilaudid) injection 1 mg/mL 1 mg   HYDROmorphone PF (Dilaudid) injection 0.2 mg 0.2 mg   labetalol (Normodyne,Trandate) injection 20 mg   LR bolus Cannot be calculated   lidocaine PF (Xylocaine-MPF) local injection 2 % 100 mg   ondansetron (Zofran) 2 mg/mL injection 4 mg   propofol (Diprivan) injection 10 mg/mL 200 mg   rocuronium (Zemuron) 50 mg/5 mL prefilled syringe 70 mg   sugammadex (Bridion) 200 mg/2 mL injection 200 mg              Anesthesia Record               Intraprocedure I/O Totals          Intake    LR bolus 600.00 mL    Total Intake 600 mL          Specimen:   ID Type Source Tests Collected by Time   1 : ILEOSTOMY Tissue COLOSTOMY (STOMA) SURGICAL PATHOLOGY EXAM Gustavo Harp MD 10/21/2024 0852        Staff:   Circulator: Hanna Dawsonub Person: [place]         Drains and/or Catheters: * None in log *    Tourniquet Times:     "     Implants:     Findings: Extensive peristomal adhesions.    Indications: Marquita Ibanez is an 69 y.o. female who is having surgery for Attention to ileostomy (Multi) [Z43.2].     The patient was seen in the preoperative area. The risks, benefits, complications, treatment options, non-operative alternatives, expected recovery and outcomes were discussed with the patient. The possibilities of reaction to medication, pulmonary aspiration, injury to surrounding structures, bleeding, recurrent infection, the need for additional procedures, failure to diagnose a condition, and creating a complication requiring transfusion or operation were discussed with the patient. The patient concurred with the proposed plan, giving informed consent.  The site of surgery was properly noted/marked if necessary per policy. The patient has been actively warmed in preoperative area. Preoperative antibiotics have been ordered and given within 1 hours of incision. Venous thrombosis prophylaxis have been ordered including bilateral sequential compression devices    Procedure Details: Safety checklist performed in preoperative area confirming correct patient and correct procedure.  Patient was brought to the operating room.  Sequential compression devices were applied to bilateral lower extremities.  Following induction of general anesthesia, the patient was placed in supine position with bilateral arms abducted and gently fixed to arm boards.  The stoma appliance was removed and the peristomal skin cleansed.  The abdomen was prepped and the patient was draped in the usual sterile fashion.  Time out was performed, once again confirming correct patient and correct procedure.       Following completion of perioperative antibiotics, electrocautery was used to incise the skin close to the mucocutaneous border around the entire loop ileostomy aperture.  Electrocautery was used to deepen the incision into the subcutaneous tissue.  The entire  circumference of the diverting loop ileostomy was then dissected from the surrounding subcutaneous tissue below the level of the abdominal fascia using a combination of blunt, Metzenbaum, and electrocautery dissection.  The ileostomy was significantly adhesed to the surrounding abdominal wall soft tissues and the underside of the fascia circumferentially.  Due to the patient's body habitus, the skin at the superior, inferior and laterals aspects of the stoma aperture were incised to facilitate visualization of the fascial edges.  Once the ileostomy was freed, it was closely evaluated.  Decision was made to proceed with a stapled side-to-side enteroenterostomy for reversal.      Transection points of the ileum immediately proximal and distal to the ileostomy were identified.  Small windows were created at the mesenteric border at these sites using electrocautery.  The mesentery between the proximal and distal transection points was then divided and ligated using 0-vicryl ties.  The limbs of the small bowel were aligned.  Enterotomies were created on the specimen sides of the ileum for introduction of the stapler into the bowel lumen.  The linear 75mm blue load stapler was used to create a side-to-side stapled anastomosis between the limbs.  The stapler was opened and removed.  The anastomotic staple line was examined and there was good hemostasis.  The common enterotomy was closed with the Tx60mm blue load stapler.  The ileostomy was passed off the table for final pathology.  The common enterotomy staple line was reinforced with a running 2-0 vicryl stitch for hemostasis.  A simple interrupted 3-0 vicryl stitch was placed at the crotch for staple line reinforcement.  The anastomosis was returned to the abdomen.      A preliminary count was performed and confirmed to be correct.  The fascia was closed with multiple #1 non-looped PDS stitches.  Care was taken to ensure no intra-abdominal viscera was brought into the  closure.  The stoma site was irrigated and dried.  The stoma aperture was then cinched with a deep dermal 2-0 vicryl purse-string stitch.  Final count was performed and confirmed to be correct.  A strip of betadine-soaked telfa was used to pack the small remaining aperture of the stoma site.  The abdomen was cleansed and dried.  Dry dressings were applied.  Sign out was performed.  Patient was awakened and taken to the recovery area in stable condition.     Complications:  None; patient tolerated the procedure well.    Disposition: PACU - hemodynamically stable.  Condition: stable         Additional Details: N/A    Attending Attestation: I was present and scrubbed for the entire procedure.    Gustavo Harp  Phone Number: [phone]

## 2024-10-21 NOTE — ANESTHESIA POSTPROCEDURE EVALUATION
"Patient: Janis Ibanez \"Marquita\"    Procedure Summary       Date: 10/21/24 Room / Location: [facility] / [facility]    Anesthesia Start: 0731 Anesthesia Stop: 0934    Procedure: Ileostomy closure, possible open Diagnosis:       Attention to ileostomy      (Attention to ileostomy (Multi) [Z43.2])    Surgeons: Gustavo Harp MD Responsible Provider: Aureliano Osborn MD    Anesthesia Type: spinal ASA Status: 2            Anesthesia Type: spinal    Vitals Value Taken Time   /70 10/21/24 0930   Temp 36.5 10/21/24 0935   Pulse 60 10/21/24 0934   Resp 16 10/21/24 0934   SpO2 96 % 10/21/24 0934   Vitals shown include unfiled device data.    Anesthesia Post Evaluation    Patient location during evaluation: PACU  Patient participation: complete - patient participated  Level of consciousness: awake and alert  Pain management: satisfactory to patient  Multimodal analgesia pain management approach  Airway patency: patent  Two or more strategies used to mitigate risk of obstructive sleep apnea  Cardiovascular status: acceptable  Respiratory status: acceptable  Hydration status: acceptable  Postoperative Nausea and Vomiting: none        No notable events documented.    "

## 2024-10-22 LAB
ANION GAP SERPL CALC-SCNC: 13 MMOL/L (ref 10–20)
BUN SERPL-MCNC: 17 MG/DL (ref 6–23)
CALCIUM SERPL-MCNC: 9.9 MG/DL (ref 8.6–10.3)
CHLORIDE SERPL-SCNC: 103 MMOL/L (ref 98–107)
CO2 SERPL-SCNC: 23 MMOL/L (ref 21–32)
CREAT SERPL-MCNC: 0.93 MG/DL (ref 0.5–1.05)
EGFRCR SERPLBLD CKD-EPI 2021: 68 ML/MIN/1.73M*2
ERYTHROCYTE [DISTWIDTH] IN BLOOD BY AUTOMATED COUNT: 13.4 % (ref 11.5–14.5)
GLUCOSE SERPL-MCNC: 106 MG/DL (ref 74–99)
HCT VFR BLD AUTO: 37.4 % (ref 36–46)
HGB BLD-MCNC: 12.2 G/DL (ref 12–16)
MAGNESIUM SERPL-MCNC: 2.09 MG/DL (ref 1.6–2.4)
MCH RBC QN AUTO: 28.8 PG (ref 26–34)
MCHC RBC AUTO-ENTMCNC: 32.6 G/DL (ref 32–36)
MCV RBC AUTO: 88 FL (ref 80–100)
NRBC BLD-RTO: 0 /100 WBCS (ref 0–0)
PHOSPHATE SERPL-MCNC: 2.8 MG/DL (ref 2.5–4.9)
PLATELET # BLD AUTO: 300 X10*3/UL (ref 150–450)
POTASSIUM SERPL-SCNC: 4.3 MMOL/L (ref 3.5–5.3)
RBC # BLD AUTO: 4.24 X10*6/UL (ref 4–5.2)
SODIUM SERPL-SCNC: 135 MMOL/L (ref 136–145)
WBC # BLD AUTO: 16.8 X10*3/UL (ref 4.4–11.3)

## 2024-10-22 PROCEDURE — 80048 BASIC METABOLIC PNL TOTAL CA: CPT | Performed by: STUDENT IN AN ORGANIZED HEALTH CARE EDUCATION/TRAINING PROGRAM

## 2024-10-22 PROCEDURE — 83735 ASSAY OF MAGNESIUM: CPT | Performed by: NURSE PRACTITIONER

## 2024-10-22 PROCEDURE — 36415 COLL VENOUS BLD VENIPUNCTURE: CPT | Performed by: STUDENT IN AN ORGANIZED HEALTH CARE EDUCATION/TRAINING PROGRAM

## 2024-10-22 PROCEDURE — 2500000004 HC RX 250 GENERAL PHARMACY W/ HCPCS (ALT 636 FOR OP/ED): Performed by: STUDENT IN AN ORGANIZED HEALTH CARE EDUCATION/TRAINING PROGRAM

## 2024-10-22 PROCEDURE — 85027 COMPLETE CBC AUTOMATED: CPT | Performed by: STUDENT IN AN ORGANIZED HEALTH CARE EDUCATION/TRAINING PROGRAM

## 2024-10-22 PROCEDURE — 2500000001 HC RX 250 WO HCPCS SELF ADMINISTERED DRUGS (ALT 637 FOR MEDICARE OP): Performed by: STUDENT IN AN ORGANIZED HEALTH CARE EDUCATION/TRAINING PROGRAM

## 2024-10-22 PROCEDURE — 1100000001 HC PRIVATE ROOM DAILY

## 2024-10-22 PROCEDURE — 99024 POSTOP FOLLOW-UP VISIT: CPT | Performed by: STUDENT IN AN ORGANIZED HEALTH CARE EDUCATION/TRAINING PROGRAM

## 2024-10-22 PROCEDURE — 84100 ASSAY OF PHOSPHORUS: CPT | Performed by: NURSE PRACTITIONER

## 2024-10-22 PROCEDURE — 2500000002 HC RX 250 W HCPCS SELF ADMINISTERED DRUGS (ALT 637 FOR MEDICARE OP, ALT 636 FOR OP/ED): Performed by: STUDENT IN AN ORGANIZED HEALTH CARE EDUCATION/TRAINING PROGRAM

## 2024-10-22 ASSESSMENT — COGNITIVE AND FUNCTIONAL STATUS - GENERAL
MOBILITY SCORE: 24
DAILY ACTIVITIY SCORE: 24

## 2024-10-22 ASSESSMENT — PAIN SCALES - GENERAL
PAINLEVEL_OUTOF10: 3
PAINLEVEL_OUTOF10: 0 - NO PAIN
PAINLEVEL_OUTOF10: 4

## 2024-10-22 ASSESSMENT — PAIN DESCRIPTION - LOCATION: LOCATION: ABDOMEN

## 2024-10-22 NOTE — NURSING NOTE
Shift note -   1200 - Pt stable this AM. She has been able to tolerate full liquid diet and started to pass some gas. Pt remains alert and able to make her needs known. Pt walking the halls independently and uses call light appropriately.     1700 - Pt continues to be independent to the bathroom. She has begun to have loose stools. Pt educated on barrier cream etc. She has been given supplies and helped with ADLs as needed. Pain is tolerable and she has been medicated per orders.

## 2024-10-22 NOTE — PROGRESS NOTES
Spiritual Care Visit    Clinical Encounter Type  Visited With: Patient  Routine Visit: Introduction  Continue Visiting: No                                            Taxonomy  Intended Effects: Promote sense of peace, Preserve dignity and respect, Meaning-making  Methods: Offer spiritual/Mormon support  Interventions: Share words of hope and inspiration, [place]    Patient remembered the  from the last time she was in the hospital.   listened to her story and celebrated her eric that she is getting better.  Patient talked about her home in [place] and her love of animals.   prayed at her request.

## 2024-10-22 NOTE — PROGRESS NOTES
10/22/24 1343   Discharge Planning   Living Arrangements Children   Support Systems Children   Assistance Needed None prior to admit   Type of Residence Private residence   Number of Stairs Within Residence   (No difficulty with steps.)   Home or Post Acute Services None   Expected Discharge Disposition Home   Does the patient need discharge transport arranged? No   Financial Resource Strain   How hard is it for you to pay for the very basics like food, housing, medical care, and heating? Not hard     Met with pt to review her dc plans. The pt lives with her son, daughter in law and their children. States she is independent, drives. Her PCP is in [place] where she used to live year round-- plans to winter there this year. Prescriptions are filled at [facility] with no barriers noted. The pt denies any difficulty with her living expenses such as utilities/groceries/prescriptions. The pt has no home going needs at this time. Has a ride upon dc.

## 2024-10-22 NOTE — PROGRESS NOTES
Janis Ibanez 69 y.o. female    Subjective  Patient seen and examined this morning.  Denies nausea and vomiting.  No fever or chills. Tolerating clear liquids.  Up ambulating.  Pain controlled.  Passing flatus, no BM as of yet. Urinating without difficulty.  Denies CP and SOB.  No acute events overnight.     Objective  PHYSICAL EXAM:  Physical Exam  Vitals reviewed.   Constitutional:       General: He is awake.      Appearance: Normal appearance.   Cardiovascular:      Rate and Rhythm: Normal rate and regular rhythm.      Pulses: Normal pulses.      Heart sounds: Normal heart sounds.   Pulmonary:      Effort: Pulmonary effort is normal.      Breath sounds: Normal breath sounds and air entry.   Abdominal:      General: Abdomen is flat. There is no distension.      Palpations: Abdomen is soft.      Tenderness: There is abdominal tenderness. There is no guarding or rebound.      Comments: Soft, non-distended.  Stoma closure with packing removed, well approximated.  New dressing applied.  Appropriately TTP    Musculoskeletal:         General: Normal range of motion.      Cervical back: Normal range of motion.   Skin:     General: Skin is warm and dry.   Neurological:      General: No focal deficit present.      Mental Status: He is alert and oriented to person, place, and time.   Psychiatric:         Behavior: Behavior is cooperative.         Vital signs in last 24 hours:  Vitals:    10/22/24 0830   BP: 154/79   Pulse: 60   Resp: 18   Temp: 36.9 °C (98.4 °F)   SpO2: 98%         Intake/Output this shift:    Intake/Output Summary (Last 24 hours) at 10/22/2024 0959  Last data filed at 10/21/2024 2136  Gross per 24 hour   Intake 1362 ml   Output 800 ml   Net 562 ml        Allergies:  No Known Allergies     Medications:  Scheduled medications  acetaminophen, 975 mg, oral, q6h ILDEFONSO  [Held by provider] aspirin, 81 mg, oral, Daily  enoxaparin, 40 mg, subcutaneous, q24h  gabapentin, 100 mg, oral, TID  [Held by provider]  lisinopril, 10 mg, oral, Daily  metoprolol succinate XL, 25 mg, oral, Daily  pantoprazole, 40 mg, oral, Daily before breakfast  rosuvastatin, 5 mg, oral, Nightly      Continuous medications     PRN medications  PRN medications: methocarbamol, naloxone, ondansetron ODT **OR** ondansetron, oxyCODONE, oxyCODONE, oxygen       Labs:  Results for orders placed or performed during the hospital encounter of 10/21/24 (from the past 24 hours)   Basic Metabolic Panel   Result Value Ref Range    Glucose 106 (H) 74 - 99 mg/dL    Sodium 135 (L) 136 - 145 mmol/L    Potassium 4.3 3.5 - 5.3 mmol/L    Chloride 103 98 - 107 mmol/L    Bicarbonate 23 21 - 32 mmol/L    Anion Gap 13 10 - 20 mmol/L    Urea Nitrogen 17 6 - 23 mg/dL    Creatinine 0.93 0.50 - 1.05 mg/dL    eGFR 68 >60 mL/min/1.73m*2    Calcium 9.9 8.6 - 10.3 mg/dL   CBC   Result Value Ref Range    WBC 16.8 (H) 4.4 - 11.3 x10*3/uL    nRBC 0.0 0.0 - 0.0 /100 WBCs    RBC 4.24 4.00 - 5.20 x10*6/uL    Hemoglobin 12.2 12.0 - 16.0 g/dL    Hematocrit 37.4 36.0 - 46.0 %    MCV 88 80 - 100 fL    MCH 28.8 26.0 - 34.0 pg    MCHC 32.6 32.0 - 36.0 g/dL    RDW 13.4 11.5 - 14.5 %    Platelets 300 150 - 450 x10*3/uL   Phosphorus   Result Value Ref Range    Phosphorus 2.8 2.5 - 4.9 mg/dL   Magnesium   Result Value Ref Range    Magnesium 2.09 1.60 - 2.40 mg/dL        Imaging:  Colonoscopy Screening; High Risk Patient    Result Date: 10/17/2024  Table formatting from the original result was not included. Impression Healthy end-to-end colorectal anastomosis in the rectum Pancolonic erythematous, petechial mucosa Findings Healthy end-to-end colorectal anastomosis in the rectum; no bleeding was observed Generalized pancolonic erythematous and petechial mucosa. Consistent with diversion colitis.  Recommendation Repeat colonoscopy in 1 year, due: 10/17/2025 Indication Rectal cancer (Multi) Staff Staff Role No Staff Documented Medications See Anesthesia Record. Preprocedure A history and physical has  been performed, and patient medication allergies have been reviewed. The patient's tolerance of previous anesthesia has been reviewed. The risks and benefits of the procedure and the sedation options and risks were discussed with the patient. All questions were answered and informed consent obtained. Details of the Procedure The patient underwent monitored anesthesia care, which was administered by an anesthesia professional. The patient's blood pressure, ECG, heart rate, level of consciousness, oxygen and respirations were monitored throughout the procedure. A digital rectal exam was performed. A perianal exam was performed. The scope was introduced through the anus and advanced to the cecum. Retroflexion was not performed due to altered anatomy. The quality of bowel preparation was evaluated using the [place] Bowel Preparation Scale with scores of: right colon = 1, transverse colon = 1, left colon = 1. The total BBPS score was 3. Bowel prep was not adequate. The patient experienced no blood loss. The procedure was not difficult. The patient tolerated the procedure well. There were no apparent adverse events. Events Procedure Events Event Event Time ENDO SCOPE IN TIME 10/17/2024  8:44 AM ENDO CECUM REACHED 10/17/2024  8:48 AM ENDO SCOPE OUT TIME 10/17/2024  9:00 AM Specimens No specimens collected Procedure Location [facility] [address] [facility] [id number] [phone] Referring Provider Amanda Junior MD Procedure Provider Amanda Junior MD     FL enema single contrast water soluble    Result Date: 9/23/2024  Interpreted By:  Angelito Shaw, STUDY: FL ENEMA SINGLE CONTRAST WATER SOLUBLE;  9/23/2024 10:46 am   INDICATION: Signs/Symptoms:Ileostomy reversal.   ,C20 Malignant neoplasm of rectum (Multi)   COMPARISON: None.   ACCESSION NUMBER(S): [id number]   ORDERING CLINICIAN: AMANDA JUNIOR   TECHNIQUE: Pre-procedure  KUB was obtained and evaluated.   The rectal tube  was inserted. Rectal  Omnipaque 350 was then administered under fluoroscopic observation; multiple spot images were obtained.   FINDINGS: Initial  KUB demonstrates  a nonobstructive bowel gas pattern.   Please note exam is limited as there was significant leakage of contrast external to the patient and exam. This limited the volume that could be instilled into the colon. There was retrograde filling of contrast from the rectum to the mid transverse colon. Please note there was significant external contamination of contrast in the region of the rectum on the initial images. However otherwise, there is no evidence of leak across the rectal anastomosis..       Exam is somewhat limited as there was significant leakage of contrast external to the patient, however there is no convincing evidence of distal colonic anastomosis leak.   MACRO: None   Signed by: Angelito Shaw 9/23/2024 12:06 PM Dictation workstation:   [id number]           Plan  Attention to ileostomy     POD#1:  S/p   Closure of loop ileostomy  Side-to-side stapled enteroenterostomy    - surgery as above  - avss  - Diet: advance to full liquids   - Pain control  - Nausea: antiemetics PRN  - Encourage OOB and IS  - Lovenox for DVT prophylaxis  - DC IVF    #HTN  - Home metoprolol resumed    #GERD  - Daily protonix     - Daily labs    Plan of care discussed and patient seen with Dr. Loyd Spencer, APRN-CNP    I spent 15 minutes in the professional and overall care of this patient.

## 2024-10-22 NOTE — PROGRESS NOTES
"Nutrition Diet Education  Reason for education: CORS    Nutrition Note:  Janis Ibanez \"Marquita\" is a 69 y.o. female presenting for planned ileostomy reversal.      Past Medical History   has a past medical history of Cholelithiasis, Coronary artery disease, High cholesterol, History of heart attack (2015), History of rectal or anal cancer, Hypertension, Presence of dental prosthetic device (complete) (partial), Psoriasis, Rectal cancer (Multi), and Wears glasses.  Surgical History   has a past surgical history that includes Anus surgery; Hysterectomy; Cholecystectomy; Coronary angioplasty with stent; Appendectomy; Colonoscopy; Flexible sigmoidoscopy; Pelvic laparoscopy; Angioplasty; Laparoscopy diagnostic / biopsy / aspiration / lysis; and Ileostomy.  Nutrition Significant Labs:  BMP Trend:   Results from last 7 days   Lab Units 10/22/24  0510   GLUCOSE mg/dL 106*   CALCIUM mg/dL 9.9   SODIUM mmol/L 135*   POTASSIUM mmol/L 4.3   CO2 mmol/L 23   CHLORIDE mmol/L 103   BUN mg/dL 17   CREATININE mg/dL 0.93        Current Diet: Adult diet Full Liquid    Encounter summary: Met with pt at bedside.  Pt notes she cannot tolerate Connor supplement and that gatorade and jello make her experience nausea/vomiting.  Appetite is good and she hopes to be advanced soon as she had a bowel movement today.  She has been omitting high fiber foods from her diet.  Notes that she has lost weight however has been stable over the past few months.  Per archives pt has had 10.8% weight loss since May despite adequate PO intakes.  Pt hopes to lose more but understands she needs to meet hydration/nutrition needs.    Education Documentation:   Education Documentation  Nutrition Care Manual, taught by Cole Thurston RD at 10/22/2024  3:38 PM.  Learner: Patient  Readiness: Acceptance  Method: Explanation  Response: Verbalizes Understanding  Comment: Discussed low fiber nutrition and goals to continue for 2-3 weeks unless otherwise indicated by " MD.  Discussed rationale of oral supplement to promote healing after surgery.              Nutrition Prescription/Recommended Diet:  Individualized Nutrition Prescription Provided for : Recommend continue FLD and advance per MD recs to suggested goal of Fiber Restricted dietc,  Suggest change from Connor to Ensure HP (160kcal, 16g pro) BID with breakfast and dinner to promote adequate protein/calorie intakes    Time Spent/Follow-up Reminder:   Time Spent (min): 60 minutes  Last Date of Nutrition Visit: 10/22/24  Nutrition Follow-Up Needed?: 3-8 days  Follow up Comment: MARK

## 2024-10-22 NOTE — CARE PLAN
The patient's goals for the shift include pain manageable level    The clinical goals for the shift include pain will be managed at tolerable level this shift    Patient progressing well, pain managed with tylenol. Dressing clean dry intact. Positive bowel sounds, no flatus yet. Tolerating clear liquids. voiding adequately. Ambulates regularly. VSS.     Problem: Pain  Goal: Takes deep breaths with improved pain control throughout the shift  Outcome: Met  Goal: Turns in bed with improved pain control throughout the shift  Outcome: Met  Goal: Walks with improved pain control throughout the shift  Outcome: Met     Problem: Chronic Conditions and Co-morbidities  Goal: Patient's chronic conditions and co-morbidity symptoms are monitored and maintained or improved  Outcome: Met

## 2024-10-23 VITALS
RESPIRATION RATE: 18 BRPM | OXYGEN SATURATION: 98 % | SYSTOLIC BLOOD PRESSURE: 133 MMHG | TEMPERATURE: 97.3 F | WEIGHT: 164 LBS | HEIGHT: 66 IN | DIASTOLIC BLOOD PRESSURE: 66 MMHG | BODY MASS INDEX: 26.36 KG/M2 | HEART RATE: 60 BPM

## 2024-10-23 LAB
ANION GAP SERPL CALC-SCNC: 9 MMOL/L (ref 10–20)
BUN SERPL-MCNC: 16 MG/DL (ref 6–23)
CALCIUM SERPL-MCNC: 9.6 MG/DL (ref 8.6–10.3)
CHLORIDE SERPL-SCNC: 107 MMOL/L (ref 98–107)
CO2 SERPL-SCNC: 26 MMOL/L (ref 21–32)
CREAT SERPL-MCNC: 0.96 MG/DL (ref 0.5–1.05)
EGFRCR SERPLBLD CKD-EPI 2021: 65 ML/MIN/1.73M*2
ERYTHROCYTE [DISTWIDTH] IN BLOOD BY AUTOMATED COUNT: 13.5 % (ref 11.5–14.5)
GLUCOSE SERPL-MCNC: 100 MG/DL (ref 74–99)
HCT VFR BLD AUTO: 35.2 % (ref 36–46)
HGB BLD-MCNC: 11.5 G/DL (ref 12–16)
MAGNESIUM SERPL-MCNC: 1.92 MG/DL (ref 1.6–2.4)
MCH RBC QN AUTO: 29.1 PG (ref 26–34)
MCHC RBC AUTO-ENTMCNC: 32.7 G/DL (ref 32–36)
MCV RBC AUTO: 89 FL (ref 80–100)
NRBC BLD-RTO: 0 /100 WBCS (ref 0–0)
PHOSPHATE SERPL-MCNC: 2.1 MG/DL (ref 2.5–4.9)
PLATELET # BLD AUTO: 246 X10*3/UL (ref 150–450)
POTASSIUM SERPL-SCNC: 4.2 MMOL/L (ref 3.5–5.3)
RBC # BLD AUTO: 3.95 X10*6/UL (ref 4–5.2)
SODIUM SERPL-SCNC: 138 MMOL/L (ref 136–145)
WBC # BLD AUTO: 9.6 X10*3/UL (ref 4.4–11.3)

## 2024-10-23 PROCEDURE — 83735 ASSAY OF MAGNESIUM: CPT | Performed by: NURSE PRACTITIONER

## 2024-10-23 PROCEDURE — 2500000001 HC RX 250 WO HCPCS SELF ADMINISTERED DRUGS (ALT 637 FOR MEDICARE OP): Performed by: STUDENT IN AN ORGANIZED HEALTH CARE EDUCATION/TRAINING PROGRAM

## 2024-10-23 PROCEDURE — 2500000004 HC RX 250 GENERAL PHARMACY W/ HCPCS (ALT 636 FOR OP/ED): Performed by: STUDENT IN AN ORGANIZED HEALTH CARE EDUCATION/TRAINING PROGRAM

## 2024-10-23 PROCEDURE — 82374 ASSAY BLOOD CARBON DIOXIDE: CPT | Performed by: STUDENT IN AN ORGANIZED HEALTH CARE EDUCATION/TRAINING PROGRAM

## 2024-10-23 PROCEDURE — 36415 COLL VENOUS BLD VENIPUNCTURE: CPT | Performed by: STUDENT IN AN ORGANIZED HEALTH CARE EDUCATION/TRAINING PROGRAM

## 2024-10-23 PROCEDURE — 85027 COMPLETE CBC AUTOMATED: CPT | Performed by: STUDENT IN AN ORGANIZED HEALTH CARE EDUCATION/TRAINING PROGRAM

## 2024-10-23 PROCEDURE — 84100 ASSAY OF PHOSPHORUS: CPT | Performed by: NURSE PRACTITIONER

## 2024-10-23 ASSESSMENT — PAIN SCALES - GENERAL: PAINLEVEL_OUTOF10: 3

## 2024-10-23 ASSESSMENT — PAIN - FUNCTIONAL ASSESSMENT: PAIN_FUNCTIONAL_ASSESSMENT: 0-10

## 2024-10-23 NOTE — DISCHARGE INSTRUCTIONS
WOUND CARE:  OK to shower.  Remove any dressings prior to showering.  Do not scrub your incision(s).  Pat down abdomen dry following shower and cover incisions with clean, dry gauze dressings if there is drainage.  No tub bath/soaking/swimming until cleared at outpatient appointment.  Do not remove any staples or stitches; if these are present they will be removed at outpatient appointment.  Any glue will peel away on its own.        PAIN CONTROL:  Can utilize ibuprofen or tylenol      ACTIVITY:  No heavy lifting (>10-15lbs) or strenuous activity for 6 weeks.  OK to walk and take stairs at slow pace.  Do not drive or operative heavy machinery for at least first 24 hours after surgery; if you do not feel able to safely operate after first 24 hours or are taking narcotics (ie percocet or vicodin) then do not operative heavy machinery or drive.     DIET:  Adhere to soft low-fiber diet until seen in follow-up.     FOLLOW-UP:  Please call office at [phone] to set up a follow-up appointment for approximately 2 weeks from the date of surgery.

## 2024-10-23 NOTE — DISCHARGE SUMMARY
Discharge Diagnosis  Attention to ileostomy  Hypophosphatemia  Hypertension  Hyperlipidemia  Gastroesophageal reflux disease  History of rectal cancer    Issues Requiring Follow-Up  Post-operative follow-up    Test Results Pending At Discharge  Pending Labs       Order Current Status    Surgical Pathology Exam In process            Hospital Course  Patient was taken to the operating room on day of admission for planned closure of loop ileostomy with side-to-side stapled enterostomy on 10/21/2024.  Patient tolerated the surgery well and postoperatively was taken to the regular nursing floor.  Patient was initiated on ERS protocol with clear liquid diet, low rate of intravenous fluids, multimodal pain management regimen, and DVT chemoprophylaxis.  Patient was resumed on home antihypertensive, hyperlipidemia, and gastroesophageal reflux medications.  On postoperative day 1, patient was advanced to a full liquid diet and intravenous fluids were discontinued.  Patient had full return of bowel function and diet was subsequently advanced to a low fiber diet.  On postoperative day 2, patient had tolerated diet advancement, achieved adequate pain control, and was able to void and ambulate independently.  Patient was noted to have hypophosphatemia which was repleted orally.  Patient was deemed appropriate for discharge and thus discharged to home with instructions for wound care, pain control, diet, activity, and follow-up.    Pertinent Physical Exam At Time of Discharge  Physical Exam  Constitutional:       General: He is not in acute distress.     Appearance: Normal appearance. He is not ill-appearing.   HENT:      Head: Normocephalic.      Right Ear: External ear normal.      Left Ear: External ear normal.      Nose: Nose normal.      Mouth/Throat:      Mouth: Mucous membranes are moist.   Eyes:      Extraocular Movements: Extraocular movements intact.      Conjunctiva/sclera: Conjunctivae normal.   Cardiovascular:       Rate and Rhythm: Normal rate and regular rhythm.      Pulses: Normal pulses.      Heart sounds: Normal heart sounds. No murmur heard.  Pulmonary:      Effort: Pulmonary effort is normal. No respiratory distress.      Breath sounds: Normal breath sounds. No wheezing, rhonchi or rales.   Chest:      Chest wall: No tenderness.   Abdominal:      General: There is no distension.      Palpations: Abdomen is soft. There is no mass.      Tenderness: There is abdominal tenderness. There is no guarding or rebound.      Hernia: No hernia is present.      Comments: RLQ ileostomy site C/D.  Appropriately TTP without rebound/guarding/peritoneal signs.   Musculoskeletal:         General: No swelling or deformity.      Cervical back: Neck supple. No rigidity.      Right lower leg: No edema.      Left lower leg: No edema.   Skin:     General: Skin is warm.      Coloration: Skin is not jaundiced or pale.   Neurological:      Mental Status: He is alert.         Home Medications     Medication List      CHANGE how you take these medications     rosuvastatin 5 mg tablet; Commonly known as: Crestor; Take 1 tablet (5   mg) by mouth once daily.; What changed: when to take this     CONTINUE taking these medications     aspirin 81 mg chewable tablet; Chew 1 tablet (81 mg) once daily.   cyanocobalamin 1,000 mcg tablet; Commonly known as: Vitamin B-12   lisinopril 10 mg tablet; Take 1 tablet (10 mg) by mouth 2 times a day.   metoprolol succinate XL 25 mg 24 hr tablet; Commonly known as:   Toprol-XL; Take 1 tablet (25 mg) by mouth once daily. Do not crush or   chew.   omeprazole 20 mg DR capsule; Commonly known as: PriLOSEC     STOP taking these medications     acetaminophen 325 mg tablet; Commonly known as: Tylenol   gabapentin 100 mg capsule; Commonly known as: Neurontin   metroNIDAZOLE 250 mg tablet; Commonly known as: Flagyl   neomycin 500 mg tablet; Commonly known as: Mycifradin       Outpatient Follow-Up  Future Appointments   Date Time  Provider Department Center   11/12/2024 10:15 AM Kolton Lam MD [id number]       Gustavo Harp MD

## 2024-10-23 NOTE — CARE PLAN
The patient's goals for the shift include pain manageable level    The clinical goals for the shift include Patient will tolerate diet this shift. No episodes of N/V. Patient will ambulate at least 3 times this shift.    BM yesterday, diet was advanced. Tolerating low fiber diet. No N/V. Ambulates frequently. Pain still managed on tylenol. Adequate rest this shift. VSS. No acute events over night.     Problem: Pain  Goal: Performs ADL's with improved pain control throughout shift  Outcome: Met  Goal: Participates in PT with improved pain control throughout the shift  Outcome: Met  Goal: Free from opioid side effects throughout the shift  Outcome: Met  Goal: Free from acute confusion related to pain meds throughout the shift  Outcome: Met     Problem: Pain - Adult  Goal: Verbalizes/displays adequate comfort level or baseline comfort level  Outcome: Met     Problem: Safety - Adult  Goal: Free from fall injury  Outcome: Met     Problem: Discharge Planning  Goal: Discharge to home or other facility with appropriate resources  Outcome: Progressing     Problem: Chronic Conditions and Co-morbidities  Goal: Patient's chronic conditions and co-morbidity symptoms are monitored and maintained or improved  Outcome: Met

## 2024-10-23 NOTE — DOCUMENTATION CLARIFICATION NOTE
"    PATIENT:               NOELLE LEVIN  ACCT #:                  [id number]  MRN:                       [id number]  :                       1955  ADMIT DATE:       10/21/2024 5:49 AM  DISCH DATE:  RESPONDING PROVIDER #:        [id number]          PROVIDER RESPONSE TEXT:    Hemodilution    CDI QUERY TEXT:    Clarification    Instruction:    Based on your assessment of the patient and the clinical information, please provide the requested documentation by clicking on the appropriate radio button and enter any additional information if prompted.    Question: Is there a diagnosis indicative of the lab values and clinical indicators    When answering this query, please exercise your independent professional judgment. The fact that a question is being asked, does not imply that any particular answer is desired or expected.    The patient's clinical indicators include:  Clinical Information: 68 y/o F admitted for Attention to ileostomy  s/p closure of loop ileostomy    Clinical Indicators:    Hemoglobin:  10/07/24 11:23: 14.0  10/22/24 05:10: 12.2  10/23/24 05:53: 11.5    OP note 10/21 Dr. Junior: \"Post op diagnosis: Attention to ileostomy.  Procedure: Closure of loop ileostomy, Side-to-side stapled enteroenterostomy.  Estimated Blood Loss: 50mL\"    D/C summary 10/23 Dr. Junior: \"Attention to ileostomy\"    Treatment: daily labs, trend H&H, preop type and screen    Risk Factors: Attention to ileostomy  s/p closure of loop ileostomy, EBL 50ml  Options provided:  -- Acute blood loss anemia 2/2 surgery  -- Other - I will add my own diagnosis  -- Refer to Clinical Documentation Reviewer    Query created by: Roseanna Morris on 10/23/2024 10:11 AM      Electronically signed by:  AMANDA JUNIOR MD 10/23/2024 10:30 AM          "

## 2024-10-23 NOTE — CARE PLAN
Problem: Discharge Planning  Goal: Discharge to home or other facility with appropriate resources  10/23/2024 1052 by Josie Almazan, RN  Outcome: Adequate for Discharge  10/23/2024 0942 by Josie Almazan, RN  Outcome: Progressing

## 2024-10-23 NOTE — CARE PLAN
The patient's goals for the shift include pain manageable level    The clinical goals for the shift include Patient will tolerate diet this shift. No episodes of N/V. Patient will ambulate at least 3 times this shift.      Problem: Discharge Planning  Goal: Discharge to home or other facility with appropriate resources  Outcome: Progressing

## 2024-10-25 LAB
LABORATORY COMMENT REPORT: NORMAL
PATH REPORT.FINAL DX SPEC: NORMAL
PATH REPORT.GROSS SPEC: NORMAL
PATH REPORT.MICROSCOPIC SPEC OTHER STN: NORMAL
PATH REPORT.RELEVANT HX SPEC: NORMAL
PATH REPORT.TOTAL CANCER: NORMAL

## 2024-11-01 ASSESSMENT — ENCOUNTER SYMPTOMS
ACTIVITY CHANGE: 0
CHEST TIGHTNESS: 0
PALPITATIONS: 0
COLOR CHANGE: 0
DIARRHEA: 0
UNEXPECTED WEIGHT CHANGE: 0
FREQUENCY: 0
ABDOMINAL DISTENTION: 0
VOMITING: 0
CONSTIPATION: 0
CHILLS: 0
FEVER: 0
SHORTNESS OF BREATH: 0
NAUSEA: 0
FATIGUE: 0
RECTAL PAIN: 0
COUGH: 0
DYSURIA: 0
DIAPHORESIS: 0
DIFFICULTY URINATING: 0
ANAL BLEEDING: 0
HEMATURIA: 0
HEMATOLOGIC/LYMPHATIC NEGATIVE: 1
APPETITE CHANGE: 0
BLOOD IN STOOL: 0

## 2024-11-01 NOTE — PROGRESS NOTES
FRANCINE Ibanez is a 69 y.o. female with a history of anal cancer 20 years ago for which she received chemo and radiation. She was scheduled for sigmoid resection with Dr. Olivares for a rectosigmoid mass with biopsy proven adenocarcinoma in [place]. MMR intact. (No records of this available in Epic). On 10/27 she underwent lap THERON with ureteral stent insertion. The procedure was stopped due to bulky disease.    She was seen by oncology and had a port placed on 11/21/23. Presented on [facility] 11/29/23. She started chemotherapy; follows with Dr. Mayes.  She was seen 4/17 where a flex sig was conducted with biopsy.  She then underwent an open LAR, mobilization splenic flexure, 29 EEA CRA, flex sig, and DLI on 5/17/24. Path demonstrating invasive moderately differentiated adenocarcinoma of rectum, T3N1b. Metastatic adenocarcinoma identified in 2/40 lymph nodes. She had some bms per rectum during hospital admission, but overall unremarkable stay. She was discharged home with  home care on 5/23/24.  She was last seen in office on 6/11/24 and she was presented to  and the recommendations were for patient to follow-up with Dr. Mayes. She recently called the office to inform that she does not need chemotherapy as she is cancer free, after PET CT Showed no evidence of reoccurrence on 8/23/24.    She obtained a GGE that showed no evidence of leak on 9/23/24. Followed up with a colonoscopy on 10/17/24 with Dr. Harp, where she was found to have a healthy anastomosis in the rectum and pancolonic erythematous, petechial mucosa. No specimens collected. She is s/p ileostomy closure and side to side stapled enteroenterostomy on 10/21/24. Path demonstrating ileocutaneous stomal sites with ischemia-related changes at bowel/skin. Interfaces, not otherwise remarkable. She had an uncomplicated stay and was discharged home on 10/23/24. She presents today for follow up.     Patient reports that she is doing well overall.  No  abdominal pain.  She is tolerating low fiber diet without difficulty.  Denies nausea or emesis.  She is moving her bowels approximately once daily.  Stools have been formed.  She has used some imodium intermittently.  No hematochezia, melena.  No dysuria or hematuria.  No fevers, chills or sweats.  Patient is preparing to travel to [place] next week for winter season.  She is planning on contacting Dr. Mayes to discuss surveillance plans while out of state.    CEA 10/2023: 20.7; 2/19/24: 5.4; 3/28/24: 2.3    GGE 9/23/24:  Impression:   - Exam is somewhat limited as there was significant leakage of contrast external to the patient, however there is no convincing evidence of distal colonic anastomosis leak.    PET CT 8/23/24: 1. Postsurgical changes of low anterior resection of previous colorectal adenocarcinoma, an intense FDG avid presacral soft tissue thickening at the postsurgical site ,compatible with recurrent disease.  2. Focal FDG uptake at the anterior abdominal wall excision site, likely represent postsurgical inflammation.  3. No other concerning FDG avid disease identified.    CT c/a/p 4/4/24: Left lower lobe stable 5 mm nodule as well as a right lower lobe stable 2 mm nodule, more likely benign. No significant new or enlarging pulmonary nodule.  - Nonspecific mildly prominent pretracheal lymph node measuring 1 cm in short axis unchanged. No new thoracic lymphadenopathy.   - Some irregular rectal wall thickening which may be related to patient's known neoplasm. Please see report of recent MRI rectum 04/02/2024 for more detail.  - Distal colonic diverticulosis without focal acute diverticulitis.  - Nonspecific mild wall prominence of the mid-distal sigmoid colon may be partially exaggerated by underdistention.  - No bowel obstruction.   - Small nonspecific retroperitoneal lymph nodes which are not individually pathologically enlarged.  - Nonspecific mild fat stranding of the retroperitoneum. No  retroperitoneal fluid collection.  - Subcentimeter low-attenuation lesion in the dome of the left hepatic lobe too small to characterize but unchanged from prior, more likely benign. No new focal hepatic lesion.    MRI rectum 4/2/24: Favorable treatment response. The previous upper-mid rectal mass has significantly regressed, however there is still small focus of restricted diffusion in the mass posteriorly which could reflect residual tumor. Previous involvement of the mesorectal fascia and peritoneal reflection is no longer visualized, although there appear to be posttreatment related fibrosis at this site. The previously enlarged presacral lymph nodes have regressed in size, the dominant nodes from 7 mm short axis to 3mm. No current lymphadenopathy identified.    Cardiac stress test 10/20/23: Normal Lexiscan Myoview cardiac perfusion stress test. No evidence of ischemia or myocardial infarction by perfusion imaging. Normal left ventricular systolic function, ejection fraction 68%. None invasive risk stratification is low risk.    Colonoscopy 10/17/24([facility]):   Impression:   Healthy end-to-end colorectal anastomosis in the rectum  Pancolonic erythematous, petechial mucosa  Repeat in 1 year.   No specimens collected.      Current smoker- 1/4 ppd/No ETOH/No Illicit drug use  No family history of CRC or IBD  PMH: MI, coronary stents about 10 years ago, HTN, Anal cancer, A.fib  PSH: open cholecystectomy, open appendectomy, open hysterectomy, vascular surgery on right groin for blockage  Employment: Retired    Past Medical History:   Diagnosis Date    Cholelithiasis     Coronary artery disease     High cholesterol     History of heart attack 2015    History of rectal or anal cancer     20 yrs ago    Hypertension     Presence of dental prosthetic device (complete) (partial)     upper and lower partail    Psoriasis     Rectal cancer (Multi)     Wears glasses        Past Surgical History:   Procedure Laterality Date     ANGIOPLASTY      ANUS SURGERY      APPENDECTOMY      CHOLECYSTECTOMY      COLONOSCOPY      CORONARY ANGIOPLASTY WITH STENT PLACEMENT      FLEXIBLE SIGMOIDOSCOPY      HYSTERECTOMY      ILEOSTOMY      LAPAROSCOPY DIAGNOSTIC / BIOPSY / ASPIRATION / LYSIS      10/27/2023 noted bulk disease    PELVIC LAPAROSCOPY       Current Outpatient Medications on File Prior to Visit   Medication Sig Dispense Refill    aspirin 81 mg chewable tablet Chew 1 tablet (81 mg) once daily. 90 tablet 3    cyanocobalamin (Vitamin B-12) 1,000 mcg tablet Take 1 tablet (1,000 mcg) by mouth once daily at bedtime.      lisinopril 10 mg tablet Take 1 tablet (10 mg) by mouth 2 times a day. (Patient taking differently: Take 1 tablet (10 mg) by mouth once daily.) 180 tablet 3    metoprolol succinate XL (Toprol-XL) 25 mg 24 hr tablet Take 1 tablet (25 mg) by mouth once daily. Do not crush or chew. 90 tablet 3    omeprazole (PriLOSEC) 20 mg DR capsule Take 1 capsule (20 mg) by mouth once daily. Do not crush or chew.      rosuvastatin (Crestor) 5 mg tablet Take 1 tablet (5 mg) by mouth once daily. (Patient taking differently: Take 1 tablet (5 mg) by mouth once daily at bedtime.) 90 tablet 3     No current facility-administered medications on file prior to visit.       No Known Allergies    Review of Systems   Constitutional:  Negative for activity change, appetite change, chills, diaphoresis, fatigue, fever and unexpected weight change.   Respiratory:  Negative for cough, chest tightness and shortness of breath.    Cardiovascular:  Negative for chest pain, palpitations and leg swelling.   Gastrointestinal:  Negative for abdominal distention, abdominal pain, anal bleeding, blood in stool, constipation, diarrhea, nausea, rectal pain and vomiting.   Genitourinary:  Negative for difficulty urinating, dysuria, frequency, hematuria and pelvic pain.   Skin:  Negative for color change.   Hematological: Negative.    All other systems reviewed and are  negative.      Physical Exam  Constitutional:       General: He is not in acute distress.     Appearance: Normal appearance. He is not ill-appearing.   HENT:      Head: Normocephalic.      Mouth/Throat:      Mouth: Mucous membranes are moist.   Eyes:      Extraocular Movements: Extraocular movements intact.   Pulmonary:      Effort: Pulmonary effort is normal. No respiratory distress.   Abdominal:      General: There is no distension.      Palpations: Abdomen is soft. There is no mass.      Tenderness: There is no abdominal tenderness. There is no guarding or rebound.      Hernia: No hernia is present.      Comments: RLQ ileostomy site C/D.  Healing appropriately.     Musculoskeletal:      Cervical back: Normal range of motion and neck supple. No rigidity.      Right lower leg: No edema.      Left lower leg: No edema.   Lymphadenopathy:      Cervical: No cervical adenopathy.   Skin:     General: Skin is warm and dry.      Capillary Refill: Capillary refill takes less than 2 seconds.      Coloration: Skin is not jaundiced or pale.   Neurological:      General: No focal deficit present.      Mental Status: He is alert and oriented to person, place, and time. Mental status is at baseline.   Psychiatric:         Mood and Affect: Mood normal.         Behavior: Behavior normal.         Thought Content: Thought content normal.         Judgment: Judgment normal.           Assessment and Plan:   #Upper rectal adenocarcinoma, MMR intact per review of OSH pathology report, S/P diagnostic laparoscopy with some colonic mobilization and THERON at [facility] 10/27/2023 with aborted attempt at oncologic resection followed by chemotherapy (FOLFOX), final 8th cycle 3/4/2024, S/P robotic converted to open LAR, mobilization splenic flexure, CRA, flexible sigmoidoscopy, DLI 5/17/2024, final pathology pT3N1b, negative margins, 2/40LNs involved, positive PNI, positive large vessel (venous) extramural invasion  #Attention ileostomy S/P reversal  10/21/2024  #Remote hx anal Ca S/P primary resection and CRT    -  Expected post-operative course  -  Pathology reviewed with patient  -  OK to start liberalizing diet  -  No strenuous activity or heavy lifting until 6 weeks post-op  -  Follow-up with me in 6 months  -  Instructed patient to discuss surveillance plan/schedule with Dr. Mayes prior to leaving for [place] for winter    Gustavo Harp MD   11/5/2024  5:03 PM

## 2024-11-05 ENCOUNTER — APPOINTMENT (OUTPATIENT)
Dept: SURGERY | Facility: CLINIC | Age: 69
End: 2024-11-05
Payer: COMMERCIAL

## 2024-11-05 VITALS
HEART RATE: 80 BPM | DIASTOLIC BLOOD PRESSURE: 75 MMHG | HEIGHT: 66 IN | SYSTOLIC BLOOD PRESSURE: 147 MMHG | WEIGHT: 164 LBS | BODY MASS INDEX: 26.36 KG/M2

## 2024-11-05 DIAGNOSIS — Z43.2 ATTENTION TO ILEOSTOMY: Primary | ICD-10-CM

## 2024-11-05 DIAGNOSIS — C20 RECTAL CANCER (MULTI): ICD-10-CM

## 2024-11-05 PROCEDURE — 1111F DSCHRG MED/CURRENT MED MERGE: CPT | Performed by: STUDENT IN AN ORGANIZED HEALTH CARE EDUCATION/TRAINING PROGRAM

## 2024-11-05 PROCEDURE — 99024 POSTOP FOLLOW-UP VISIT: CPT | Performed by: STUDENT IN AN ORGANIZED HEALTH CARE EDUCATION/TRAINING PROGRAM

## 2024-11-05 PROCEDURE — 3008F BODY MASS INDEX DOCD: CPT | Performed by: STUDENT IN AN ORGANIZED HEALTH CARE EDUCATION/TRAINING PROGRAM

## 2024-11-05 PROCEDURE — 1159F MED LIST DOCD IN RCRD: CPT | Performed by: STUDENT IN AN ORGANIZED HEALTH CARE EDUCATION/TRAINING PROGRAM

## 2024-11-05 ASSESSMENT — ENCOUNTER SYMPTOMS: ABDOMINAL PAIN: 0

## 2024-11-12 ENCOUNTER — APPOINTMENT (OUTPATIENT)
Dept: CARDIOLOGY | Facility: CLINIC | Age: 69
End: 2024-11-12
Payer: COMMERCIAL

## 2024-11-12 VITALS
BODY MASS INDEX: 28.34 KG/M2 | DIASTOLIC BLOOD PRESSURE: 70 MMHG | HEART RATE: 60 BPM | HEIGHT: 65 IN | SYSTOLIC BLOOD PRESSURE: 116 MMHG | WEIGHT: 170.1 LBS

## 2024-11-12 DIAGNOSIS — E78.2 MIXED HYPERLIPIDEMIA: ICD-10-CM

## 2024-11-12 DIAGNOSIS — I21.9 MYOCARDIAL INFARCTION, UNSPECIFIED MI TYPE, UNSPECIFIED ARTERY (MULTI): ICD-10-CM

## 2024-11-12 DIAGNOSIS — I25.10 CAD S/P PERCUTANEOUS CORONARY ANGIOPLASTY: ICD-10-CM

## 2024-11-12 DIAGNOSIS — F17.200 CURRENT EVERY DAY SMOKER: ICD-10-CM

## 2024-11-12 DIAGNOSIS — Z98.61 CAD S/P PERCUTANEOUS CORONARY ANGIOPLASTY: ICD-10-CM

## 2024-11-12 DIAGNOSIS — C18.7 CANCER OF SIGMOID COLON (MULTI): ICD-10-CM

## 2024-11-12 DIAGNOSIS — C20 RECTAL CANCER (MULTI): ICD-10-CM

## 2024-11-12 PROCEDURE — 3008F BODY MASS INDEX DOCD: CPT | Performed by: INTERNAL MEDICINE

## 2024-11-12 PROCEDURE — 1111F DSCHRG MED/CURRENT MED MERGE: CPT | Performed by: INTERNAL MEDICINE

## 2024-11-12 PROCEDURE — 1159F MED LIST DOCD IN RCRD: CPT | Performed by: INTERNAL MEDICINE

## 2024-11-12 PROCEDURE — 99213 OFFICE O/P EST LOW 20 MIN: CPT | Performed by: INTERNAL MEDICINE

## 2024-11-12 RX ORDER — ROSUVASTATIN CALCIUM 5 MG/1
5 TABLET, COATED ORAL DAILY
Qty: 90 TABLET | Refills: 3 | Status: SHIPPED | OUTPATIENT
Start: 2024-11-12 | End: 2025-11-12

## 2024-11-12 RX ORDER — METOPROLOL SUCCINATE 25 MG/1
25 TABLET, EXTENDED RELEASE ORAL DAILY
Qty: 90 TABLET | Refills: 3 | Status: SHIPPED | OUTPATIENT
Start: 2024-11-12 | End: 2025-11-12

## 2024-11-12 RX ORDER — NITROGLYCERIN 0.4 MG/1
0.4 TABLET SUBLINGUAL EVERY 5 MIN PRN
Qty: 25 TABLET | Refills: 5 | Status: SHIPPED | OUTPATIENT
Start: 2024-11-12 | End: 2024-11-12

## 2024-11-12 RX ORDER — NITROGLYCERIN 0.4 MG/1
0.4 TABLET SUBLINGUAL EVERY 5 MIN PRN
Qty: 25 TABLET | Refills: 5 | Status: SHIPPED | OUTPATIENT
Start: 2024-11-12 | End: 2025-11-12

## 2024-11-12 RX ORDER — LISINOPRIL 10 MG/1
10 TABLET ORAL 2 TIMES DAILY
Qty: 180 TABLET | Refills: 3 | Status: SHIPPED | OUTPATIENT
Start: 2024-11-12 | End: 2025-11-12

## 2024-11-12 NOTE — PROGRESS NOTES
Referred by Dr. Barrett ref. provider found provider found for No chief complaint on file.       History of Present Illness  Janis Ibanez is a 69 y.o. year old female patient follow-up.  Status post remote coronary angioplasty with stent plantation.  She underwent colon resection surgery and chemotherapy for colon cancer.  She is cancer free for now.  She is moving to [place] and she will she may end up staying in [place] although she may come back in the summertime.  From a cardiac standpoint she has not had any issues with her during her surgery..  I discussed with the patient that continue medication will call for any problem and follow-up as scheduled    Past Medical History  Past Medical History:   Diagnosis Date    Cholelithiasis     Coronary artery disease     High cholesterol     History of heart attack 2015    History of rectal or anal cancer     20 yrs ago    Hypertension     Presence of dental prosthetic device (complete) (partial)     upper and lower partail    Psoriasis     Rectal cancer (Multi)     Wears glasses        Social History  Social History     Tobacco Use    Smoking status: Every Day     Current packs/day: 0.25     Average packs/day: 0.3 packs/day for 52.9 years (13.2 ttl pk-yrs)     Types: Cigarettes     Start date: 1972    Smokeless tobacco: Never   Vaping Use    Vaping status: Never Used   Substance Use Topics    Alcohol use: Not Currently    Drug use: Yes     Types: Marijuana     Comment: gummies for sleep at bedtime       Family History     Family History   Problem Relation Name Age of Onset    Hypertension Mother      Heart disease Mother      Other (cardiac stents) Mother      Hypertension Father      Heart attack Father      Prostate cancer Father         Review of Systems  As per HPI, all other systems reviewed and negative.    Allergies:  No Known Allergies     Outpatient Medications:  Current Outpatient Medications   Medication Instructions    aspirin 81 mg, oral, Daily     cyanocobalamin (VITAMIN B-12) 1,000 mcg, Nightly    lisinopril 10 mg, oral, 2 times daily    metoprolol succinate XL (TOPROL-XL) 25 mg, oral, Daily, Do not crush or chew.    omeprazole (PRILOSEC) 20 mg, Daily    rosuvastatin (CRESTOR) 5 mg, oral, Daily         Vitals:  There were no vitals filed for this visit.    Physical Exam:  Physical Exam  Vitals and nursing note reviewed.   Constitutional:       Appearance: Normal appearance.   HENT:      Head: Normocephalic.   Eyes:      Pupils: Pupils are equal, round, and reactive to light.   Cardiovascular:      Rate and Rhythm: Normal rate and regular rhythm.      Pulses: Normal pulses.      Heart sounds: Normal heart sounds.   Pulmonary:      Effort: Pulmonary effort is normal.      Breath sounds: Normal breath sounds.   Musculoskeletal:         General: Normal range of motion.      Cervical back: Normal range of motion.   Skin:     General: Skin is dry.   Neurological:      General: No focal deficit present.      Mental Status: He is alert and oriented to person, place, and time.   Psychiatric:         Behavior: Behavior is cooperative.             Assessment/Plan   Diagnoses and all orders for this visit:  CAD S/P percutaneous coronary angioplasty  Myocardial infarction, unspecified MI type, unspecified artery (Multi)  Mixed hyperlipidemia  Cancer of sigmoid colon (Multi)  Rectal cancer (Multi)  BMI 28.0-28.9,adult  Current every day smoker          Kolton Lam MD [facility]  Interventional Cardiology   of [facility]     Thank you for allowing me to participate in the care of this patient. Please do not hesitate to contact me with any further questions or concerns.      WALLY, MP2, denies loose teeth

## 2024-11-12 NOTE — PATIENT INSTRUCTIONS
Follow up office visit in 9 months  Continue same medications/treatment.  Patient educated on proper medication use.  Patient educated on risk factor modification.  Please bring any lab results from other providers / physicians to your next appointment.    Please bring all medicines, vitamins and herbal supplements with you when you come to the office.    Prescriptions will not be filled unless you are compliant with your follow up appointments or have a follow up  appointment scheduled as per instruction of your physician.  Refills should be requested at the time of  Your visit.    IYeni LPN, am scribing for and in the presence of  Dr. Kolton Lam MD, FACC

## 2025-04-28 ENCOUNTER — TELEPHONE (OUTPATIENT)
Dept: SURGERY | Facility: HOSPITAL | Age: 70
End: 2025-04-28
Payer: COMMERCIAL

## 2025-04-28 NOTE — TELEPHONE ENCOUNTER
"Pt called and asked can she have her FuV via zoom, or audio phone call. I reached out to Yesi and this was our email..    \"Hey,     Do you know if Dr. Harp see pts virtually? This pt is in [place] and doesn't  want to just have to fly up for this visit if she can do the EPV via audio.\"    Yesi responded w/     \"No.. He really doesn't do phone visits.    Eyana- do you think he would for this patient?\"    [facility]         "

## 2025-05-06 ENCOUNTER — APPOINTMENT (OUTPATIENT)
Dept: SURGERY | Facility: CLINIC | Age: 70
End: 2025-05-06
Payer: COMMERCIAL

## (undated) DEVICE — Device

## (undated) DEVICE — SUTURE, PROLENE, 2-0, 36 IN, SH, DA, BLUE

## (undated) DEVICE — NEEDLE, SAFETY, 25 GA X 1.5 IN

## (undated) DEVICE — IRRIGATION SET, CYSTOSCOPY, REGULATING CLAMP, STRAIGHT, 81 IN

## (undated) DEVICE — DRAPE, SHEET, XL

## (undated) DEVICE — GLOVE, SURGICAL, BIOGEL, 7.5, PF, LATEX, GREEN

## (undated) DEVICE — TROCAR, OPTICAL BLADELESS 5MM X 10 W/ADVANCED FIXATION

## (undated) DEVICE — STAPLER,  ECHELON CIRCULAR POWERED, 29MM, DISP

## (undated) DEVICE — SOLUTION, IRRIGATION, SODIUM CHLORIDE 0.9%, 1000 ML, POUR BOTTLE

## (undated) DEVICE — DRAPE, FLUID WARMER

## (undated) DEVICE — GRASPER, W/ CARTRIDGE, 5MM X 45CM, DISP

## (undated) DEVICE — TUBING, SUCTION, 6MM X 10, CLEAN N-COND

## (undated) DEVICE — SUTURE, VICRYL, 2-0, 18 IN, VIOLET

## (undated) DEVICE — PROTECTOR, NERVE, ULNAR, PINK

## (undated) DEVICE — GOWN, SURGICAL, ROYAL SILK, XL, STERILE

## (undated) DEVICE — TRAY, DRY PREP, PREMIUM

## (undated) DEVICE — ENDO, PORT VERSASTEP 5MM

## (undated) DEVICE — DRAPE, LEGGINGS, 48 X 31 IN, STERILE, LF

## (undated) DEVICE — TOWEL PACK 10-PK

## (undated) DEVICE — SOLUTION, IRRIGATION, STERILE WATER, 1000 ML, POUR BOTTLE

## (undated) DEVICE — DRAPE, UNDERBUTTOCKS

## (undated) DEVICE — STAPLER, SKIN, PLUS, WIDE, 35

## (undated) DEVICE — SYRINGE, CONTROL, ANGIOGRAPHIC, FIXED MALE LUER, 10 CC

## (undated) DEVICE — SUTURE, VICRYL, 3-0, 18 IN, VIOLEET

## (undated) DEVICE — SYRINGE, 10 CC, LUER LOCK

## (undated) DEVICE — CAUTERY, PENCIL, PUSH BUTTON, SMOKE EVAC, 70MM

## (undated) DEVICE — NEEDLE, INSUFFLATION, 14 G, 100 MM

## (undated) DEVICE — DRAPE SHEET, UTILITY, 26 X 15, W/ TAPE, STERILE

## (undated) DEVICE — SUTURE, VICRYL, 3-0, 27 IN, SH

## (undated) DEVICE — TRAY, SURESTEP, SILICONE DRAINAGE BAG, STATLOCK, 16FR

## (undated) DEVICE — DRESSING, GAUZE, SPONGE, 12 PLY, 4 X 4 IN, PLASTIC POUCH, STRL 10PK

## (undated) DEVICE — COVER, TIP HOT SHEARS ENDOWRIST

## (undated) DEVICE — GOWN, SURGICAL, ROYAL SILK, LG, STERILE

## (undated) DEVICE — CUTTER, PROX LINEAR, 75MM, REG TISSUE, W/ SAFETY LOCK OUT

## (undated) DEVICE — SUTURE, PDSII, 1, TP-1, VIL, MONO, 48LP

## (undated) DEVICE — SOLUTION KIT, ANTIFOG, 6CC, LF

## (undated) DEVICE — GLOVE, SURGICAL, PROTEXIS PI , 7.5, PF, LF

## (undated) DEVICE — SEAL, UNIVERSAL 5-8MM  XI

## (undated) DEVICE — SHEAR, W/UNIPOLAR CAUTERY, ENDOSHEAR, 5 MM

## (undated) DEVICE — CORD, MONOPOLARD, 10FT, DISP

## (undated) DEVICE — SUTURE, PDS II, 1, 27 IN, CT-1, VIOLET

## (undated) DEVICE — BAG, DECANTER

## (undated) DEVICE — BOWL, BASIN, 32 OZ, STERILE

## (undated) DEVICE — SUTURE, MONOCRYL, 4-0, 27 IN, PS-2, UNDYED

## (undated) DEVICE — TOWEL PACK, STERILE, 4/PACK, BLUE

## (undated) DEVICE — LIGASURE IMPACT, 18CM

## (undated) DEVICE — SYRINGE, 5 CC, LUER LOCK

## (undated) DEVICE — GOWN, ASTOUND, XL

## (undated) DEVICE — DRAPE, C-ARM IMAGE

## (undated) DEVICE — DRAPE, FLUID WARMING

## (undated) DEVICE — PUMP, STRYKERFLOW 2 & HANDPIECE W/10FT. IRRIGATION TUBING

## (undated) DEVICE — SUTURE, PROLENE, 0, 30 IN, SH

## (undated) DEVICE — STAPLER, LINEAR, RELOADABLE, 60MM 4.8, GREEN

## (undated) DEVICE — DRAPE, LEGGINGS, 28.5 X 43 IN, DISPOSABLE, LF, STERILE

## (undated) DEVICE — STRAP, VELCRO, BODY, 4 X 60IN, NS

## (undated) DEVICE — REST, HEAD, BAGEL, 9 IN

## (undated) DEVICE — SUTURE, VICRYL, 2-0, 27 IN, SH, UNDYED

## (undated) DEVICE — BAG, DRAINAGE, URINARY, W/ANTI-REFLUX CHAMBER, INFECTION CON

## (undated) DEVICE — SUTURE, VICRYL, 0, 18 IN, TIE, VIOLET

## (undated) DEVICE — DRESSING, ADHESIVE, ISLAND, TELFA, 4 X 14 IN

## (undated) DEVICE — NEEDLE, SAFETY, 18 G X 1.5 IN

## (undated) DEVICE — DRESSING, ABDOMINAL PAD, CURITY, 7.5 X 8 IN

## (undated) DEVICE — SOLUTION, IRRIGATION, USP, SODIUM CHLORIDE 0.9%, 3000 ML

## (undated) DEVICE — SHEARS, HARMONIC 1100, 5MM X 36 CM, CURVED

## (undated) DEVICE — TUBING, INSUFFLATION, LAPAROSCOPIC, FILTER, 10 FT

## (undated) DEVICE — OBTURATOR, BLADELESS , SU

## (undated) DEVICE — LUBRICANT, SURGILUBE, STERILE, 2OZ

## (undated) DEVICE — SCALPEL, SURGICAL, W/BLADE, 15, DISPOSABLE, STAINLESS STEEL

## (undated) DEVICE — SUTURE, VICRYL, 0, 18 IN,TIE, UNDYED

## (undated) DEVICE — TROCAR, KII OPTICAL BLADELESS 5MM Z THREAD 100MM LNGTH

## (undated) DEVICE — ELECTRODE, ELECTROSURGICAL, BLADE, INSULATED, ENT/IMA, STERILE

## (undated) DEVICE — STAPLER, LINEAR, 3.5 60MM, RELOADABLE, BLUE

## (undated) DEVICE — MAT, FLOOR, STANDARD FLUID BARRIER, 32X44, GREEN

## (undated) DEVICE — DRAPE, SHEET, ENDOSCOPY, GENERAL, FENESTRATED, ARMBOARD COVER, 98 X 123.5 IN, DISPOSABLE, LF, STERILE

## (undated) DEVICE — SUTURE, VICRYL, 3-0, 27 IN X-1, UNDYED

## (undated) DEVICE — MANIFOLD, 4 PORT NEPTUNE STANDARD

## (undated) DEVICE — CARE KIT, LAPAROSCOPIC, ADVANCED

## (undated) DEVICE — COVER, TABLE, 54X90

## (undated) DEVICE — DRAIN, JP CHANNEL, 15 FR, RND, W/TROCAR

## (undated) DEVICE — GLOVES, SURG BIOGEL, SZ-7.5, PF, LF

## (undated) DEVICE — GUIDWIRE, NITINOL, 0.35  150CM, STRAIGHT TIP, LF

## (undated) DEVICE — ELECTRODE, ELECTROSURGICAL, LAPAROSCOPIC, L HOOK TIP, 36 IN

## (undated) DEVICE — STRAP, ARM BOARD, 32 X 1.5

## (undated) DEVICE — DRAPE, POUCH, IRRIGATION, 20 X 24, W/FILTER DRAINAGE

## (undated) DEVICE — POSITIONING, THE PINK PAD, PIGAZZI SYSTEM

## (undated) DEVICE — ELECTRODE, ELECTROSURGICAL, BLADE, EXTENDED

## (undated) DEVICE — URETERAL KIT, IRIS, BLINKING STENT

## (undated) DEVICE — SUTURE, VICRYL, 0, 27 IN, UR-6, VIOLET

## (undated) DEVICE — SOLUTION, SCRUB EXIDINE, 4% CHG, 8 OZ

## (undated) DEVICE — TOWEL, OR, XRAY DETECT 5 PK, WHITE, 17X26, W/DMT TAG, ST

## (undated) DEVICE — HANDLE, PH, FOR YANKAUER SUCTION DEVICE

## (undated) DEVICE — SUTURE, PDS II, 1, 36 IN, CT, VIOLET

## (undated) DEVICE — SPONGE, LAP, XRAY DECT, 18IN X 18IN, W/MASTER DMT, STERILE

## (undated) DEVICE — TAPE, PLASTIC, POROUS, TRANSPORE, 2 IN X 10 YD, LF, TRANSPARENT